# Patient Record
Sex: MALE | Race: BLACK OR AFRICAN AMERICAN | NOT HISPANIC OR LATINO | Employment: OTHER | URBAN - METROPOLITAN AREA
[De-identification: names, ages, dates, MRNs, and addresses within clinical notes are randomized per-mention and may not be internally consistent; named-entity substitution may affect disease eponyms.]

---

## 2019-08-23 ENCOUNTER — TELEPHONE (OUTPATIENT)
Dept: NEPHROLOGY | Facility: CLINIC | Age: 54
End: 2019-08-23

## 2019-08-23 DIAGNOSIS — N18.9 CHRONIC KIDNEY DISEASE, UNSPECIFIED CKD STAGE: Primary | ICD-10-CM

## 2019-08-23 NOTE — TELEPHONE ENCOUNTER
----- Message from Tricia Rice sent at 8/23/2019  2:48 PM CDT -----  Contact: Pt  Pt missed a call and would like the nurse to return their call.    Pt can be reached at [phone]

## 2019-08-27 ENCOUNTER — TELEPHONE (OUTPATIENT)
Dept: NEPHROLOGY | Facility: CLINIC | Age: 54
End: 2019-08-27

## 2019-09-05 ENCOUNTER — TELEPHONE (OUTPATIENT)
Dept: NEPHROLOGY | Facility: CLINIC | Age: 54
End: 2019-09-05

## 2019-09-05 NOTE — TELEPHONE ENCOUNTER
----- Message from Chanel Mattson sent at 9/5/2019  9:16 AM CDT -----  Contact: pt  Pt called about appts     Didn't know doctor appt had been rescheduled   Relies on transportation Service    9/9  Has ultrasound     Symbol says insurance denied.  Do you know why?      I told him to call his insurance company to see why denied.    Can you call pt  Should he keep appt if he cannot have ultrasound done?    Thanks

## 2019-09-16 ENCOUNTER — TELEPHONE (OUTPATIENT)
Dept: NEPHROLOGY | Facility: CLINIC | Age: 54
End: 2019-09-16

## 2019-09-16 NOTE — TELEPHONE ENCOUNTER
----- Message from Elzbieta Shaikh sent at 9/16/2019 12:17 PM CDT -----  Contact:   Abi Mendez  [phone]  [facility] /    [facility] called stating that the pt does not need a referral to see the DrVinicio.   Calling to provide an reference number [id number]..   [phone] option 2.. Call  Back to verify

## 2019-09-17 ENCOUNTER — TELEPHONE (OUTPATIENT)
Dept: NEPHROLOGY | Facility: CLINIC | Age: 54
End: 2019-09-17

## 2019-09-30 ENCOUNTER — OFFICE VISIT (OUTPATIENT)
Dept: NEPHROLOGY | Facility: CLINIC | Age: 54
End: 2019-09-30
Payer: MEDICARE

## 2019-09-30 VITALS
WEIGHT: 177 LBS | SYSTOLIC BLOOD PRESSURE: 118 MMHG | HEIGHT: 71 IN | BODY MASS INDEX: 24.78 KG/M2 | DIASTOLIC BLOOD PRESSURE: 76 MMHG

## 2019-09-30 DIAGNOSIS — N17.9 ACUTE RENAL FAILURE, UNSPECIFIED ACUTE RENAL FAILURE TYPE: Primary | ICD-10-CM

## 2019-09-30 PROCEDURE — 3008F PR BODY MASS INDEX (BMI) DOCUMENTED: ICD-10-PCS | Mod: CPTII,S$GLB,, | Performed by: INTERNAL MEDICINE

## 2019-09-30 PROCEDURE — 99999 PR PBB SHADOW E&M-EST. PATIENT-LVL III: CPT | Mod: PBBFAC,,, | Performed by: INTERNAL MEDICINE

## 2019-09-30 PROCEDURE — 99204 OFFICE O/P NEW MOD 45 MIN: CPT | Mod: S$GLB,,, | Performed by: INTERNAL MEDICINE

## 2019-09-30 PROCEDURE — 99204 PR OFFICE/OUTPT VISIT, NEW, LEVL IV, 45-59 MIN: ICD-10-PCS | Mod: S$GLB,,, | Performed by: INTERNAL MEDICINE

## 2019-09-30 PROCEDURE — 99999 PR PBB SHADOW E&M-EST. PATIENT-LVL III: ICD-10-PCS | Mod: PBBFAC,,, | Performed by: INTERNAL MEDICINE

## 2019-09-30 PROCEDURE — 3008F BODY MASS INDEX DOCD: CPT | Mod: CPTII,S$GLB,, | Performed by: INTERNAL MEDICINE

## 2019-09-30 RX ORDER — PENICILLIN V POTASSIUM 500 MG/1
TABLET, FILM COATED ORAL
Refills: 0 | COMMUNITY
Start: 2019-08-31

## 2019-09-30 RX ORDER — BLOOD-GLUCOSE METER
EACH MISCELLANEOUS
Refills: 10 | COMMUNITY
Start: 2019-09-24

## 2019-09-30 RX ORDER — METFORMIN HYDROCHLORIDE 500 MG/1
TABLET ORAL
COMMUNITY

## 2019-09-30 RX ORDER — DICLOFENAC SODIUM 10 MG/G
GEL TOPICAL
Refills: 0 | COMMUNITY
Start: 2019-09-24

## 2019-09-30 RX ORDER — DOLUTEGRAVIR SODIUM 50 MG/1
TABLET, FILM COATED ORAL
Refills: 5 | COMMUNITY
Start: 2019-09-24

## 2019-09-30 RX ORDER — GABAPENTIN 300 MG/1
300 CAPSULE ORAL
COMMUNITY
Start: 2019-08-22

## 2019-09-30 RX ORDER — FLUTICASONE PROPIONATE 50 MCG
SPRAY, SUSPENSION (ML) NASAL
Refills: 11 | COMMUNITY
Start: 2019-09-24

## 2019-09-30 RX ORDER — FLUTICASONE PROPIONATE 50 MCG
1 SPRAY, SUSPENSION (ML) NASAL
COMMUNITY
Start: 2019-08-22 | End: 2020-08-21

## 2019-09-30 RX ORDER — BUPRENORPHINE HYDROCHLORIDE AND NALOXONE HYDROCHLORIDE DIHYDRATE 8; 2 MG/1; MG/1
TABLET SUBLINGUAL
COMMUNITY

## 2019-09-30 RX ORDER — BUPROPION HYDROCHLORIDE 150 MG/1
TABLET, EXTENDED RELEASE ORAL
Refills: 0 | COMMUNITY
Start: 2019-09-24

## 2019-09-30 RX ORDER — METFORMIN HYDROCHLORIDE 500 MG/1
TABLET ORAL
Refills: 0 | COMMUNITY
Start: 2019-07-23

## 2019-09-30 RX ORDER — TRAMADOL HYDROCHLORIDE 50 MG/1
TABLET ORAL
COMMUNITY

## 2019-09-30 RX ORDER — IBUPROFEN 800 MG/1
TABLET ORAL
Refills: 6 | COMMUNITY
Start: 2019-09-24

## 2019-09-30 RX ORDER — SILDENAFIL CITRATE 100 MG/1
TABLET, FILM COATED ORAL
Refills: 2 | COMMUNITY
Start: 2019-09-12

## 2019-09-30 RX ORDER — SILDENAFIL 100 MG/1
100 TABLET, FILM COATED ORAL
COMMUNITY
Start: 2019-09-10 | End: 2019-10-10

## 2019-09-30 RX ORDER — GABAPENTIN 300 MG/1
CAPSULE ORAL
Refills: 5 | COMMUNITY
Start: 2019-09-24

## 2019-09-30 NOTE — PROGRESS NOTES
REASON FOR CONSULT: SURJIT    REFERRING PHYSICIAN: local PCP    HISTORY OF PRESENT ILLNESS: 54 y.o. male who is new to me with HIV well controlled, DMII since 2011 and HTN, patient was referred here for abnormal renal function that was checked in June 2019 with Cr of 1.9, following cr in July and August were 1.3 and 1.2 respectively, patient reported taking Ibuprofen 3-4 tabs a day in June for headache now he is not taking much, only prn of half tab, he denies urinary or cardiac symptoms, no contrast exposure. He follows up with local PCP and HIV docs at [facility].      ROS:  General: negative for chills, or fatigue  ENT: No epistaxis or headaches  Hematological and Lymphatic: No bleeding problems or blood clots.  Endocrine: No skin changes or temperature intolerance  Respiratory: No cough, shortness of breath, or wheezing  Cardiovascular: No chest pain or dyspnea   Gastrointestinal: No abdominal pain, change in bowel habits  Genito-Urinary: No dysuria, trouble voiding, or hematuria  Musculoskeletal: ROS: negative for - joint pain, joint stiffness, joint swelling, muscle pain or muscular weakness  Neurological: No focal weakness, no numbness  Dermatological: No rash or ulcers.    PAST MEDICAL HISTORY:  No past medical history on file.    PAST SURGICAL HISTORY:  No past surgical history on file.    FAMILY HISTORY:   No renal diseases.    SOCIAL HISTORY:    Smokes daily, down to less half pack.    MEDICATIONS:    Current Outpatient Medications:     blood sugar diagnostic Strp, 1 strip by Other route., Disp: , Rfl:     dolutegravir (TIVICAY) 50 mg Tab, Tivicay 50 mg tablet  Take 1 tablet every day by oral route., Disp: , Rfl:     emtricitabine-tenofovir alafen (DESCOVY) 200-25 mg Tab, Descovy 200 mg-25 mg tablet  Take 1 tablet every day by oral route., Disp: , Rfl:     fluticasone propionate (FLONASE) 50 mcg/actuation nasal spray, 1 spray by Nasal route., Disp: , Rfl:     gabapentin (NEURONTIN) 300 MG capsule, Take 300  mg by mouth., Disp: , Rfl:     sildenafil (VIAGRA) 100 MG tablet, Take 100 mg by mouth., Disp: , Rfl:     buprenorphine-naloxone 8-2 mg (SUBOXONE) 8-2 mg Subl, buprenorphine 8 mg-naloxone 2 mg sublingual tablet  Place 1 tablet every day by sublingual route., Disp: , Rfl:     buPROPion (WELLBUTRIN SR) 150 MG TBSR 12 hr tablet, , Disp: , Rfl: 0    diclofenac sodium (VOLTAREN) 1 % Gel, , Disp: , Rfl: 0    fluticasone propionate (FLONASE) 50 mcg/actuation nasal spray, , Disp: , Rfl: 11    gabapentin (NEURONTIN) 300 MG capsule, , Disp: , Rfl: 5    ibuprofen (ADVIL,MOTRIN) 800 MG tablet, , Disp: , Rfl: 6    metFORMIN (GLUCOPHAGE) 500 MG tablet, metformin 500 mg tablet  Take 1 tablet twice a day by oral route., Disp: , Rfl:     metFORMIN (GLUCOPHAGE) 500 MG tablet, TK 1 T PO  BID WITH MEALS, Disp: , Rfl: 0    ONETOUCH VERIO Strp, , Disp: , Rfl: 10    penicillin v potassium (VEETID) 500 MG tablet, TK 1 T PO  BID TAT, Disp: , Rfl: 0    TIVICAY 50 mg Tab, , Disp: , Rfl: 5    traMADol (ULTRAM) 50 mg tablet, tramadol 50 mg tablet  Take 1 tablet every 6 hours by oral route., Disp: , Rfl:     VIAGRA 100 mg tablet, TK 1 T PO  PRF ED, Disp: , Rfl: 2   Medication List with Changes/Refills   Current Medications    BLOOD SUGAR DIAGNOSTIC STRP    1 strip by Other route.    BUPRENORPHINE-NALOXONE 8-2 MG (SUBOXONE) 8-2 MG SUBL    buprenorphine 8 mg-naloxone 2 mg sublingual tablet   Place 1 tablet every day by sublingual route.    BUPROPION (WELLBUTRIN SR) 150 MG TBSR 12 HR TABLET        DICLOFENAC SODIUM (VOLTAREN) 1 % GEL        DOLUTEGRAVIR (TIVICAY) 50 MG TAB    Tivicay 50 mg tablet   Take 1 tablet every day by oral route.    EMTRICITABINE-TENOFOVIR ALAFEN (DESCOVY) 200-25 MG TAB    Descovy 200 mg-25 mg tablet   Take 1 tablet every day by oral route.    FLUTICASONE PROPIONATE (FLONASE) 50 MCG/ACTUATION NASAL SPRAY    1 spray by Nasal route.    FLUTICASONE PROPIONATE (FLONASE) 50 MCG/ACTUATION NASAL SPRAY         "GABAPENTIN (NEURONTIN) 300 MG CAPSULE    Take 300 mg by mouth.    GABAPENTIN (NEURONTIN) 300 MG CAPSULE        IBUPROFEN (ADVIL,MOTRIN) 800 MG TABLET        METFORMIN (GLUCOPHAGE) 500 MG TABLET    metformin 500 mg tablet   Take 1 tablet twice a day by oral route.    METFORMIN (GLUCOPHAGE) 500 MG TABLET    TK 1 T PO  BID WITH MEALS    ONETOUCH VERIO STRP        PENICILLIN V POTASSIUM (VEETID) 500 MG TABLET    TK 1 T PO  BID TAT    SILDENAFIL (VIAGRA) 100 MG TABLET    Take 100 mg by mouth.    TIVICAY 50 MG TAB        TRAMADOL (ULTRAM) 50 MG TABLET    tramadol 50 mg tablet   Take 1 tablet every 6 hours by oral route.    VIAGRA 100 MG TABLET    TK 1 T PO  PRF ED        PHYSICAL EXAM:  /76   Ht 5' 11" (1.803 m)   Wt 80.3 kg (177 lb 0.5 oz)   BMI 24.69 kg/m²     General: No distress, No fever or chills  Head: Normocephalic,atraumatic  Eyes: conjunctivae/corneas clear. PERRL, EOM's intact.  Nose: Nares normal. Mucosa normal. No drainage or sinus tenderness.  Neck: No adenopathy,no carotid bruit,no JVD  Lungs:Clear to auscultation bilaterally, No Crackles  Heart: Regular rate and rhythm, no murmur, gallops or rubs  Abdomen: Soft, no tenderness, bowel sounds normal  Extremities: Atraumatic, no edema in LE  Skin: Turgor normal. No rashes or ulcers  Neurologic: No focal weakness, oriented.  Dialysis Access: Non applicable         LABS:  Cr latest in 8/2019 1.2 with eGFR >60      ASSESSMENT:    SURJIT in June 2019 due to extensive NSAIDs intake, highest cr ~ 1.9  -Cr baseline ~ 1.1-1.2 which is back to his normal in July and August this year  -UPCR na but none on UA      HTN  -well controlled, off/on on HCTZ    DMII  -well controlled on Metformin    HIV  -undetectable on Decovey            PLAN:  -advise PCP to follow on urine studies, if Microalbuminuria occures might switch to ACE-I or ARB, keep good control of DMII, HTN and HIV  -Continue current BP meds regimen  -Avoid NSAIDs intake      RTC as needed      Thanks for " allowing me to participate in the care of this patient.     8:41 AM    BANDAR JUSTICE MD  NEPHROLOGY ATTENDING

## 2019-09-30 NOTE — LETTER
September 30, 2019      No Recipients           [facility]  [address]  [facility] [address]  Phone: [phone]  Fax: [phone]          Patient: Ang Loya   MR Number: [id number]   YOB: 1965   Date of Visit: 9/30/2019       Dear :    Thank you for referring Ang Loya to me for evaluation. Attached you will find relevant portions of my assessment and plan of care.    If you have questions, please do not hesitate to call me. I look forward to following Ang Loya along with you.    Sincerely,    Jailyn Clayton MD    Enclosure  CC:  No Recipients    If you would like to receive this communication electronically, please contact [email or web address] or [phone] to request more information on [employer] Link access.    For providers and/or their staff who would like to refer a patient to [facility], please contact us through our one-stop-shop provider referral line, [facility] [place], at [phone].    If you feel you have received this communication in error or would no longer like to receive these types of communications, please e-mail [email or web address]

## 2019-11-16 ENCOUNTER — HOSPITAL ENCOUNTER (EMERGENCY)
Facility: HOSPITAL | Age: 54
Discharge: HOME | End: 2019-11-16
Attending: EMERGENCY MEDICINE
Payer: MEDICARE

## 2019-11-16 VITALS
HEART RATE: 53 BPM | RESPIRATION RATE: 18 BRPM | HEIGHT: 71 IN | OXYGEN SATURATION: 100 % | TEMPERATURE: 98 F | DIASTOLIC BLOOD PRESSURE: 90 MMHG | BODY MASS INDEX: 23.8 KG/M2 | SYSTOLIC BLOOD PRESSURE: 138 MMHG | WEIGHT: 170 LBS

## 2019-11-16 DIAGNOSIS — K92.0 HEMATEMESIS WITH NAUSEA: ICD-10-CM

## 2019-11-16 DIAGNOSIS — R10.13 ABDOMINAL PAIN, ACUTE, EPIGASTRIC: Primary | ICD-10-CM

## 2019-11-16 LAB
ABO + RH BLD: NORMAL
ALBUMIN SERPL BCP-MCNC: 4.1 G/DL (ref 3.5–5.2)
ALP SERPL-CCNC: 88 U/L (ref 55–135)
ALT SERPL W/O P-5'-P-CCNC: 57 U/L (ref 10–44)
ANION GAP SERPL CALC-SCNC: 9 MMOL/L (ref 8–16)
APTT BLDCRRT: 27.1 SEC (ref 21–32)
AST SERPL-CCNC: 46 U/L (ref 10–40)
BASOPHILS # BLD AUTO: 0.05 K/UL (ref 0–0.2)
BASOPHILS NFR BLD: 0.7 % (ref 0–1.9)
BILIRUB SERPL-MCNC: 0.7 MG/DL (ref 0.1–1)
BLD GP AB SCN CELLS X3 SERPL QL: NORMAL
BUN SERPL-MCNC: 20 MG/DL (ref 6–20)
CALCIUM SERPL-MCNC: 10 MG/DL (ref 8.7–10.5)
CHLORIDE SERPL-SCNC: 103 MMOL/L (ref 95–110)
CO2 SERPL-SCNC: 26 MMOL/L (ref 23–29)
CREAT SERPL-MCNC: 1.1 MG/DL (ref 0.5–1.4)
DIFFERENTIAL METHOD: ABNORMAL
EOSINOPHIL # BLD AUTO: 0.1 K/UL (ref 0–0.5)
EOSINOPHIL NFR BLD: 1.9 % (ref 0–8)
ERYTHROCYTE [DISTWIDTH] IN BLOOD BY AUTOMATED COUNT: 12.3 % (ref 11.5–14.5)
EST. GFR  (AFRICAN AMERICAN): >60 ML/MIN/1.73 M^2
EST. GFR  (NON AFRICAN AMERICAN): >60 ML/MIN/1.73 M^2
GLUCOSE SERPL-MCNC: 79 MG/DL (ref 70–110)
HCT VFR BLD AUTO: 43.6 % (ref 40–54)
HGB BLD-MCNC: 14.7 G/DL (ref 14–18)
IMM GRANULOCYTES # BLD AUTO: 0.01 K/UL (ref 0–0.04)
IMM GRANULOCYTES NFR BLD AUTO: 0.1 % (ref 0–0.5)
INR PPP: 1.1 (ref 0.8–1.2)
LIPASE SERPL-CCNC: 68 U/L (ref 4–60)
LYMPHOCYTES # BLD AUTO: 1.3 K/UL (ref 1–4.8)
LYMPHOCYTES NFR BLD: 18.2 % (ref 18–48)
MCH RBC QN AUTO: 31.2 PG (ref 27–31)
MCHC RBC AUTO-ENTMCNC: 33.7 G/DL (ref 32–36)
MCV RBC AUTO: 93 FL (ref 82–98)
MONOCYTES # BLD AUTO: 0.8 K/UL (ref 0.3–1)
MONOCYTES NFR BLD: 11.8 % (ref 4–15)
NEUTROPHILS # BLD AUTO: 4.7 K/UL (ref 1.8–7.7)
NEUTROPHILS NFR BLD: 67.3 % (ref 38–73)
NRBC BLD-RTO: 0 /100 WBC
PLATELET # BLD AUTO: 213 K/UL (ref 150–350)
PMV BLD AUTO: 9.6 FL (ref 9.2–12.9)
POTASSIUM SERPL-SCNC: 4.1 MMOL/L (ref 3.5–5.1)
PROT SERPL-MCNC: 8.2 G/DL (ref 6–8.4)
PROTHROMBIN TIME: 11.3 SEC (ref 9–12.5)
RBC # BLD AUTO: 4.71 M/UL (ref 4.6–6.2)
SODIUM SERPL-SCNC: 138 MMOL/L (ref 136–145)
WBC # BLD AUTO: 7.02 K/UL (ref 3.9–12.7)

## 2019-11-16 PROCEDURE — 96361 HYDRATE IV INFUSION ADD-ON: CPT

## 2019-11-16 PROCEDURE — 85610 PROTHROMBIN TIME: CPT

## 2019-11-16 PROCEDURE — 86850 RBC ANTIBODY SCREEN: CPT

## 2019-11-16 PROCEDURE — 83690 ASSAY OF LIPASE: CPT

## 2019-11-16 PROCEDURE — C9113 INJ PANTOPRAZOLE SODIUM, VIA: HCPCS | Performed by: EMERGENCY MEDICINE

## 2019-11-16 PROCEDURE — 85730 THROMBOPLASTIN TIME PARTIAL: CPT

## 2019-11-16 PROCEDURE — 80053 COMPREHEN METABOLIC PANEL: CPT

## 2019-11-16 PROCEDURE — 85025 COMPLETE CBC W/AUTO DIFF WBC: CPT

## 2019-11-16 PROCEDURE — 99284 EMERGENCY DEPT VISIT MOD MDM: CPT | Mod: 25

## 2019-11-16 PROCEDURE — 96374 THER/PROPH/DIAG INJ IV PUSH: CPT

## 2019-11-16 PROCEDURE — 63600175 PHARM REV CODE 636 W HCPCS: Performed by: EMERGENCY MEDICINE

## 2019-11-16 RX ORDER — PANTOPRAZOLE SODIUM 40 MG/10ML
80 INJECTION, POWDER, LYOPHILIZED, FOR SOLUTION INTRAVENOUS
Status: COMPLETED | OUTPATIENT
Start: 2019-11-16 | End: 2019-11-16

## 2019-11-16 RX ORDER — PANTOPRAZOLE SODIUM 40 MG/1
TABLET, DELAYED RELEASE ORAL
Qty: 30 TABLET | Refills: 2 | Status: SHIPPED | OUTPATIENT
Start: 2019-11-16

## 2019-11-16 RX ORDER — ONDANSETRON 4 MG/1
4 TABLET, FILM COATED ORAL EVERY 8 HOURS PRN
Qty: 12 TABLET | Refills: 0 | Status: SHIPPED | OUTPATIENT
Start: 2019-11-16

## 2019-11-16 RX ADMIN — SODIUM CHLORIDE 1000 ML: 0.9 INJECTION, SOLUTION INTRAVENOUS at 04:11

## 2019-11-16 RX ADMIN — PANTOPRAZOLE SODIUM 80 MG: 40 INJECTION, POWDER, FOR SOLUTION INTRAVENOUS at 04:11

## 2019-11-16 NOTE — ED NOTES
Patient expressed to me that he doesn't like his living situation and he does not like people and the people he lives with, he is currently living in a group home. Attempted to contact the  and left a voicemail. Will continue to try.   Patient also expressed he was feeling homicidal, MD aware. MD stated he will go talk to the patient.

## 2019-11-16 NOTE — ED TRIAGE NOTES
54 y.o. Presents to the ED with chief complaint of abdominal pain and hematemesis. Pt states he threw up bright red blood twice today and abdominal pain in the epigastric pain. Pt states he also had diarrhea today. Pt denies black stools. Pt rates pain 10/10. AAOx4, NAD. Pt states he is HIV+ and he stopped taking all his medications bedside his HIV medications.

## 2019-11-16 NOTE — ED NOTES
Patient sleeping comfortably with light off, call light within reach, side rails up x 2, bed in lowest position, will continue to monitor.

## 2019-11-16 NOTE — ED PROVIDER NOTES
Encounter Date: 11/16/2019    SCRIBE #1 NOTE: I, Chanel Tidwell, am scribing for, and in the presence of,  Stewart Martínez MD. I have scribed the following portions of the note - Other sections scribed: HPI, ROS.       History     Chief Complaint   Patient presents with    Abdominal Pain     Pt reports abd pain that is chronic, but worsening over 2 months with hematemesis begining today    Hematemesis     CC: Hematemesis    HPI:   This is a 54 y.o. male with a PMHx of HTN, DM, Peripheral Neuropathy, and Arthritis who presents to the Emergency Department complaining of hematemesis (x2) that began this morning. Pt reports associated symptoms of epigastric abdominal pain that began this morning, nausea that has been going on for years, right frontal headache, diarrhea (x1 PTA), back pain, and a chronic rash on his back. He denies fever, chills, or dysuria. Abdominal pain worsens with palpation. Pt takes Percocet to treat his back pain. Pt is compliant with HTN and DM medications. Pt smokes. Pt drinks occasionally. Pt has no PSHx. NKDA.       The history is provided by the patient.     Review of patient's allergies indicates:  No Known Allergies  Past Medical History:   Diagnosis Date    Diabetes mellitus     HIV (human immunodeficiency virus infection) 1998    Hypertension      History reviewed. No pertinent surgical history.  History reviewed. No pertinent family history.  Social History     Tobacco Use    Smoking status: Current Some Day Smoker    Smokeless tobacco: Never Used   Substance Use Topics    Alcohol use: Yes     Comment: OCC    Drug use: Not Currently     Review of Systems   Constitutional: Negative for chills, diaphoresis and fever.   HENT: Negative for ear pain and sore throat.    Eyes: Negative for visual disturbance.   Respiratory: Negative for cough and shortness of breath.    Cardiovascular: Negative for chest pain.   Gastrointestinal: Positive for abdominal pain (epigastric), diarrhea, nausea and  vomiting (hematemesis).   Genitourinary: Negative for dysuria.   Musculoskeletal: Positive for back pain. Negative for arthralgias and myalgias.   Skin: Positive for rash (chronic rash on back).   Neurological: Positive for headaches (right frontal).       Physical Exam     Initial Vitals [11/16/19 1524]   BP Pulse Resp Temp SpO2   (!) 159/71 67 18 98 °F (36.7 °C) 95 %      MAP       --         Physical Exam  The patient was examined specifically for the following:   General:No significant distress, Good color, Warm and dry. Head and neck:Scalp atraumatic, Neck supple. Neurological:Appropriate conversation, Gross motor deficits. Eyes:Conjugate gaze, Clear corneas. ENT: No epistaxis. Cardiac: Regular rate and rhythm, Grossly normal heart tones. Pulmonary: Wheezing, Rales. Gastrointestinal: Abdominal tenderness, Abdominal distention. Musculoskeletal: Extremity deformity, Apparent pain with range of motion of the joints. Skin: Rash.   The findings on examination were normal except for the following:  The patient has guaiac-negative stool.  He has mild epigastric abdominal tenderness.  ED Course   Procedures   This patient would not tolerate NG tube placement.  I attempted to place an orogastric tube.  The patient did not tolerate that well either.  I am not sure that the tip of the tube was actually placed into the stomach.  We tested the pH of the aspirate.  It was about 5.  Limited irrigation with tap water did not reveal any blood.   Labs Reviewed   COMPREHENSIVE METABOLIC PANEL - Abnormal; Notable for the following components:       Result Value    AST 46 (*)     ALT 57 (*)     All other components within normal limits   CBC W/ AUTO DIFFERENTIAL - Abnormal; Notable for the following components:    Mean Corpuscular Hemoglobin 31.2 (*)     All other components within normal limits   LIPASE - Abnormal; Notable for the following components:    Lipase 68 (*)     All other components within normal limits   APTT    PROTIME-INR   TYPE & SCREEN          Imaging Results    None       Medical decision making:  Given the above this patient presents with a history 2 episodes of hematemesis.  He also has some epigastric abdominal discomfort.  His stools guaiac-negative.  A questionable nasogastric intubation was performed.  No blood was obtained. The patient has a normal hemoglobin and hematocrit.  I doubt significant upper GI bleeding.  I will discharge this patient outpatient evaluation and treatment.  I will treat the patient with pantoprazole and Zofran.  I will have him follow up with Gastroenterology in the office.  I discussed this case with , who recommends discharge to office evaluation and treatment.  I am concerned this patient may have an early peptic ulcer.                Scribe Attestation:   Scribe #1: I performed the above scribed service and the documentation accurately describes the services I performed. I attest to the accuracy of the note.                          Clinical Impression:       ICD-10-CM ICD-9-CM   1. Abdominal pain, acute, epigastric R10.13 789.06     338.19   2. Hematemesis with nausea K92.0 578.0     787.02              I personally performed the services described in this documentation.  All medical record  entries made by the scribe are at my direction and in my presence.  Signed, Dr. Tanya Martínez MD  11/16/19 7595

## 2019-11-16 NOTE — DISCHARGE INSTRUCTIONS
Please follow-up with Gastroenterology this week.  Call Monday morning for an appointment.  Return immediately if you get worse or if new problems develop, especially more vomiting blood, or black or bloody stools.  Pantoprazole and Zofran as directed.  Please use Mylanta 30 mL by mouth every 4 hr while you have abdominal discomfort.  Please avoid caffeine carbonation alcohol cigarette citrus tomato Naprosyn aspirin ibuprofen.

## 2019-11-16 NOTE — ED NOTES
I attempted twice to place NG without success, patient would not allow me to finish the procedure. OG placed by MD to lavage for blood, pH tested done with result of 4, MD lavaged but there was no blood present and OG tube removed.

## 2019-11-17 NOTE — PROVIDER PROGRESS NOTES - EMERGENCY DEPT.
"Encounter Date: 11/16/2019    ED Physician Progress Notes        Physician Note:   Notified by nursing that patient had made some potential suicidal and or homicidal remarks when specifically asked during nursing assessment. I discussed this with the patient. States he is currently at a group home that he is not happy with. That he has a  whom he know how to contact on Monday. Stated to me that he is not suicidal or homicidal at this time. Feels he will be fine emotionally until contacting his  on Monday. I see that Dr. Martínez put in a social work consult at 5:27 PM today. The nurse has made two phone calls to the  phone number without answer. Per [facility] there is no on call  at this time. Patient already discharged form a medical stand point by Dr. Martínez. I asked Mr. Loya if there was anything else he feels I can do for him in the ER [facility] and responded "no."  Advised nurse she can proceed with the previous discharge.      "

## 2020-01-23 ENCOUNTER — HOSPITAL ENCOUNTER (EMERGENCY)
Facility: HOSPITAL | Age: 55
Discharge: HOME | End: 2020-01-23
Attending: EMERGENCY MEDICINE
Payer: MEDICARE

## 2020-01-23 VITALS
HEART RATE: 83 BPM | SYSTOLIC BLOOD PRESSURE: 152 MMHG | RESPIRATION RATE: 19 BRPM | TEMPERATURE: 99 F | OXYGEN SATURATION: 97 % | DIASTOLIC BLOOD PRESSURE: 80 MMHG

## 2020-01-23 DIAGNOSIS — R06.02 SOB (SHORTNESS OF BREATH): ICD-10-CM

## 2020-01-23 DIAGNOSIS — J10.1 INFLUENZA A: Primary | ICD-10-CM

## 2020-01-23 LAB
ALBUMIN SERPL BCP-MCNC: 3.8 G/DL (ref 3.5–5.2)
ALP SERPL-CCNC: 66 U/L (ref 55–135)
ALT SERPL W/O P-5'-P-CCNC: 17 U/L (ref 10–44)
ANION GAP SERPL CALC-SCNC: 12 MMOL/L (ref 8–16)
AST SERPL-CCNC: 34 U/L (ref 10–40)
BACTERIA #/AREA URNS AUTO: NORMAL /HPF
BASOPHILS # BLD AUTO: 0.02 K/UL (ref 0–0.2)
BASOPHILS NFR BLD: 0.2 % (ref 0–1.9)
BILIRUB SERPL-MCNC: 0.6 MG/DL (ref 0.1–1)
BILIRUB UR QL STRIP: NEGATIVE
BUN SERPL-MCNC: 26 MG/DL (ref 6–20)
CALCIUM SERPL-MCNC: 9.8 MG/DL (ref 8.7–10.5)
CHLORIDE SERPL-SCNC: 101 MMOL/L (ref 95–110)
CLARITY UR REFRACT.AUTO: CLEAR
CO2 SERPL-SCNC: 23 MMOL/L (ref 23–29)
COLOR UR AUTO: YELLOW
CREAT SERPL-MCNC: 1.2 MG/DL (ref 0.5–1.4)
DIFFERENTIAL METHOD: ABNORMAL
EOSINOPHIL # BLD AUTO: 0 K/UL (ref 0–0.5)
EOSINOPHIL NFR BLD: 0 % (ref 0–8)
ERYTHROCYTE [DISTWIDTH] IN BLOOD BY AUTOMATED COUNT: 13.2 % (ref 11.5–14.5)
EST. GFR  (AFRICAN AMERICAN): >60 ML/MIN/1.73 M^2
EST. GFR  (NON AFRICAN AMERICAN): >60 ML/MIN/1.73 M^2
GLUCOSE SERPL-MCNC: 104 MG/DL (ref 70–110)
GLUCOSE UR QL STRIP: NEGATIVE
HCT VFR BLD AUTO: 40.7 % (ref 40–54)
HGB BLD-MCNC: 13.4 G/DL (ref 14–18)
HGB UR QL STRIP: ABNORMAL
HYALINE CASTS UR QL AUTO: 0 /LPF
IMM GRANULOCYTES # BLD AUTO: 0.03 K/UL (ref 0–0.04)
IMM GRANULOCYTES NFR BLD AUTO: 0.4 % (ref 0–0.5)
INFLUENZA A, MOLECULAR: POSITIVE
INFLUENZA B, MOLECULAR: NEGATIVE
KETONES UR QL STRIP: ABNORMAL
LACTATE SERPL-SCNC: 1.8 MMOL/L (ref 0.5–2.2)
LEUKOCYTE ESTERASE UR QL STRIP: NEGATIVE
LYMPHOCYTES # BLD AUTO: 0.5 K/UL (ref 1–4.8)
LYMPHOCYTES NFR BLD: 5.9 % (ref 18–48)
MCH RBC QN AUTO: 30.7 PG (ref 27–31)
MCHC RBC AUTO-ENTMCNC: 32.9 G/DL (ref 32–36)
MCV RBC AUTO: 93 FL (ref 82–98)
MICROSCOPIC COMMENT: NORMAL
MONOCYTES # BLD AUTO: 0.8 K/UL (ref 0.3–1)
MONOCYTES NFR BLD: 9.1 % (ref 4–15)
NEUTROPHILS # BLD AUTO: 6.9 K/UL (ref 1.8–7.7)
NEUTROPHILS NFR BLD: 84.4 % (ref 38–73)
NITRITE UR QL STRIP: NEGATIVE
NRBC BLD-RTO: 0 /100 WBC
PH UR STRIP: 5 [PH] (ref 5–8)
PLATELET # BLD AUTO: 196 K/UL (ref 150–350)
PMV BLD AUTO: 9.6 FL (ref 9.2–12.9)
POTASSIUM SERPL-SCNC: 3.6 MMOL/L (ref 3.5–5.1)
PROT SERPL-MCNC: 7.9 G/DL (ref 6–8.4)
PROT UR QL STRIP: ABNORMAL
RBC # BLD AUTO: 4.36 M/UL (ref 4.6–6.2)
RBC #/AREA URNS AUTO: 4 /HPF (ref 0–4)
SODIUM SERPL-SCNC: 136 MMOL/L (ref 136–145)
SP GR UR STRIP: 1.02 (ref 1–1.03)
SPECIMEN SOURCE: ABNORMAL
URN SPEC COLLECT METH UR: ABNORMAL
WBC # BLD AUTO: 8.2 K/UL (ref 3.9–12.7)
WBC #/AREA URNS AUTO: 0 /HPF (ref 0–5)

## 2020-01-23 PROCEDURE — 83605 ASSAY OF LACTIC ACID: CPT

## 2020-01-23 PROCEDURE — 25000003 PHARM REV CODE 250: Performed by: PHYSICIAN ASSISTANT

## 2020-01-23 PROCEDURE — 87502 INFLUENZA DNA AMP PROBE: CPT

## 2020-01-23 PROCEDURE — 99285 PR EMERGENCY DEPT VISIT,LEVEL V: ICD-10-PCS | Mod: ,,, | Performed by: PHYSICIAN ASSISTANT

## 2020-01-23 PROCEDURE — 96360 HYDRATION IV INFUSION INIT: CPT

## 2020-01-23 PROCEDURE — 63600175 PHARM REV CODE 636 W HCPCS: Performed by: PHYSICIAN ASSISTANT

## 2020-01-23 PROCEDURE — 85025 COMPLETE CBC W/AUTO DIFF WBC: CPT

## 2020-01-23 PROCEDURE — 99284 EMERGENCY DEPT VISIT MOD MDM: CPT | Mod: 25

## 2020-01-23 PROCEDURE — 99285 EMERGENCY DEPT VISIT HI MDM: CPT | Mod: ,,, | Performed by: PHYSICIAN ASSISTANT

## 2020-01-23 PROCEDURE — 80053 COMPREHEN METABOLIC PANEL: CPT

## 2020-01-23 PROCEDURE — 81001 URINALYSIS AUTO W/SCOPE: CPT

## 2020-01-23 RX ORDER — ACETAMINOPHEN 500 MG
1000 TABLET ORAL
Status: COMPLETED | OUTPATIENT
Start: 2020-01-23 | End: 2020-01-23

## 2020-01-23 RX ADMIN — ACETAMINOPHEN 1000 MG: 500 TABLET ORAL at 07:01

## 2020-01-23 RX ADMIN — SODIUM CHLORIDE, SODIUM LACTATE, POTASSIUM CHLORIDE, AND CALCIUM CHLORIDE 1000 ML: .6; .31; .03; .02 INJECTION, SOLUTION INTRAVENOUS at 07:01

## 2020-01-24 NOTE — DISCHARGE INSTRUCTIONS
Use Tylenol and/or Motrin to control body aches and fever  Drink plenty of fluids  Return to the ER as needed

## 2020-01-24 NOTE — ED PROVIDER NOTES
Encounter Date: 1/23/2020       History     Chief Complaint   Patient presents with    Fever     Pt arrives c/o 102.4 fever home. Hx of HIV.     54-year-old male with HIV presents to ER with a chief complaint of fever and myalgias for 2 days.  He has not taken any medication for his symptoms.  Upon arrival to the ER he has a low-grade fever, mildly tachypneic, normotensive and not tachycardic.  He complains of a cough, productive for few days. He denies any pain other than chronic pain. He is compliant with all of his antiviral medications and reports recent labs show undetectable viral load.  He is on prophylactic amoxicillin.  He appears ill but not septic.        Review of patient's allergies indicates:  No Known Allergies  Past Medical History:   Diagnosis Date    Diabetes mellitus     HIV (human immunodeficiency virus infection) 1998    Hypertension      No past surgical history on file.  No family history on file.  Social History     Tobacco Use    Smoking status: Current Some Day Smoker    Smokeless tobacco: Never Used   Substance Use Topics    Alcohol use: Yes     Comment: OCC    Drug use: Not Currently     Review of Systems   Constitutional: Positive for chills, fatigue and fever.   HENT: Negative for sore throat.    Respiratory: Positive for cough. Negative for shortness of breath.    Cardiovascular: Negative for chest pain.   Gastrointestinal: Negative for nausea.   Genitourinary: Negative for dysuria.   Musculoskeletal: Negative for back pain.   Skin: Negative for rash.   Neurological: Negative for weakness.   Hematological: Does not bruise/bleed easily.       Physical Exam     Initial Vitals [01/23/20 1900]   BP Pulse Resp Temp SpO2   (!) 145/83 75 (!) 22 (!) 102.5 °F (39.2 °C) 97 %      MAP       --         Physical Exam    Constitutional: Vital signs are normal. He appears well-developed and well-nourished. He is not diaphoretic. No distress.   HENT:   Head: Normocephalic and atraumatic.    Right Ear: Hearing and external ear normal.   Left Ear: Hearing and external ear normal.   Nose: Nose normal.   Mouth/Throat: Oropharyngeal exudate present.   Eyes: Conjunctivae are normal.   Cardiovascular: Normal rate and regular rhythm. Exam reveals no gallop and no friction rub.    No murmur heard.  Pulmonary/Chest: No respiratory distress. He has no wheezes. He has no rhonchi. He has no rales. He exhibits no tenderness.   Clear with good air movement   Abdominal: Soft. Normal appearance and bowel sounds are normal. He exhibits no distension. There is no tenderness.   Soft and nontender   Musculoskeletal: Normal range of motion.   Neurological: He is alert and oriented to person, place, and time.   Skin: Skin is warm and intact.   Psychiatric: He has a normal mood and affect. His speech is normal and behavior is normal. Cognition and memory are normal.         ED Course   Procedures  Labs Reviewed   INFLUENZA A & B BY MOLECULAR   CBC W/ AUTO DIFFERENTIAL   COMPREHENSIVE METABOLIC PANEL   URINALYSIS, REFLEX TO URINE CULTURE   LACTIC ACID, PLASMA          Imaging Results    None       X-Rays:   Independently Interpreted Readings:   Other Readings:  No acute cardiopulmonary process    Medical Decision Making:   Initial Assessment:   54-year-old male presenting with fever  Differential Diagnosis:   UTI, pneumonia, sinusitis, influenza  Independently Interpreted Test(s):   I have ordered and independently interpreted X-rays - see prior notes.  Clinical Tests:   Lab Tests: Ordered and Reviewed  Radiological Study: Ordered and Reviewed  Medical Tests: Ordered and Reviewed  ED Management:  No acute findings chest x-ray  Labs remarkable for influenza  Patient's symptoms improved after treatment in the ED with fluids and Tylenol  Patient still feels ill but better  He is tolerating p.o.  Discuss the options of treatment with the patient including Tamiflu  The decision was made to treat supportively with fluids rest  Tylenol and ibuprofen  Strict return and follow-up precautions given.  Patient is stable for discharge.  Other:   I discussed test(s) with the performing physician.                                 Clinical Impression:       ICD-10-CM ICD-9-CM   1. Influenza A J10.1 487.1   2. SOB (shortness of breath) R06.02 786.05         Disposition:   Disposition: Discharged  Condition: Stable                     Tab Dickerson PA-C  01/23/20 2017

## 2020-01-24 NOTE — ED TRIAGE NOTES
Pt. Presents to ED today with ems from home and c/o generalized body aches and sob over the last day. +productive cough with white phlegm, tachypneic at 40 breaths/min, sating 99% RA. Hx of HIV, febrile at 102.1 per ems. Denies cp, n/v/d. Currently a&ox4, gcs 15, calm, cooperative.

## 2020-01-24 NOTE — EKG INTERPRETATIONS - EMERGENCY DEPT.
Independently interpreted by MD:  Rate 102, NSR, no stemi, no ectopy, no hypertrophy, tachycardia, nonspecific ST and T wave changes

## 2020-01-24 NOTE — ED NOTES
Denies questions or concerns regarding discharge instructions or fu. No acute distress noted per this RN. Taken to lobby with family in wheelchair.

## 2022-03-23 ENCOUNTER — HOSPITAL ENCOUNTER (EMERGENCY)
Facility: OTHER | Age: 57
Discharge: HOME | End: 2022-03-23
Attending: EMERGENCY MEDICINE
Payer: MEDICARE

## 2022-03-23 VITALS
HEIGHT: 71 IN | OXYGEN SATURATION: 94 % | TEMPERATURE: 98 F | HEART RATE: 74 BPM | BODY MASS INDEX: 26.6 KG/M2 | WEIGHT: 190 LBS | SYSTOLIC BLOOD PRESSURE: 119 MMHG | RESPIRATION RATE: 16 BRPM | DIASTOLIC BLOOD PRESSURE: 73 MMHG

## 2022-03-23 DIAGNOSIS — Z51.89 VISIT FOR WOUND CHECK: Primary | ICD-10-CM

## 2022-03-23 DIAGNOSIS — S99.921A INJURY OF RIGHT GREAT TOE: ICD-10-CM

## 2022-03-23 LAB
ALBUMIN SERPL BCP-MCNC: 3.5 G/DL (ref 3.5–5.2)
ALP SERPL-CCNC: 60 U/L (ref 55–135)
ALT SERPL W/O P-5'-P-CCNC: 26 U/L (ref 10–44)
ANION GAP SERPL CALC-SCNC: 11 MMOL/L (ref 8–16)
AST SERPL-CCNC: 19 U/L (ref 10–40)
BASOPHILS # BLD AUTO: 0.03 K/UL (ref 0–0.2)
BASOPHILS NFR BLD: 0.9 % (ref 0–1.9)
BILIRUB SERPL-MCNC: 0.4 MG/DL (ref 0.1–1)
BUN SERPL-MCNC: 27 MG/DL (ref 6–20)
CALCIUM SERPL-MCNC: 9.5 MG/DL (ref 8.7–10.5)
CHLORIDE SERPL-SCNC: 105 MMOL/L (ref 95–110)
CO2 SERPL-SCNC: 23 MMOL/L (ref 23–29)
CREAT SERPL-MCNC: 1.2 MG/DL (ref 0.5–1.4)
CRP SERPL-MCNC: 2.3 MG/L (ref 0–8.2)
DIFFERENTIAL METHOD: ABNORMAL
EOSINOPHIL # BLD AUTO: 0.2 K/UL (ref 0–0.5)
EOSINOPHIL NFR BLD: 4.6 % (ref 0–8)
ERYTHROCYTE [DISTWIDTH] IN BLOOD BY AUTOMATED COUNT: 13.1 % (ref 11.5–14.5)
ERYTHROCYTE [SEDIMENTATION RATE] IN BLOOD: 32 MM/HR (ref 0–10)
EST. GFR  (AFRICAN AMERICAN): >60 ML/MIN/1.73 M^2
EST. GFR  (NON AFRICAN AMERICAN): >60 ML/MIN/1.73 M^2
GLUCOSE SERPL-MCNC: 105 MG/DL (ref 70–110)
HCT VFR BLD AUTO: 38.2 % (ref 40–54)
HGB BLD-MCNC: 12.8 G/DL (ref 14–18)
IMM GRANULOCYTES # BLD AUTO: 0 K/UL (ref 0–0.04)
IMM GRANULOCYTES NFR BLD AUTO: 0 % (ref 0–0.5)
LYMPHOCYTES # BLD AUTO: 1.7 K/UL (ref 1–4.8)
LYMPHOCYTES NFR BLD: 49 % (ref 18–48)
MCH RBC QN AUTO: 31.7 PG (ref 27–31)
MCHC RBC AUTO-ENTMCNC: 33.5 G/DL (ref 32–36)
MCV RBC AUTO: 95 FL (ref 82–98)
MONOCYTES # BLD AUTO: 0.4 K/UL (ref 0.3–1)
MONOCYTES NFR BLD: 10.4 % (ref 4–15)
NEUTROPHILS # BLD AUTO: 1.2 K/UL (ref 1.8–7.7)
NEUTROPHILS NFR BLD: 35.1 % (ref 38–73)
NRBC BLD-RTO: 0 /100 WBC
PLATELET # BLD AUTO: 197 K/UL (ref 150–450)
PMV BLD AUTO: 9.7 FL (ref 9.2–12.9)
POTASSIUM SERPL-SCNC: 4.3 MMOL/L (ref 3.5–5.1)
PROT SERPL-MCNC: 7.4 G/DL (ref 6–8.4)
RBC # BLD AUTO: 4.04 M/UL (ref 4.6–6.2)
SODIUM SERPL-SCNC: 139 MMOL/L (ref 136–145)
WBC # BLD AUTO: 3.45 K/UL (ref 3.9–12.7)

## 2022-03-23 PROCEDURE — 80053 COMPREHEN METABOLIC PANEL: CPT | Performed by: NURSE PRACTITIONER

## 2022-03-23 PROCEDURE — 85025 COMPLETE CBC W/AUTO DIFF WBC: CPT | Performed by: NURSE PRACTITIONER

## 2022-03-23 PROCEDURE — 86140 C-REACTIVE PROTEIN: CPT | Performed by: NURSE PRACTITIONER

## 2022-03-23 PROCEDURE — 85651 RBC SED RATE NONAUTOMATED: CPT | Performed by: NURSE PRACTITIONER

## 2022-03-23 PROCEDURE — 25000003 PHARM REV CODE 250: Performed by: NURSE PRACTITIONER

## 2022-03-23 PROCEDURE — 99284 EMERGENCY DEPT VISIT MOD MDM: CPT | Mod: 25

## 2022-03-23 RX ORDER — LISINOPRIL 5 MG/1
5 TABLET ORAL DAILY
COMMUNITY
Start: 2022-03-22

## 2022-03-23 RX ORDER — BACITRACIN ZINC 500 UNIT/G
OINTMENT (GRAM) TOPICAL 2 TIMES DAILY
Qty: 28 G | Refills: 0 | Status: SHIPPED | OUTPATIENT
Start: 2022-03-23 | End: 2022-04-02

## 2022-03-23 RX ADMIN — BACITRACIN, NEOMYCIN, POLYMYXIN B 1 EACH: 400; 3.5; 5 OINTMENT TOPICAL at 01:03

## 2022-03-23 NOTE — ED TRIAGE NOTES
Chief Complaint   Patient presents with    Foot Injury     Pt advised on march 3rd 2022 he was at the podiatrist office at [place] and they removed a calus on the lateral side of the first digit and now the location appears to be infected - pt advised his physician says it looks ok and now he wants to be evaluated here      Pt presents to ED with infection to R foot under big toe. Pt is diabetic and has neuropathy and went to podiatrist office at [place] where they shaved his callus down. Wound appears to be infected and has blue discoloring, March 3rd was his appointment. AAOX4 and in no acute distress.

## 2022-03-23 NOTE — DISCHARGE INSTRUCTIONS
We have reviewed the notes from your podiatrist in the ER.  You have no acute signs of infection.  Please continue to apply topical ointment twice a day to promote healing.  Follow-up with your podiatrist as scheduled.    Our goal in the emergency department is to always give you outstanding care and exceptional service. You may receive a survey by mail or e-mail in the next week regarding your experience in our ED. We would greatly appreciate your completing and returning the survey. Your feedback provides us with a way to recognize our staff who give very good care and it helps us learn how to improve when your experience was below our aspiration of excellence.

## 2022-03-23 NOTE — ED PROVIDER NOTES
Encounter Date: 3/23/2022       History     Chief Complaint   Patient presents with    Foot Injury     Pt advised on march 3rd 2022 he was at the podiatrist office at [facility] and they removed a calus on the lateral side of the first digit and now the location appears to be infected - pt advised his physician says it looks ok and now he wants to be evaluated here      Patient is a 56-year-old male with medical history of HIV, diabetes, hypertension presenting to the ED for right great toe foot wound.  Patient states he was seen by podiatry on 03/03 and had a bunion removed.  Patient states he has was cut at that time and is concerned that it is not healing appropriately.  Patient was seen again on 03/07 and advised healing was appropriate due to silver nitrate usage.  Patient states he was advised to apply ointment daily but has not been able to for the past 4 days due to losing his housing.  Patient endorses tenderness near callus.  Patient denies any fevers, chills or difficulty ambulating.    The history is provided by the patient and medical records.     Review of patient's allergies indicates:   Allergen Reactions    Duloxetine     Lithium analogues     Tramadol      Past Medical History:   Diagnosis Date    Diabetes mellitus     HIV (human immunodeficiency virus infection) 1998    Hypertension      History reviewed. No pertinent surgical history.  History reviewed. No pertinent family history.  Social History     Tobacco Use    Smoking status: Current Some Day Smoker    Smokeless tobacco: Never Used   Substance Use Topics    Alcohol use: Yes     Comment: OCC    Drug use: Not Currently     Review of Systems   Constitutional: Negative for fever.   HENT: Negative for sore throat.    Respiratory: Negative for shortness of breath.    Cardiovascular: Negative for chest pain.   Gastrointestinal: Negative for nausea.   Genitourinary: Negative for dysuria.   Musculoskeletal: Negative for back pain and  myalgias.   Skin: Positive for color change ( right great toe) and wound ( right great toe). Negative for rash.   Allergic/Immunologic: Negative for immunocompromised state.   Neurological: Negative for dizziness, syncope, weakness and numbness.   Hematological: Does not bruise/bleed easily.   All other systems reviewed and are negative.      Physical Exam     Initial Vitals [03/23/22 0953]   BP Pulse Resp Temp SpO2   111/70 85 16 97.9 °F (36.6 °C) 99 %      MAP       --         Physical Exam    Nursing note and vitals reviewed.  Constitutional: Vital signs are normal. He appears well-developed and well-nourished. He is cooperative. No distress.   HENT:   Head: Normocephalic and atraumatic.   Mouth/Throat: Mucous membranes are normal.   Eyes: EOM and lids are normal. Pupils are equal, round, and reactive to light.   Neck: Trachea normal and phonation normal. Neck supple.   Normal range of motion.  Cardiovascular: Normal rate, regular rhythm and intact distal pulses.   Pulses:       Radial pulses are 2+ on the right side and 2+ on the left side.   Pulmonary/Chest: Effort normal and breath sounds normal.   Musculoskeletal:         General: Normal range of motion.      Cervical back: Normal range of motion and neck supple.      Right foot: Normal range of motion and normal capillary refill. Bunion ( great toe) and tenderness ( great toe) present. No swelling, bony tenderness or crepitus. Normal pulse.        Feet:      Neurological: He is alert and oriented to person, place, and time. He has normal strength. No sensory deficit. He displays a negative Romberg sign. GCS eye subscore is 4. GCS verbal subscore is 5. GCS motor subscore is 6.   Skin: Skin is warm, dry and intact. Capillary refill takes 2 to 3 seconds. No abrasion, no laceration and no rash noted. No cyanosis. Nails show no clubbing.             ED Course   Procedures  Labs Reviewed   CBC W/ AUTO DIFFERENTIAL - Abnormal; Notable for the following components:        Result Value    WBC 3.45 (*)     RBC 4.04 (*)     Hemoglobin 12.8 (*)     Hematocrit 38.2 (*)     MCH 31.7 (*)     Gran # (ANC) 1.2 (*)     Gran % 35.1 (*)     Lymph % 49.0 (*)     All other components within normal limits   COMPREHENSIVE METABOLIC PANEL - Abnormal; Notable for the following components:    BUN 27 (*)     All other components within normal limits   SEDIMENTATION RATE - Abnormal; Notable for the following components:    Sed Rate 32 (*)     All other components within normal limits   C-REACTIVE PROTEIN          Imaging Results          X-Ray Foot Complete Right (Final result)  Result time 03/23/22 11:06:47    Final result by Ketan Fox MD (03/23/22 11:06:47)                 Impression:      Soft tissue irregularity and possible subcutaneous emphysema adjacent to the 1st toe.  Suggest correlation with direct visualization.    No displaced fracture.      Electronically signed by: Ketan Fox MD  Date:    03/23/2022  Time:    11:06             Narrative:    EXAMINATION:  XR FOOT COMPLETE 3 VIEW RIGHT    CLINICAL HISTORY:  Unspecified injury of right foot, initial encounter.    TECHNIQUE:  AP, lateral, and oblique views of the right foot were performed.    COMPARISON:  None.    FINDINGS:  No evidence of acute fracture or dislocation.  There are moderate degenerative changes, most pronounced in the 1st toe.  Soft tissue irregularity and possible subcutaneous emphysema along the medial aspect of the 1st toe.  No convincing bony erosion.  No unexpected radiopaque foreign body.                              X-Rays:   Independently Interpreted Readings:   Other Readings:  Reviewed by me, read by Radiology    Medications   neomycin-bacitracnZn-polymyxnB packet 1 each (has no administration in time range)     Medical Decision Making:   Initial Assessment:   Emergent evaluation of a 55 yo male presenting for diabetic foot wound to base of right great toe.  Patient states he was seen by podiatry on  3/strain had a bunion removed.  Patient states pain and concern for infection since that time.  Patient states he was prescribed ointment but has not been able use for the past 4 days due to housing issues.  On exam pt is A&Ox3. VSS. Nonfebrile and nontoxic appearing. Breath sounds clear bilaterally. Mucous membranes pink and moist.  Foot wound noted to base of right great toe.  Picture in chart.  Mild tenderness noted.  No erythema or drainage noted.  No signs of active infection.  Pt speaking in full sentences.  Steady gait appreciated. Cap refill < 3 seconds.      Differential Diagnosis:   Differential diagnoses include but are not limited to diabetic foot wound, wound care, cellulitis, osteomyelitis, bunion, others.      ED Management:  I will get labs, imaging and reassess.  I discussed this case with my supervising physician.    Reviewed patient's chart from visit on 03/03 and 3/7 with Podiatry.  Silver nitrate was used for bunion removal.  Skin at base of toe appears to be from silver nitrate.             ED Course as of 03/23/22 1311   Wed Mar 23, 2022   1216 CBC unremarkable.  No leukocytosis noted.  H&H stable 12.8 and 38.2.  CMP unremarkable.  CRP within normal limits.  X-ray shows soft tissue irregularity and possible subcutaneous emphysema.  Awaiting ESR. [RZ]   1308 ESR slightly elevated.  Patient has no infectious signs.  Will discharge home with bacitracin prescription.  Advised to follow up with podiatrist.  Patient verbalized understanding of this plan of care.  Questions and concerns addressed. [RZ]   1310 Patient is hemodynamically stable, vital signs are normal. Discharge instructions given. Return to ED precautions discussed. Follow up as directed. Pt verbalized understanding of this plan.  Pt is stable for discharge.  [RZ]      ED Course User Index  [RZ] Di Cortes NP             Clinical Impression:   Final diagnoses:  [S99.921A] Injury of right great toe  [Z51.89] Visit for wound  check (Primary)          ED Disposition Condition    Discharge Stable        ED Prescriptions     Medication Sig Dispense Start Date End Date Auth. Provider    bacitracin 500 unit/gram Oint Apply topically 2 (two) times daily. for 10 days 28 g 3/23/2022 4/2/2022 Di Cortes NP        Follow-up Information     Follow up With Specialties Details Why Contact Info    [facility] Internal Medicine, Family Medicine Schedule an appointment as soon as possible for a visit  As needed [address] [facility] [address]  [phone]      Ketan Coon DPM Podiatry Schedule an appointment as soon as possible for a visit   [address] [facility] [address]  [phone]             Di Cortes NP  03/23/22 1311

## 2022-04-06 ENCOUNTER — HOSPITAL ENCOUNTER (EMERGENCY)
Facility: OTHER | Age: 57
Discharge: HOME | End: 2022-04-06
Attending: EMERGENCY MEDICINE
Payer: MEDICARE

## 2022-04-06 VITALS
HEART RATE: 70 BPM | HEIGHT: 71 IN | DIASTOLIC BLOOD PRESSURE: 62 MMHG | OXYGEN SATURATION: 97 % | SYSTOLIC BLOOD PRESSURE: 108 MMHG | TEMPERATURE: 98 F | WEIGHT: 184 LBS | RESPIRATION RATE: 18 BRPM | BODY MASS INDEX: 25.76 KG/M2

## 2022-04-06 DIAGNOSIS — S00.03XA CONTUSION OF SCALP, INITIAL ENCOUNTER: Primary | ICD-10-CM

## 2022-04-06 DIAGNOSIS — M62.838 TRAPEZIUS MUSCLE SPASM: ICD-10-CM

## 2022-04-06 PROCEDURE — 99284 EMERGENCY DEPT VISIT MOD MDM: CPT | Mod: 25

## 2022-04-06 PROCEDURE — 63600175 PHARM REV CODE 636 W HCPCS: Performed by: EMERGENCY MEDICINE

## 2022-04-06 PROCEDURE — 96372 THER/PROPH/DIAG INJ SC/IM: CPT | Performed by: EMERGENCY MEDICINE

## 2022-04-06 RX ORDER — IBUPROFEN 600 MG/1
600 TABLET ORAL EVERY 6 HOURS PRN
Qty: 20 TABLET | Refills: 0 | Status: SHIPPED | OUTPATIENT
Start: 2022-04-06

## 2022-04-06 RX ORDER — ORPHENADRINE CITRATE 30 MG/ML
60 INJECTION INTRAMUSCULAR; INTRAVENOUS
Status: COMPLETED | OUTPATIENT
Start: 2022-04-06 | End: 2022-04-06

## 2022-04-06 RX ORDER — TIZANIDINE 2 MG/1
4 TABLET ORAL EVERY 6 HOURS PRN
Qty: 15 TABLET | Refills: 0 | Status: SHIPPED | OUTPATIENT
Start: 2022-04-06 | End: 2022-04-16

## 2022-04-06 RX ORDER — KETOROLAC TROMETHAMINE 30 MG/ML
30 INJECTION, SOLUTION INTRAMUSCULAR; INTRAVENOUS
Status: COMPLETED | OUTPATIENT
Start: 2022-04-06 | End: 2022-04-06

## 2022-04-06 RX ADMIN — ORPHENADRINE CITRATE 60 MG: 30 INJECTION INTRAMUSCULAR; INTRAVENOUS at 10:04

## 2022-04-06 RX ADMIN — KETOROLAC TROMETHAMINE 30 MG: 30 INJECTION, SOLUTION INTRAMUSCULAR at 10:04

## 2022-04-06 NOTE — ED NOTES
HPI:  Pt to ED13 with c/o headache and right shoulder/neck/chest/hip pain s/p fall out of bed at 3:30am. States he has neuropathy from the waist down and so he tries to reposition himself in bed every so often; reports he was trying to roll to the other side of his body while in bed and feel out of the bed striking the back of his head and right side. Denies loc.

## 2022-04-06 NOTE — ED PROVIDER NOTES
"SCRIBE #1 NOTE: I, Rhona , am scribing for, and in the presence of, Bennie Crum DO.         Source of History:  The patient.    Chief complaint:  shoulder pain, headache (Pt c.o right shoulder pain and right side neck pain onset this 315 AM after falling out of bed at the [facility]. -LOC.  Pt states he hit head on night stand. Pt also c.o pressure behind right eye. Pt denies blood thinners. Pt continues to name multiple complaints.  AAO x 3 nadn skin w.d no obvious knot on back of head where pt is pointing he hit head.  Pt able to move right arm and shoulder. Pt states produces pain when moving.  )      HPI:  Ang Loya is a 56 y.o. male with PMHx of HTN presenting with right shoulder pain that began last night. He states that he was asleep when he proceeded to fall off of his bed, hitting his head and his right side on his night stand. He took gabapentin with some relief. He denies vomiting.    This is the extent to the patients complaints today here in the emergency department.    ROS: As per HPI and below:  Constitutional: No fever.  No chills.  Eyes: No visual changes.   ENT: No sore throat. No ear pain.  Urinary: No abnormal urination.  MSK: Right-sided shoulder pain.  Integument: No rashes or lesions.    Review of patient's allergies indicates:   Allergen Reactions    Duloxetine     Lithium analogues     Tramadol        PMH:  As per HPI and below:  Past Medical History:   Diagnosis Date    Diabetes mellitus     HIV (human immunodeficiency virus infection) 1998    Hypertension      No past surgical history on file.    Social History     Tobacco Use    Smoking status: Current Some Day Smoker    Smokeless tobacco: Never Used   Substance Use Topics    Alcohol use: Yes     Comment: OCC    Drug use: Not Currently       Physical Exam:    /62   Pulse 70   Temp 98.3 °F (36.8 °C) (Oral)   Resp 18   Ht 5' 11" (1.803 m)   Wt 83.5 kg (184 lb)   SpO2 97%   BMI 25.66 kg/m²   Nursing " note and vital signs reviewed.  Constitutional: No acute distress.  Nontoxic  Head:  Normocephalic atraumatic. No bruising.  Eyes: No conjunctival injection.  Extraocular muscles are intact.  ENT: Normal phonation.  Musculoskeletal: Right sided trapezius: spasm and tenderness to palpation. Full ROM of the shoulder. No deformity or crepitus.  Skin: No rashes seen.  Good turgor.  No abrasions.  No ecchymoses.  Psych: Appropriate, conversant.        I decided to obtain the patient's medical records.  Summary of Medical Records:      MDM/ Differential Dx:   56 y.o. male with a fall out of bed.  No obvious evidence of trauma.  Not on blood thinners.  No findings on my examination red flags to suggest intracranial hemorrhage or contusion do not feel imaging of the brain is indicated.  Also complained of right shoulder pain.  There is no obvious fracture or dislocation.  Appears to be musculoskeletal.  Will give anti-inflammatories and muscle relaxant and discharge.    ED Course as of 04/06/22 1026   Wed Apr 06, 2022   1021 Patient to be discharged home in stable condition. Diagnosis and treatment plan explained to patient and/or family member who is at bedside. I have answered all questions and the patient is satisfied with the plan of care. Strict return precautions given. The patient demonstrates understanding of the care plan. [SM]   1025 Discharge the patient home with [facility] discharge instructions for head injury and adults after the visit.  As well as neck strain. [SM]      ED Course User Index  [SM] Bennie Crum, DO              Scribe Attestation:   Scribe #1: I performed the above scribed service and the documentation accurately describes the services I performed. I attest to the accuracy of the note.    Physician Attestation for Scribe: I, Dr Bennie Crum, reviewed documentation as scribed in my presence, which is both accurate and complete.    Diagnostic Impression:    1. Contusion of  scalp, initial encounter    2. Trapezius muscle spasm         ED Disposition Condition    Discharge Stable          ED Prescriptions     Medication Sig Dispense Start Date End Date Auth. Provider    ibuprofen (ADVIL,MOTRIN) 600 MG tablet Take 1 tablet (600 mg total) by mouth every 6 (six) hours as needed for Pain. 20 tablet 4/6/2022  Bennie Crum DO    tiZANidine (ZANAFLEX) 2 MG tablet Take 2 tablets (4 mg total) by mouth every 6 (six) hours as needed (muscle spasm). 15 tablet 4/6/2022 4/16/2022 Bennie Crum DO        Follow-up Information     Follow up With Specialties Details Why Contact Info    [facility] [place]   or your doctor [address] [facility] [id number] [phone]             Bennie Crum DO  04/06/22 1027

## 2023-04-27 ENCOUNTER — HOSPITAL ENCOUNTER (EMERGENCY)
Facility: OTHER | Age: 58
Discharge: HOME | End: 2023-04-27
Attending: EMERGENCY MEDICINE
Payer: MEDICARE

## 2023-04-27 VITALS
HEIGHT: 71 IN | WEIGHT: 197 LBS | RESPIRATION RATE: 18 BRPM | BODY MASS INDEX: 27.58 KG/M2 | HEART RATE: 85 BPM | DIASTOLIC BLOOD PRESSURE: 75 MMHG | TEMPERATURE: 98 F | OXYGEN SATURATION: 96 % | SYSTOLIC BLOOD PRESSURE: 119 MMHG

## 2023-04-27 DIAGNOSIS — R05.8 PRODUCTIVE COUGH: ICD-10-CM

## 2023-04-27 DIAGNOSIS — J06.9 VIRAL URI WITH COUGH: Primary | ICD-10-CM

## 2023-04-27 LAB
CTP QC/QA: YES
CTP QC/QA: YES
POC MOLECULAR INFLUENZA A AGN: NEGATIVE
POC MOLECULAR INFLUENZA B AGN: NEGATIVE
SARS-COV-2 RDRP RESP QL NAA+PROBE: NEGATIVE

## 2023-04-27 PROCEDURE — 99283 EMERGENCY DEPT VISIT LOW MDM: CPT | Mod: 25

## 2023-04-27 RX ORDER — BENZONATATE 100 MG/1
100 CAPSULE ORAL 3 TIMES DAILY PRN
Qty: 30 CAPSULE | Refills: 0 | Status: SHIPPED | OUTPATIENT
Start: 2023-04-27 | End: 2023-05-07

## 2023-04-27 NOTE — FIRST PROVIDER EVALUATION
Emergency Department TeleTriage Encounter Note      CHIEF COMPLAINT    Chief Complaint   Patient presents with    Cough     C/o productive cough with yellow sputum x 5 days. -CP -SOB. Denies any other complaints. VSS       VITAL SIGNS   Initial Vitals [04/27/23 1040]   BP Pulse Resp Temp SpO2   120/71 81 18 98.5 °F (36.9 °C) 97 %      MAP       --            ALLERGIES    Review of patient's allergies indicates:   Allergen Reactions    Duloxetine     Lithium analogues     Tramadol        PROVIDER TRIAGE NOTE  TeleTriage Note: Ang Loya, a nontoxic/well appearing, 57 y.o. male, presented to the ED with c/o cough and pain with coughing since Monday. Denies fevers. Yellow mucous when coughing.     All ED beds are full at present; patient notified of this status.  Patient seen and medically screened by Nurse Practitioner via teletriage. Orders initiated at triage to expedite care.  Patient is stable to return to the waiting room and will be placed in an ED bed when available.  Care will be transferred to an alternate provider when patient has been placed in an Exam Room from the [facility] for physical exam, additional orders, and disposition.  10:49 AM Yohana Back, DNP, FNP-C        ORDERS  Labs Reviewed - No data to display    ED Orders (720h ago, onward)      Start Ordered     Status Ordering Provider    04/27/23 1051 04/27/23 1050  X-Ray Chest PA And Lateral  1 time imaging         Ordered YOHANA BACK    Unscheduled 04/27/23 1051  POCT COVID-19 Rapid Screening  Once         Ordered YOHANA BACK    Unscheduled 04/27/23 1051  POCT Influenza A/B Molecular  Once         Ordered YOHANA BACK              Virtual Visit Note: The provider triage portion of this emergency department evaluation and documentation was performed via [employer], a HIPAA-compliant telemedicine application, in concert with a tele-presenter in the room. A face to face patient evaluation with one of my colleagues will occur once  the patient is placed in an emergency department room.      DISCLAIMER: This note was prepared with [employer] voice recognition transcription software. Garbled syntax, mangled pronouns, and other bizarre constructions may be attributed to that software system.

## 2023-04-27 NOTE — ED TRIAGE NOTES
Pt presents to the ER with complaints of a cough with yellow sputum onset this past Monday. No other complaints at this time.

## 2023-04-27 NOTE — ED PROVIDER NOTES
"Encounter Date: 4/27/2023       History     Chief Complaint   Patient presents with    Cough     C/o productive cough with yellow sputum x 5 days. -CP -SOB. Denies any other complaints. VSS     57-year-old male with history of hypertension, diabetes, and HIV with undetectable viral load and CD4 count well controlled on medications presents to the emergency department for evaluation of productive cough for 5 days.  Reports associated with pain when coughing.  Currently a resident of [facility] and states that his roommate had COVID 2 weeks ago.  Reports associated headache, "scratchy throat", congestion and an episode of diarrhea. Patient states he was given Mucinex and ibuprofen at [facility] this morning, which provided minimal relief.  He denies fever, chills, runny nose, shortness of breath, chest pain, abdominal pain, nausea, vomiting, constipation, or urinary symptoms.     The history is provided by the patient.   Review of patient's allergies indicates:   Allergen Reactions    Duloxetine     Lithium analogues      Past Medical History:   Diagnosis Date    Diabetes mellitus     HIV (human immunodeficiency virus infection) 1998    Hypertension      History reviewed. No pertinent surgical history.  History reviewed. No pertinent family history.  Social History     Tobacco Use    Smoking status: Every Day     Packs/day: 0.50     Types: Cigarettes    Smokeless tobacco: Never   Substance Use Topics    Alcohol use: Not Currently     Comment: OCC    Drug use: Not Currently     Review of Systems   Constitutional:  Negative for chills and fever.   HENT:  Positive for congestion and sore throat. Negative for rhinorrhea.    Respiratory:  Positive for cough. Negative for shortness of breath.    Cardiovascular:  Negative for chest pain.   Gastrointestinal:  Positive for diarrhea. Negative for abdominal pain, nausea and vomiting.   Genitourinary:  Negative for dysuria, frequency and urgency.   Musculoskeletal:  " Negative for back pain.   Skin:  Negative for rash.   Neurological:  Positive for headaches. Negative for dizziness.   Psychiatric/Behavioral:  Negative for confusion.      Physical Exam     Initial Vitals [04/27/23 1040]   BP Pulse Resp Temp SpO2   120/71 81 18 98.5 °F (36.9 °C) 97 %      MAP       --         Physical Exam    Vitals reviewed.  Constitutional: He appears well-developed and well-nourished.   HENT:   Head: Normocephalic and atraumatic.   Right Ear: Tympanic membrane, external ear and ear canal normal.   Left Ear: Tympanic membrane, external ear and ear canal normal.   Nose: Nose normal.   Mouth/Throat: Oropharynx is clear and moist.   Eyes: Conjunctivae and EOM are normal. Pupils are equal, round, and reactive to light.   Neck: Neck supple.   Normal range of motion.  Cardiovascular:  Normal rate, regular rhythm, normal heart sounds and intact distal pulses.           Pulmonary/Chest: Breath sounds normal. No respiratory distress. He has no wheezes. He has no rhonchi. He has no rales.   Abdominal: Abdomen is soft. Bowel sounds are normal. He exhibits no distension. There is no abdominal tenderness. There is no rebound and no guarding.   Musculoskeletal:         General: Normal range of motion.      Cervical back: Normal range of motion and neck supple.     Neurological: He is alert and oriented to person, place, and time. He has normal strength.   Skin: Skin is warm and dry.   Psychiatric: He has a normal mood and affect. His behavior is normal. Judgment and thought content normal.       ED Course   Procedures  Labs Reviewed   SARS-COV-2 RDRP GENE   POCT INFLUENZA A/B MOLECULAR          Imaging Results              X-Ray Chest PA And Lateral (Final result)  Result time 04/27/23 11:40:10      Final result by Darcie Valdez MD (04/27/23 11:40:10)                   Impression:      No acute cardiopulmonary process seen.      Electronically signed by: Darcie Valdez  Date:    04/27/2023  Time:    11:40                Narrative:    EXAMINATION:  XR CHEST PA AND LATERAL    CLINICAL HISTORY:  Other specified cough    TECHNIQUE:  PA and lateral views of the chest were performed.    COMPARISON:  01/23/2020    FINDINGS:  Lungs are well expanded.  No acute consolidation, pleural effusion, or pneumothorax seen.    Cardiac silhouette is normal in size.                                    X-Rays:   Independently Interpreted Readings:   Chest X-Ray: No acute process.   Medications - No data to display  Medical Decision Making:   Initial Assessment:   Urgent evaluation of 57-year-old male with history of HIV well-controlled on medications, hypertension, and diabetes who presents with productive cough, cold symptoms, headache, an episode of diarrhea for the last 5 days.  He denies fever, shortness of breath, chest pain, abdominal pain, or nausea, vomiting, diarrhea.  Reports that he is currently staying at [facility] and has been around others who have been sick.  Vital signs are stable.  He is afebrile.  On exam, lungs are clear to auscultation.  He is nontoxic-appearing. Will check rapid COVID and rapid flu tests.  Awaiting results of chest x-ray.  No need for medications at this time due to patient stating he received ibuprofen and Mucinex at [facility] prior to arrival.  Clinical Tests:   Lab Tests: Ordered and Reviewed  Radiological Study: Ordered and Reviewed  ED Management:  On review of labs, rapid COVID and influenza tests are negative.  On review of x-ray, there is no acute process.  I updated the patient on all results.  He continues to feel comfortable in the ED. I explained to him that his symptoms are likely due to viral URI.  We will treat him symptomatically.  Will provide prescription for Tessalon Perles.  Advised the patient to take Tylenol, ibuprofen, and Mucinex for her symptoms.  He verbalized understanding and agreement with this plan of care.  He was given specific return precautions.  All questions  and concerns addressed.  Repeat vital signs are stable.       I discussed this case with my supervising physician.                        Clinical Impression:   Final diagnoses:  [R05.8] Productive cough  [J06.9] Viral URI with cough (Primary)        ED Disposition Condition    Discharge Stable          ED Prescriptions       Medication Sig Dispense Start Date End Date Auth. Provider    benzonatate (TESSALON) 100 MG capsule Take 1 capsule (100 mg total) by mouth 3 (three) times daily as needed for Cough. 30 capsule 4/27/2023 5/7/2023 Lars Ruiz PA-C          Follow-up Information    None          Lars Ruiz PA-C  04/27/23 3639

## 2023-04-27 NOTE — ED NOTES
LOC: The patient is awake, alert, and oriented to self, place, time, and situation. Pt is calm and cooperative. Affect is appropriate.  Speech is appropriate and clear.     APPEARANCE: Patient resting comfortably in no acute distress.  Patient is clean and well groomed.    SKIN: The skin is warm and dry; color consistent with ethnicity.  Patient has normal skin turgor and moist mucus membranes.  Skin intact; no breakdown or bruising noted.     MUSCULOSKELETAL: Patient moving upper and lower extremities without difficulty; denies pain in the extremities or back.  Denies weakness.     RESPIRATORY: Airway is open and patent. Respirations spontaneous, even, easy, and non-labored.  Patient has a normal effort and rate.  No accessory muscle use noted. Pt reports cough with yellow sputum production onset this past Monday.    CARDIAC: Normal rate noted. No peripheral edema noted. No complaints of chest pain.     ABDOMEN: Soft and non tender to palpation.  No distention noted. Pt denies abdominal pain; denies nausea, vomiting, diarrhea, or constipation.    NEUROLOGIC: Eyes open spontaneously.  Behavior appropriate to situation.  Follows commands; facial expression symmetrical.  Purposeful motor response noted; normal sensation in all extremities. Pt denies headache; denies lightheadedness or dizziness; denies visual disturbances; denies loss of balance; denies unilateral weakness.

## 2024-09-08 ENCOUNTER — HOSPITAL ENCOUNTER (INPATIENT)
Facility: OTHER | Age: 59
LOS: 3 days | Discharge: HOME | DRG: 392 | End: 2024-09-11
Attending: INTERNAL MEDICINE | Admitting: HOSPITALIST
Payer: MEDICARE

## 2024-09-08 DIAGNOSIS — R07.9 CHEST PAIN: ICD-10-CM

## 2024-09-08 DIAGNOSIS — K56.609 SBO (SMALL BOWEL OBSTRUCTION): ICD-10-CM

## 2024-09-08 DIAGNOSIS — N17.9 AKI (ACUTE KIDNEY INJURY): ICD-10-CM

## 2024-09-08 DIAGNOSIS — R19.7 DIARRHEA, UNSPECIFIED TYPE: ICD-10-CM

## 2024-09-08 DIAGNOSIS — R10.9 ABDOMINAL PAIN, UNSPECIFIED ABDOMINAL LOCATION: Primary | ICD-10-CM

## 2024-09-08 DIAGNOSIS — R11.2 NAUSEA AND VOMITING, UNSPECIFIED VOMITING TYPE: ICD-10-CM

## 2024-09-08 DIAGNOSIS — K56.7 ILEUS: ICD-10-CM

## 2024-09-08 PROBLEM — F99 CHRONIC MENTAL ILLNESS: Status: ACTIVE | Noted: 2024-09-08

## 2024-09-08 LAB
ALBUMIN SERPL BCP-MCNC: 4.1 G/DL (ref 3.5–5.2)
ALP SERPL-CCNC: 74 U/L (ref 55–135)
ALT SERPL W/O P-5'-P-CCNC: 46 U/L (ref 10–44)
AMPHET+METHAMPHET UR QL: NEGATIVE
ANION GAP SERPL CALC-SCNC: 15 MMOL/L (ref 8–16)
AST SERPL-CCNC: 35 U/L (ref 10–40)
BACTERIA #/AREA URNS HPF: ABNORMAL /HPF
BARBITURATES UR QL SCN>200 NG/ML: NEGATIVE
BASOPHILS # BLD AUTO: 0.02 K/UL (ref 0–0.2)
BASOPHILS NFR BLD: 0.2 % (ref 0–1.9)
BENZODIAZ UR QL SCN>200 NG/ML: ABNORMAL
BILIRUB SERPL-MCNC: 0.6 MG/DL (ref 0.1–1)
BILIRUB UR QL STRIP: NEGATIVE
BUN SERPL-MCNC: 22 MG/DL (ref 6–20)
BZE UR QL SCN: NEGATIVE
CALCIUM SERPL-MCNC: 10.1 MG/DL (ref 8.7–10.5)
CANNABINOIDS UR QL SCN: ABNORMAL
CHLORIDE SERPL-SCNC: 107 MMOL/L (ref 95–110)
CLARITY UR: CLEAR
CO2 SERPL-SCNC: 17 MMOL/L (ref 23–29)
COLOR UR: YELLOW
CREAT SERPL-MCNC: 1.5 MG/DL (ref 0.5–1.4)
CREAT UR-MCNC: 220.3 MG/DL (ref 23–375)
CTP QC/QA: YES
DIFFERENTIAL METHOD BLD: ABNORMAL
EOSINOPHIL # BLD AUTO: 0.1 K/UL (ref 0–0.5)
EOSINOPHIL NFR BLD: 1.1 % (ref 0–8)
ERYTHROCYTE [DISTWIDTH] IN BLOOD BY AUTOMATED COUNT: 13 % (ref 11.5–14.5)
EST. GFR  (NO RACE VARIABLE): 54 ML/MIN/1.73 M^2
ESTIMATED AVG GLUCOSE: 123 MG/DL (ref 68–131)
ETHANOL UR-MCNC: <10 MG/DL
EXTRA BLUE TOP RAINBOW: NORMAL
GLUCOSE SERPL-MCNC: 154 MG/DL (ref 70–110)
GLUCOSE UR QL STRIP: NEGATIVE
HBA1C MFR BLD: 5.9 % (ref 4–5.6)
HCT VFR BLD AUTO: 51.9 % (ref 40–54)
HGB BLD-MCNC: 17.8 G/DL (ref 14–18)
HGB UR QL STRIP: NEGATIVE
HYALINE CASTS #/AREA URNS LPF: 4 /LPF
IMM GRANULOCYTES # BLD AUTO: 0.03 K/UL (ref 0–0.04)
IMM GRANULOCYTES NFR BLD AUTO: 0.3 % (ref 0–0.5)
KETONES UR QL STRIP: NEGATIVE
LEUKOCYTE ESTERASE UR QL STRIP: NEGATIVE
LIPASE SERPL-CCNC: 62 U/L (ref 4–60)
LYMPHOCYTES # BLD AUTO: 1.4 K/UL (ref 1–4.8)
LYMPHOCYTES NFR BLD: 11.9 % (ref 18–48)
MCH RBC QN AUTO: 31.7 PG (ref 27–31)
MCHC RBC AUTO-ENTMCNC: 34.3 G/DL (ref 32–36)
MCV RBC AUTO: 93 FL (ref 82–98)
METHADONE UR QL SCN>300 NG/ML: NEGATIVE
MICROSCOPIC COMMENT: ABNORMAL
MONOCYTES # BLD AUTO: 1 K/UL (ref 0.3–1)
MONOCYTES NFR BLD: 8.7 % (ref 4–15)
NEUTROPHILS # BLD AUTO: 9.3 K/UL (ref 1.8–7.7)
NEUTROPHILS NFR BLD: 77.8 % (ref 38–73)
NITRITE UR QL STRIP: NEGATIVE
NRBC BLD-RTO: 0 /100 WBC
OPIATES UR QL SCN: ABNORMAL
PCP UR QL SCN>25 NG/ML: NEGATIVE
PH UR STRIP: 6 [PH] (ref 5–8)
PLATELET # BLD AUTO: 258 K/UL (ref 150–450)
PMV BLD AUTO: 10.7 FL (ref 9.2–12.9)
POCT GLUCOSE: 111 MG/DL (ref 70–110)
POCT GLUCOSE: 97 MG/DL (ref 70–110)
POTASSIUM SERPL-SCNC: 5.1 MMOL/L (ref 3.5–5.1)
PROT SERPL-MCNC: 9 G/DL (ref 6–8.4)
PROT UR QL STRIP: ABNORMAL
RBC # BLD AUTO: 5.61 M/UL (ref 4.6–6.2)
RBC #/AREA URNS HPF: 2 /HPF (ref 0–4)
SARS-COV-2 RDRP RESP QL NAA+PROBE: NEGATIVE
SODIUM SERPL-SCNC: 139 MMOL/L (ref 136–145)
SP GR UR STRIP: >1.03 (ref 1–1.03)
SQUAMOUS #/AREA URNS HPF: 0 /HPF
TOXICOLOGY INFORMATION: ABNORMAL
URN SPEC COLLECT METH UR: ABNORMAL
UROBILINOGEN UR STRIP-ACNC: NEGATIVE EU/DL
WBC # BLD AUTO: 11.89 K/UL (ref 3.9–12.7)
WBC #/AREA URNS HPF: 1 /HPF (ref 0–5)

## 2024-09-08 PROCEDURE — 96361 HYDRATE IV INFUSION ADD-ON: CPT

## 2024-09-08 PROCEDURE — 96374 THER/PROPH/DIAG INJ IV PUSH: CPT

## 2024-09-08 PROCEDURE — 96375 TX/PRO/DX INJ NEW DRUG ADDON: CPT

## 2024-09-08 PROCEDURE — 87045 FECES CULTURE AEROBIC BACT: CPT | Performed by: HOSPITALIST

## 2024-09-08 PROCEDURE — 63600175 PHARM REV CODE 636 W HCPCS: Performed by: NURSE PRACTITIONER

## 2024-09-08 PROCEDURE — 87046 STOOL CULTR AEROBIC BACT EA: CPT | Performed by: HOSPITALIST

## 2024-09-08 PROCEDURE — 87635 SARS-COV-2 COVID-19 AMP PRB: CPT | Performed by: INTERNAL MEDICINE

## 2024-09-08 PROCEDURE — 83036 HEMOGLOBIN GLYCOSYLATED A1C: CPT | Performed by: NURSE PRACTITIONER

## 2024-09-08 PROCEDURE — 25000003 PHARM REV CODE 250: Performed by: INTERNAL MEDICINE

## 2024-09-08 PROCEDURE — 96376 TX/PRO/DX INJ SAME DRUG ADON: CPT

## 2024-09-08 PROCEDURE — 87427 SHIGA-LIKE TOXIN AG IA: CPT | Mod: 59 | Performed by: HOSPITALIST

## 2024-09-08 PROCEDURE — 83690 ASSAY OF LIPASE: CPT | Performed by: INTERNAL MEDICINE

## 2024-09-08 PROCEDURE — 25500020 PHARM REV CODE 255: Performed by: INTERNAL MEDICINE

## 2024-09-08 PROCEDURE — 21400001 HC TELEMETRY ROOM

## 2024-09-08 PROCEDURE — 80053 COMPREHEN METABOLIC PANEL: CPT | Performed by: INTERNAL MEDICINE

## 2024-09-08 PROCEDURE — 85025 COMPLETE CBC W/AUTO DIFF WBC: CPT | Performed by: INTERNAL MEDICINE

## 2024-09-08 PROCEDURE — 63600175 PHARM REV CODE 636 W HCPCS: Performed by: INTERNAL MEDICINE

## 2024-09-08 PROCEDURE — 87449 NOS EACH ORGANISM AG IA: CPT | Performed by: HOSPITALIST

## 2024-09-08 PROCEDURE — 99285 EMERGENCY DEPT VISIT HI MDM: CPT | Mod: 25

## 2024-09-08 PROCEDURE — 36415 COLL VENOUS BLD VENIPUNCTURE: CPT | Performed by: INTERNAL MEDICINE

## 2024-09-08 PROCEDURE — 81000 URINALYSIS NONAUTO W/SCOPE: CPT | Performed by: INTERNAL MEDICINE

## 2024-09-08 PROCEDURE — 87449 NOS EACH ORGANISM AG IA: CPT | Mod: 91 | Performed by: HOSPITALIST

## 2024-09-08 PROCEDURE — 27000207 HC ISOLATION

## 2024-09-08 PROCEDURE — 99223 1ST HOSP IP/OBS HIGH 75: CPT | Mod: ,,, | Performed by: SPECIALIST

## 2024-09-08 PROCEDURE — 87324 CLOSTRIDIUM AG IA: CPT | Performed by: HOSPITALIST

## 2024-09-08 PROCEDURE — 36415 COLL VENOUS BLD VENIPUNCTURE: CPT | Performed by: NURSE PRACTITIONER

## 2024-09-08 PROCEDURE — 25000003 PHARM REV CODE 250: Performed by: NURSE PRACTITIONER

## 2024-09-08 PROCEDURE — 80307 DRUG TEST PRSMV CHEM ANLYZR: CPT | Performed by: HOSPITALIST

## 2024-09-08 RX ORDER — KETOROLAC TROMETHAMINE 30 MG/ML
10 INJECTION, SOLUTION INTRAMUSCULAR; INTRAVENOUS
Status: COMPLETED | OUTPATIENT
Start: 2024-09-08 | End: 2024-09-08

## 2024-09-08 RX ORDER — INSULIN ASPART 100 [IU]/ML
0-5 INJECTION, SOLUTION INTRAVENOUS; SUBCUTANEOUS EVERY 6 HOURS PRN
Status: DISCONTINUED | OUTPATIENT
Start: 2024-09-08 | End: 2024-09-11 | Stop reason: HOSPADM

## 2024-09-08 RX ORDER — ASPIRIN 81 MG/1
81 TABLET ORAL
COMMUNITY
Start: 2024-07-17

## 2024-09-08 RX ORDER — NALOXONE HCL 0.4 MG/ML
0.02 VIAL (ML) INJECTION
Status: DISCONTINUED | OUTPATIENT
Start: 2024-09-08 | End: 2024-09-11 | Stop reason: HOSPADM

## 2024-09-08 RX ORDER — ONDANSETRON HYDROCHLORIDE 2 MG/ML
4 INJECTION, SOLUTION INTRAVENOUS EVERY 6 HOURS PRN
Status: DISCONTINUED | OUTPATIENT
Start: 2024-09-08 | End: 2024-09-11 | Stop reason: HOSPADM

## 2024-09-08 RX ORDER — ONDANSETRON HYDROCHLORIDE 2 MG/ML
4 INJECTION, SOLUTION INTRAVENOUS
Status: COMPLETED | OUTPATIENT
Start: 2024-09-08 | End: 2024-09-08

## 2024-09-08 RX ORDER — GLUCAGON 1 MG
1 KIT INJECTION
Status: DISCONTINUED | OUTPATIENT
Start: 2024-09-08 | End: 2024-09-11 | Stop reason: HOSPADM

## 2024-09-08 RX ORDER — NEOMYCIN SULFATE, POLYMYXIN B SULFATE AND DEXAMETHASONE 3.5; 10000; 1 MG/ML; [USP'U]/ML; MG/ML
1 SUSPENSION/ DROPS OPHTHALMIC 3 TIMES DAILY
COMMUNITY
Start: 2024-08-22

## 2024-09-08 RX ORDER — AMITRIPTYLINE HYDROCHLORIDE 50 MG/1
50 TABLET, FILM COATED ORAL NIGHTLY
COMMUNITY

## 2024-09-08 RX ORDER — METOPROLOL SUCCINATE 25 MG/1
25 TABLET, EXTENDED RELEASE ORAL
COMMUNITY

## 2024-09-08 RX ORDER — MORPHINE SULFATE 2 MG/ML
2 INJECTION, SOLUTION INTRAMUSCULAR; INTRAVENOUS
Status: COMPLETED | OUTPATIENT
Start: 2024-09-08 | End: 2024-09-08

## 2024-09-08 RX ORDER — MULTIVITAMIN WITH FOLIC ACID 400 MCG
1 TABLET ORAL
COMMUNITY
Start: 2024-07-17

## 2024-09-08 RX ORDER — ACETAMINOPHEN 325 MG/1
650 TABLET ORAL EVERY 4 HOURS PRN
Status: DISCONTINUED | OUTPATIENT
Start: 2024-09-08 | End: 2024-09-11 | Stop reason: HOSPADM

## 2024-09-08 RX ORDER — ALPRAZOLAM 0.5 MG/1
2 TABLET ORAL 2 TIMES DAILY PRN
Status: DISCONTINUED | OUTPATIENT
Start: 2024-09-08 | End: 2024-09-11 | Stop reason: HOSPADM

## 2024-09-08 RX ORDER — KETOROLAC TROMETHAMINE 30 MG/ML
15 INJECTION, SOLUTION INTRAMUSCULAR; INTRAVENOUS EVERY 6 HOURS PRN
Status: ACTIVE | OUTPATIENT
Start: 2024-09-08 | End: 2024-09-11

## 2024-09-08 RX ORDER — FAMOTIDINE 10 MG/ML
20 INJECTION INTRAVENOUS 2 TIMES DAILY
Status: DISCONTINUED | OUTPATIENT
Start: 2024-09-08 | End: 2024-09-11 | Stop reason: HOSPADM

## 2024-09-08 RX ORDER — DIVALPROEX SODIUM 250 MG/1
250 TABLET, FILM COATED, EXTENDED RELEASE ORAL 2 TIMES DAILY
COMMUNITY
Start: 2024-08-20

## 2024-09-08 RX ORDER — DIVALPROEX SODIUM 250 MG/1
250 TABLET, FILM COATED, EXTENDED RELEASE ORAL 2 TIMES DAILY
Status: DISCONTINUED | OUTPATIENT
Start: 2024-09-08 | End: 2024-09-08

## 2024-09-08 RX ORDER — DIPHENHYDRAMINE HYDROCHLORIDE 50 MG/ML
25 INJECTION INTRAMUSCULAR; INTRAVENOUS EVERY 6 HOURS PRN
Status: DISCONTINUED | OUTPATIENT
Start: 2024-09-08 | End: 2024-09-11 | Stop reason: HOSPADM

## 2024-09-08 RX ORDER — HYDRALAZINE HYDROCHLORIDE 20 MG/ML
10 INJECTION INTRAMUSCULAR; INTRAVENOUS EVERY 8 HOURS PRN
Status: DISCONTINUED | OUTPATIENT
Start: 2024-09-08 | End: 2024-09-11 | Stop reason: HOSPADM

## 2024-09-08 RX ORDER — GABAPENTIN 300 MG/1
600 CAPSULE ORAL 3 TIMES DAILY
Status: DISCONTINUED | OUTPATIENT
Start: 2024-09-08 | End: 2024-09-11 | Stop reason: HOSPADM

## 2024-09-08 RX ORDER — LINAGLIPTIN 5 MG/1
5 TABLET, FILM COATED ORAL
COMMUNITY
Start: 2024-08-20

## 2024-09-08 RX ORDER — ASPIRIN 81 MG/1
81 TABLET ORAL DAILY
Status: DISCONTINUED | OUTPATIENT
Start: 2024-09-08 | End: 2024-09-11 | Stop reason: HOSPADM

## 2024-09-08 RX ORDER — FAMOTIDINE 20 MG/1
20 TABLET, FILM COATED ORAL
COMMUNITY
Start: 2024-08-20

## 2024-09-08 RX ORDER — ALBUTEROL SULFATE 90 UG/1
1 INHALANT RESPIRATORY (INHALATION) EVERY 4 HOURS PRN
Status: DISCONTINUED | OUTPATIENT
Start: 2024-09-08 | End: 2024-09-11 | Stop reason: HOSPADM

## 2024-09-08 RX ORDER — ALBUTEROL SULFATE 90 UG/1
1 INHALANT RESPIRATORY (INHALATION) EVERY 4 HOURS PRN
COMMUNITY
Start: 2024-09-04

## 2024-09-08 RX ORDER — TIRZEPATIDE 5 MG/.5ML
INJECTION, SOLUTION SUBCUTANEOUS
Status: ON HOLD | COMMUNITY
Start: 2024-08-07 | End: 2024-09-11 | Stop reason: HOSPADM

## 2024-09-08 RX ORDER — LISINOPRIL 5 MG/1
5 TABLET ORAL DAILY
Status: DISCONTINUED | OUTPATIENT
Start: 2024-09-08 | End: 2024-09-11 | Stop reason: HOSPADM

## 2024-09-08 RX ORDER — TIRZEPATIDE 2.5 MG/.5ML
INJECTION, SOLUTION SUBCUTANEOUS
Status: ON HOLD | COMMUNITY
Start: 2024-07-15 | End: 2024-09-08 | Stop reason: SDUPTHER

## 2024-09-08 RX ORDER — BICTEGRAVIR SODIUM, EMTRICITABINE, AND TENOFOVIR ALAFENAMIDE FUMARATE 50; 200; 25 MG/1; MG/1; MG/1
1 TABLET ORAL
COMMUNITY
Start: 2024-09-04

## 2024-09-08 RX ORDER — SODIUM CHLORIDE 9 MG/ML
INJECTION, SOLUTION INTRAVENOUS CONTINUOUS
Status: DISCONTINUED | OUTPATIENT
Start: 2024-09-08 | End: 2024-09-09

## 2024-09-08 RX ORDER — ALPRAZOLAM 2 MG/1
TABLET ORAL 2 TIMES DAILY PRN
COMMUNITY
Start: 2024-08-15

## 2024-09-08 RX ORDER — AMITRIPTYLINE HYDROCHLORIDE 25 MG/1
50 TABLET, FILM COATED ORAL NIGHTLY
Status: DISCONTINUED | OUTPATIENT
Start: 2024-09-08 | End: 2024-09-11 | Stop reason: HOSPADM

## 2024-09-08 RX ADMIN — ONDANSETRON 4 MG: 2 INJECTION INTRAMUSCULAR; INTRAVENOUS at 08:09

## 2024-09-08 RX ADMIN — VALPROATE SODIUM 250 MG: 100 INJECTION, SOLUTION INTRAVENOUS at 12:09

## 2024-09-08 RX ADMIN — VALPROATE SODIUM 250 MG: 100 INJECTION, SOLUTION INTRAVENOUS at 11:09

## 2024-09-08 RX ADMIN — SODIUM CHLORIDE: 9 INJECTION, SOLUTION INTRAVENOUS at 09:09

## 2024-09-08 RX ADMIN — ALPRAZOLAM 2 MG: 0.5 TABLET ORAL at 08:09

## 2024-09-08 RX ADMIN — SODIUM CHLORIDE 1000 ML: 9 INJECTION, SOLUTION INTRAVENOUS at 04:09

## 2024-09-08 RX ADMIN — FAMOTIDINE 20 MG: 10 INJECTION, SOLUTION INTRAVENOUS at 08:09

## 2024-09-08 RX ADMIN — GABAPENTIN 600 MG: 300 CAPSULE ORAL at 03:09

## 2024-09-08 RX ADMIN — MORPHINE SULFATE 2 MG: 2 INJECTION, SOLUTION INTRAMUSCULAR; INTRAVENOUS at 05:09

## 2024-09-08 RX ADMIN — IOHEXOL 100 ML: 350 INJECTION, SOLUTION INTRAVENOUS at 05:09

## 2024-09-08 RX ADMIN — ASPIRIN 81 MG: 81 TABLET ORAL at 11:09

## 2024-09-08 RX ADMIN — GABAPENTIN 600 MG: 300 CAPSULE ORAL at 08:09

## 2024-09-08 RX ADMIN — ONDANSETRON 4 MG: 2 INJECTION INTRAMUSCULAR; INTRAVENOUS at 04:09

## 2024-09-08 RX ADMIN — AMITRIPTYLINE HYDROCHLORIDE 50 MG: 25 TABLET, FILM COATED ORAL at 08:09

## 2024-09-08 RX ADMIN — KETOROLAC TROMETHAMINE 10 MG: 30 INJECTION, SOLUTION INTRAMUSCULAR; INTRAVENOUS at 04:09

## 2024-09-08 RX ADMIN — BICTEGRAVIR SODIUM, EMTRICITABINE, AND TENOFOVIR ALAFENAMIDE FUMARATE 1 TABLET: 50; 200; 25 TABLET ORAL at 11:09

## 2024-09-08 RX ADMIN — LISINOPRIL 5 MG: 5 TABLET ORAL at 11:09

## 2024-09-08 RX ADMIN — FAMOTIDINE 20 MG: 10 INJECTION, SOLUTION INTRAVENOUS at 11:09

## 2024-09-08 NOTE — ASSESSMENT & PLAN NOTE
C/O Nausea and decreased appetite for the past two months. Diarrhea and worsening ab pain distention since Friday.  Started Mounjaro 3 months ago  CT A/P: Dilated fluid-filled small bowel loops with areas of fecalization with questionable transition point in the right hemiabdomen concerning for mechanical small-bowel obstruction. Differential considerations to include ileus versus infectious or noninfectious inflammatory enteritis   -WBC 11, afebrile  -PRN antiemetics and analgesics.  Will try to avoid opioids.  -NPO with ice chips  -General surgery consulted. Appreciate recommendations.

## 2024-09-08 NOTE — ED PROVIDER NOTES
Encounter date: 4:46 AM 09/11/2024    Source of History   Patient/EMS    Chief Complaint   Pt presents with:   Abdominal Pain (Diffuse ABD 8/10  with N/V/D/)      History Of Present Illness   Ang Loya is a 59 y.o. male with hypertension, HIV who presents to the ED with EMS with chief complaint of abdominal pain nausea vomiting and diarrhea.  Patient states he was in normal status of health.  He notes Friday he had some gumbo that he questions if it was bad.  He states after eating that he feels queasy crampy abdominal pain that is diffuse.  He has had multiple bouts of nonbloody nonbilious emesis as well as loose stool.  He denies chest pain, shortness of breath, headache, testicular pain, penile pain, dysuria hematuria.  Denies previous abdominal surgeries.  Story obtained by EMS.  He was given a L of fluids Zofran in his vitals were stable.    This is the extent to the patients complaints today here in the emergency department.  Past History     Review of patient's allergies indicates:   Allergen Reactions    Duloxetine     Lithium analogues        No current facility-administered medications on file prior to encounter.     Current Outpatient Medications on File Prior to Encounter   Medication Sig Dispense Refill    albuterol (PROVENTIL/VENTOLIN HFA) 90 mcg/actuation inhaler 1 puff every 4 (four) hours as needed.      ALPRAZolam (XANAX) 2 MG Tab Take by mouth 2 (two) times daily as needed.      amitriptyline (ELAVIL) 50 MG tablet Take 50 mg by mouth every evening.      aspirin (ECOTRIN) 81 MG EC tablet Take 81 mg by mouth.      BIKTARVY -25 mg (25 kg or greater) Take 1 tablet by mouth.      blood sugar diagnostic Strp 1 strip by Other route.      diclofenac sodium (VOLTAREN) 1 % Gel   0    divalproex ER (DEPAKOTE ER) 250 MG 24 hr tablet Take 250 mg by mouth 2 (two) times daily.      famotidine (PEPCID) 20 MG tablet Take 20 mg by mouth.      fluticasone propionate (FLONASE) 50 mcg/actuation nasal spray    11    gabapentin (NEURONTIN) 800 MG tablet Take 800 mg by mouth 3 (three) times daily.      HIGH POTENCY MULTIVITAMIN 400 mcg Tab Take 1 tablet by mouth.      lisinopriL (PRINIVIL,ZESTRIL) 5 MG tablet Take 5 mg by mouth once daily.      metFORMIN (GLUCOPHAGE) 500 MG tablet metformin 500 mg tablet   Take 1 tablet twice a day by oral route.      neomycin-polymyxin-dexamethasone (MAXITROL) 3.5mg/mL-10,000 unit/mL-0.1 % DrpS Place 1 drop into both eyes 3 (three) times daily.      ondansetron (ZOFRAN) 4 MG tablet Take 1 tablet (4 mg total) by mouth every 8 (eight) hours as needed (Nausea and vomiting). 12 tablet 0    pantoprazole (PROTONIX) 40 MG tablet Please take 1 tablet by mouth every 12 hr when you have abdominal pain, then take 1 tablet every day. 30 tablet 2    TRADJENTA 5 mg Tab tablet Take 5 mg by mouth.      [DISCONTINUED] MOUNJARO 5 mg/0.5 mL PnIj SMARTSI Milligram(s) SUB-Q Once a Week      metoprolol succinate (TOPROL-XL) 25 MG 24 hr tablet Take 25 mg by mouth.      ONETOUCH VERIO Strp   10    VIAGRA 100 mg tablet TK 1 T PO  PRF ED  2       As per HPI and below:  Past Medical History:   Diagnosis Date    Diabetes mellitus     HIV (human immunodeficiency virus infection) 1998    Hypertension      History reviewed. No pertinent surgical history.    Social History     Socioeconomic History    Marital status: Single   Tobacco Use    Smoking status: Every Day     Current packs/day: 1.00     Types: Cigarettes    Smokeless tobacco: Never   Substance and Sexual Activity    Alcohol use: Not Currently     Comment: OCC    Drug use: Not Currently    Sexual activity: Not Currently     Social Determinants of Health     Financial Resource Strain: Low Risk  (9/10/2024)    Overall Financial Resource Strain (CARDIA)     Difficulty of Paying Living Expenses: Not hard at all   Food Insecurity: No Food Insecurity (9/10/2024)    Hunger Vital Sign     Worried About Running Out of Food in the Last Year: Never true     Ran Out of  Food in the Last Year: Never true   Transportation Needs: No Transportation Needs (9/10/2024)    TRANSPORTATION NEEDS     Transportation : No   Physical Activity: Inactive (9/8/2024)    Exercise Vital Sign     Days of Exercise per Week: 0 days     Minutes of Exercise per Session: 0 min   Stress: No Stress Concern Present (9/10/2024)    [employer] - Occupational Stress Questionnaire     Feeling of Stress : Not at all   Housing Stability: Low Risk  (9/10/2024)    Housing Stability Vital Sign     Unable to Pay for Housing in the Last Year: No     Homeless in the Last Year: No       No family history on file.    Physical Exam     Vitals:    09/11/24 0604 09/11/24 0619 09/11/24 0723 09/11/24 0920   BP: (!) 146/77 (!) 146/77  125/72   Pulse: 76 75 71 76   Resp: 14 13  16   Temp: 97.7 °F (36.5 °C) 97.7 °F (36.5 °C)  97.5 °F (36.4 °C)   TempSrc:       SpO2: 96% 96%  98%   Weight:       Height:         Physical Exam:   Nursing note and vitals reviewed.  Appearance:  Well-appearing, nontoxic adult male in no acute respiratory distress.  Making purposeful movements.  Speaking in full sentences.  Skin: No obvious rashes seen.  Good turgor.  No abrasions.  No ecchymoses.  Eyes: No conjunctival injection. EOMI, PERRL.  Head: NC/AT  Chest: CTAB. No increased work of breathing, bilateral chest rise.  Cardiovascular: Regular rate and rhythm.  Normal equal bilateral radial pulses.  Abdomen: Soft.  +distended.  Nontender.  No guarding.  No rebound. No Masses.  No CVA tenderness  Musculoskeletal: No obvious deformities.   Neck supple, full range of motion, no obvious deformity. No tenderness to palpation of cervical through lumbar spine.  No step-offs or deformities. Good range of motion all joints.  Neurologic: Moves all extremities.  Normal sensation.  No facial droop.  Normal speech.    Mental Status:  Alert and oriented x 3.  Appropriate, conversant.      Results and Medications    Procedures    Labs  Reviewed   CBC W/ AUTO DIFFERENTIAL - Abnormal       Result Value    WBC 11.89      RBC 5.61      Hemoglobin 17.8      Hematocrit 51.9      MCV 93      MCH 31.7 (*)     MCHC 34.3      RDW 13.0      Platelets 258      MPV 10.7      Immature Granulocytes 0.3      Gran # (ANC) 9.3 (*)     Immature Grans (Abs) 0.03      Lymph # 1.4      Mono # 1.0      Eos # 0.1      Baso # 0.02      nRBC 0      Gran % 77.8 (*)     Lymph % 11.9 (*)     Mono % 8.7      Eosinophil % 1.1      Basophil % 0.2      Differential Method Automated     COMPREHENSIVE METABOLIC PANEL - Abnormal    Sodium 139      Potassium 5.1      Chloride 107      CO2 17 (*)     Glucose 154 (*)     BUN 22 (*)     Creatinine 1.5 (*)     Calcium 10.1      Total Protein 9.0 (*)     Albumin 4.1      Total Bilirubin 0.6      Alkaline Phosphatase 74      AST 35      ALT 46 (*)     eGFR 54 (*)     Anion Gap 15     LIPASE - Abnormal    Lipase 62 (*)    URINALYSIS, REFLEX TO URINE CULTURE - Abnormal    Specimen UA Urine, Clean Catch      Color, UA Yellow      Appearance, UA Clear      pH, UA 6.0      Specific Gravity, UA >1.030 (*)     Protein, UA 1+ (*)     Glucose, UA Negative      Ketones, UA Negative      Bilirubin (UA) Negative      Occult Blood UA Negative      Nitrite, UA Negative      Urobilinogen, UA Negative      Leukocytes, UA Negative      Narrative:     Specimen Source->Urine   TOXICOLOGY SCREEN, URINE, RANDOM (COMPLIANCE) - Abnormal    Alcohol, Urine <10      Benzodiazepines Presumptive Positive (*)     Methadone metabolites Negative      Cocaine (Metab.) Negative      Opiate Scrn, Ur Presumptive Positive (*)     Barbiturate Screen, Ur Negative      Amphetamine Screen, Ur Negative      THC Presumptive Positive (*)     Phencyclidine Negative      Creatinine, Urine 220.3      Toxicology Information SEE COMMENT      Narrative:     Specimen Source->Urine   URINALYSIS MICROSCOPIC - Abnormal    RBC, UA 2      WBC, UA 1      Bacteria Rare      Squam Epithel, UA 0       Hyaline Casts, UA 4 (*)     Microscopic Comment SEE COMMENT      Narrative:     Specimen Source->Urine   BLUE-TOP TUBE    Extra Blue Top [place] Extra     SARS-COV-2 RDRP GENE    POC Rapid COVID Negative       Acceptable Yes         Imaging Results               CT Abdomen Pelvis With IV Contrast NO Oral Contrast (Final result)  Result time 09/08/24 07:21:06      Final result by Amor Hays MD (09/08/24 07:21:06)                   Impression:      1. Dilated fluid-filled small bowel loops with areas of fecalization with questionable transition point in the right hemiabdomen concerning for mechanical small-bowel obstruction.  Differential considerations to include ileus versus infectious or noninfectious inflammatory enteritis.  2. Additional details of chronic and incidental findings, as provided in the body of report.  This report was flagged in Epic as abnormal.      Electronically signed by: Amor Hays  Date:    09/08/2024  Time:    07:21               Narrative:    EXAMINATION:  CT ABDOMEN PELVIS WITH IV CONTRAST    CLINICAL HISTORY:  Abdominal pain, acute, nonlocalized;    TECHNIQUE:  Low dose axial images, sagittal and coronal reformations were obtained from the lung bases to the pubic symphysis following the IV administration of 100 mL of Omnipaque 350    COMPARISON:  None.    FINDINGS:  Lower chest: Atelectasis and/or scar at the visualized lung bases.    Liver: Unremarkable.    Gallbladder and bile ducts: Unremarkable. No biliary ductal dilatation.    Pancreas: Unremarkable.    Spleen: Unremarkable.    Adrenals: Unremarkable.    Kidneys: Subcentimeter hypodense lesions in the kidneys are too small to definitely characterize.    Lymph nodes: Mildly prominent number, but small mesenteric and retroperitoneal lymph nodes, possibly reactive.    Bowel and mesentery:    Small hiatal hernia. Moderate distention of the stomach with ingested material.  Mild colonic diverticulosis. Colon  appears normal caliber/decompressed.  Normal appearance of the appendix.    Dilated fluid-filled loops of small bowel with areas of fecalization are noted, measuring up to least 41 mm in transverse dimension.  Question transition point in the right mid abdomen (axial series 2, image 81; coronal reformat series 601, image 64)    Abdominal aorta: Nonaneurysmal.  Mild-to-moderate atherosclerosis.    Inferior vena cava: Unremarkable.    Free fluid or free air: None.    Pelvis: Prostate appears mildly enlarged with coarse calcifications.    Body wall: Small fat containing right inguinal hernia.    Bones: No definite acute findings.  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  Relatively modest degenerative findings of the spine.                                          Medications   ketorolac injection 15 mg (has no administration in time range)   sodium chloride 0.9% bolus 1,000 mL 1,000 mL (0 mLs Intravenous Stopped 9/8/24 0609)   ondansetron injection 4 mg (4 mg Intravenous Given 9/8/24 0452)   ketorolac injection 9.999 mg (9.999 mg Intravenous Given 9/8/24 0453)   morphine injection 2 mg (2 mg Intravenous Given 9/8/24 0519)   iohexoL (OMNIPAQUE 350) injection 100 mL (100 mLs Intravenous Given 9/8/24 0549)   morphine injection 2 mg (2 mg Intravenous Given 9/8/24 0542)   magnesium citrate solution 296 mL (296 mLs Oral Given 9/9/24 1157)   potassium, sodium phosphates 280-160-250 mg packet 2 packet (2 packets Oral Given 9/9/24 2200)       MDM, Impression and Plan   Clinical Tests:   Lab Tests: Ordered and Reviewed  Radiological Study: Ordered and Reviewed  Medical Tests: Ordered and Reviewed    Differential diagnosis:  -viral gastroenteritis   -food borne illness   -electrolyte abnormalities  -bowel obstruction   -SURJIT   -unlikely bowel perforation   -unlikely fecal impaction  -ileus    Initial Assessment & ED  Management:    Urgent evaluation of 59 y.o. male with with hypertension, HIV who presents to the ED with EMS with chief complaint of abdominal pain nausea vomiting and diarrhea.  Presents with EMS.  On arrival to the ED he is noted to be afebrile soft blood pressures slightly tachycardic in no acute respiratory distress.  His tachycardia resolved with IV fluids.  His blood pressure improved.  He required multiple doses of IV analgesics including Toradol and morphine.  Given IV Zofran.  Noted to have a low CO2 and an SURJIT which I believe will correct with IV fluids.  Stool cultures obtained.  CT abdomen pelvis shows signs concerning for ileus versus obstruction, waiting final read.  I discussed the case with admitting Dr. Arora who was agreeable with admission to Dr. Villegas with pending final read on CT abdomen pelvis, COVID, urinalysis.  Patient updated on plans for admission.  Patient deemed stable for admission to hospital medicine.    Medical Decision Making  Amount and/or Complexity of Data Reviewed  Labs: ordered. Decision-making details documented in ED Course.  Radiology: ordered.    Risk  Prescription drug management.               I called and discussed the case with:  Hospital Medicine    Please see ED course for discussion of workup and dispo if not listed above.          Final diagnoses:  [K56.609] SBO (small bowel obstruction)  [R10.9] Abdominal pain, unspecified abdominal location (Primary)  [N17.9] SURJIT (acute kidney injury)  [R19.7] Diarrhea, unspecified type  [R11.2] Nausea and vomiting, unspecified vomiting type        ED Disposition Condition    Observation Stable               ED Course as of 09/11/24 2022   Sun Sep 08, 2024   0407 Per EMS, cc of abd pain, gave 4 of IV zofran. Glucose 149.  [HM]   0445 Echo on 06/14/2024 shows  Summary:    1. Estimated left ventricular ejection fraction is 50 to 55%.    2. LV diastolic function is mildly abnormal (Grade I).    3. Right ventricular systolic  function is normal.    [HM]   0535 Creatinine(!): 1.5  [place]   [HM]   0733 Impression:     1. Dilated fluid-filled small bowel loops with areas of fecalization with questionable transition point in the right hemiabdomen concerning for mechanical small-bowel obstruction.  Differential considerations to include ileus versus infectious or noninfectious inflammatory enteritis.  2. Additional details of chronic and incidental findings, as provided in the body of report.  This report was flagged in Epic as abnormal.        Electronically signed by:Amor Hays  Date:                                            09/08/2024  Time:                                           07:21   [BD]      ED Course User Index  [BD] Balwinder Gracia MD  [HM] Martina Rowley MD Heyward Mack, MD Mack, Heyward B, MD  09/08/24 0633       Martina Rowley MD  09/11/24 2022

## 2024-09-08 NOTE — H&P
[facility] Surg [address] Medicine  History & Physical    Patient Name: Ang Loya  MRN: [id number]  Patient Class: OP- Observation  Admission Date: 9/8/2024  Attending Physician: Gladys Villegas MD   Primary Care Provider: Ly, Primary Doctor         Patient information was obtained from patient, past medical records, and ER records.     Subjective:     Principal Problem:Ileus    Chief Complaint:   Chief Complaint   Patient presents with    Abdominal Pain     Diffuse ABD 8/10  with N/V/D          HPI: Mr Fry is a 59 year old male with a PMH that includes DB2, Bipolar, schizophrenia, HIV, and chronic back pain. He came to the ED with abdominal pain, distention, nausea, and diarrhea. He reports that he has been having nausea for the past two months and his PCP unable to control. The diarrhea and vomiting started two days ago. He is passing gas. He also has not had an appetite for the past few months. Of note, he has been taking Mounjaro for the past three months. In the ED, he had a CT A/P that showed dilated fluid-filled small bowel loops with areas of fecalization with questionable transition point in the right hemiabdomen concerning for mechanical small-bowel obstruction. He was admitted to  for further management.     Past Medical History:   Diagnosis Date    Diabetes mellitus     HIV (human immunodeficiency virus infection) 1998    Hypertension        History reviewed. No pertinent surgical history.    Review of patient's allergies indicates:   Allergen Reactions    Duloxetine     Lithium analogues        No current facility-administered medications on file prior to encounter.     Current Outpatient Medications on File Prior to Encounter   Medication Sig    albuterol (PROVENTIL/VENTOLIN HFA) 90 mcg/actuation inhaler 1 puff every 4 (four) hours as needed.    ALPRAZolam (XANAX) 2 MG Tab Take by mouth 2 (two) times daily as needed.    amitriptyline (ELAVIL) 50 MG tablet Take 50 mg by  mouth every evening.    aspirin (ECOTRIN) 81 MG EC tablet Take 81 mg by mouth.    BIKTARVY -25 mg (25 kg or greater) Take 1 tablet by mouth.    blood sugar diagnostic Strp 1 strip by Other route.    diclofenac sodium (VOLTAREN) 1 % Gel     divalproex ER (DEPAKOTE ER) 250 MG 24 hr tablet Take 250 mg by mouth 2 (two) times daily.    famotidine (PEPCID) 20 MG tablet Take 20 mg by mouth.    fluticasone propionate (FLONASE) 50 mcg/actuation nasal spray     gabapentin (NEURONTIN) 800 MG tablet Take 800 mg by mouth 3 (three) times daily.    HIGH POTENCY MULTIVITAMIN 400 mcg Tab Take 1 tablet by mouth.    lisinopriL (PRINIVIL,ZESTRIL) 5 MG tablet Take 5 mg by mouth once daily.    metFORMIN (GLUCOPHAGE) 500 MG tablet metformin 500 mg tablet   Take 1 tablet twice a day by oral route.    MOUNJARO 5 mg/0.5 mL PnIj SMARTSI Milligram(s) SUB-Q Once a Week    neomycin-polymyxin-dexamethasone (MAXITROL) 3.5mg/mL-10,000 unit/mL-0.1 % DrpS Place 1 drop into both eyes 3 (three) times daily.    ondansetron (ZOFRAN) 4 MG tablet Take 1 tablet (4 mg total) by mouth every 8 (eight) hours as needed (Nausea and vomiting).    pantoprazole (PROTONIX) 40 MG tablet Please take 1 tablet by mouth every 12 hr when you have abdominal pain, then take 1 tablet every day.    TRADJENTA 5 mg Tab tablet Take 5 mg by mouth.    [DISCONTINUED] MOUNJARO 2.5 mg/0.5 mL PnIj Inject into the skin every 7 days.    metoprolol succinate (TOPROL-XL) 25 MG 24 hr tablet Take 25 mg by mouth.    ONETOUCH VERIO Strp     VIAGRA 100 mg tablet TK 1 T PO  PRF ED    [DISCONTINUED] buprenorphine-naloxone 8-2 mg (SUBOXONE) 8-2 mg Subl buprenorphine 8 mg-naloxone 2 mg sublingual tablet   Place 1 tablet every day by sublingual route.    [DISCONTINUED] buPROPion (WELLBUTRIN SR) 150 MG TBSR 12 hr tablet     [DISCONTINUED] dolutegravir (TIVICAY) 50 mg Tab Tivicay 50 mg tablet   Take 1 tablet every day by oral route.    [DISCONTINUED] emtricitabine-tenofovir alafen (DESCOVY)  200-25 mg Tab Descovy 200 mg-25 mg tablet   Take 1 tablet every day by oral route.    [DISCONTINUED] gabapentin (NEURONTIN) 300 MG capsule     [DISCONTINUED] ibuprofen (ADVIL,MOTRIN) 600 MG tablet Take 1 tablet (600 mg total) by mouth every 6 (six) hours as needed for Pain.    [DISCONTINUED] ibuprofen (ADVIL,MOTRIN) 800 MG tablet     [DISCONTINUED] metFORMIN (GLUCOPHAGE) 500 MG tablet TK 1 T PO  BID WITH MEALS    [DISCONTINUED] penicillin v potassium (VEETID) 500 MG tablet TK 1 T PO  BID TAT    [DISCONTINUED] TIVICAY 50 mg Tab     [DISCONTINUED] traMADol (ULTRAM) 50 mg tablet tramadol 50 mg tablet   Take 1 tablet every 6 hours by oral route.     Family History    None       Tobacco Use    Smoking status: Every Day     Current packs/day: 1.00     Types: Cigarettes    Smokeless tobacco: Never   Substance and Sexual Activity    Alcohol use: Not Currently     Comment: OCC    Drug use: Not Currently    Sexual activity: Not Currently     Review of Systems   Constitutional:  Positive for appetite change. Negative for activity change, diaphoresis, fatigue and fever.   HENT:  Negative for congestion and trouble swallowing.    Respiratory:  Negative for cough, chest tightness and shortness of breath.    Cardiovascular:  Negative for leg swelling.   Gastrointestinal:  Positive for abdominal distention, abdominal pain, constipation, diarrhea, nausea and vomiting.   Genitourinary:  Negative for difficulty urinating, dysuria and urgency.   Musculoskeletal:  Positive for back pain.   Skin: Negative.    Neurological:  Negative for dizziness, facial asymmetry and headaches.   Psychiatric/Behavioral:  Negative for agitation.      Objective:     Vital Signs (Most Recent):  Temp: 99.1 °F (37.3 °C) (09/08/24 0922)  Pulse: 97 (09/08/24 1009)  Resp: 18 (09/08/24 0922)  BP: 133/77 (09/08/24 0922)  SpO2: (!) 94 % (09/08/24 0922) Vital Signs (24h Range):  Temp:  [97.9 °F (36.6 °C)-99.1 °F (37.3 °C)] 99.1 °F (37.3 °C)  Pulse:  []  97  Resp:  [17-20] 18  SpO2:  [94 %-98 %] 94 %  BP: (110-140)/(58-78) 133/77     Weight: 93.4 kg (206 lb)  Body mass index is 28.73 kg/m².     Physical Exam  Vitals and nursing note reviewed.   HENT:      Head: Normocephalic.      Mouth/Throat:      Mouth: Mucous membranes are moist.   Eyes:      Extraocular Movements: Extraocular movements intact.      Pupils: Pupils are equal, round, and reactive to light.   Cardiovascular:      Rate and Rhythm: Normal rate and regular rhythm.   Pulmonary:      Effort: Pulmonary effort is normal. No respiratory distress.      Breath sounds: No wheezing.   Abdominal:      General: Bowel sounds are normal. There is distension.      Tenderness: There is abdominal tenderness.   Musculoskeletal:         General: No swelling or tenderness. Normal range of motion.      Cervical back: Normal range of motion.   Neurological:      General: No focal deficit present.      Mental Status: He is alert and oriented to person, place, and time.   Psychiatric:         Mood and Affect: Mood normal.         Behavior: Behavior normal.              CRANIAL NERVES     CN III, IV, VI   Pupils are equal, round, and reactive to light.       Significant Labs: All pertinent labs within the past 24 hours have been reviewed.  BMP:   Recent Labs   Lab 09/08/24  0441   *      K 5.1      CO2 17*   BUN 22*   CREATININE 1.5*   CALCIUM 10.1     CBC:   Recent Labs   Lab 09/08/24  0441   WBC 11.89   HGB 17.8   HCT 51.9        CMP:   Recent Labs   Lab 09/08/24  0441      K 5.1      CO2 17*   *   BUN 22*   CREATININE 1.5*   CALCIUM 10.1   PROT 9.0*   ALBUMIN 4.1   BILITOT 0.6   ALKPHOS 74   AST 35   ALT 46*   ANIONGAP 15       Significant Imaging: I have reviewed all pertinent imaging results/findings within the past 24 hours.  CT Abdomen Pelvis With IV Contrast NO Oral Contrast  Narrative: EXAMINATION:  CT ABDOMEN PELVIS WITH IV CONTRAST    CLINICAL HISTORY:  Abdominal pain,  acute, nonlocalized;    TECHNIQUE:  Low dose axial images, sagittal and coronal reformations were obtained from the lung bases to the pubic symphysis following the IV administration of 100 mL of Omnipaque 350    COMPARISON:  None.    FINDINGS:  Lower chest: Atelectasis and/or scar at the visualized lung bases.    Liver: Unremarkable.    Gallbladder and bile ducts: Unremarkable. No biliary ductal dilatation.    Pancreas: Unremarkable.    Spleen: Unremarkable.    Adrenals: Unremarkable.    Kidneys: Subcentimeter hypodense lesions in the kidneys are too small to definitely characterize.    Lymph nodes: Mildly prominent number, but small mesenteric and retroperitoneal lymph nodes, possibly reactive.    Bowel and mesentery:    Small hiatal hernia. Moderate distention of the stomach with ingested material.  Mild colonic diverticulosis. Colon appears normal caliber/decompressed.  Normal appearance of the appendix.    Dilated fluid-filled loops of small bowel with areas of fecalization are noted, measuring up to least 41 mm in transverse dimension.  Question transition point in the right mid abdomen (axial series 2, image 81; coronal reformat series 601, image 64)    Abdominal aorta: Nonaneurysmal.  Mild-to-moderate atherosclerosis.    Inferior vena cava: Unremarkable.    Free fluid or free air: None.    Pelvis: Prostate appears mildly enlarged with coarse calcifications.    Body wall: Small fat containing right inguinal hernia.    Bones: No definite acute findings.  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  Relatively modest degenerative findings of the spine.  Impression: 1. Dilated fluid-filled small bowel loops with areas of fecalization with questionable transition point in the right hemiabdomen concerning for mechanical small-bowel obstruction.  Differential considerations to include ileus versus infectious or noninfectious  inflammatory enteritis.  2. Additional details of chronic and incidental findings, as provided in the body of report.  This report was flagged in Epic as abnormal.    Electronically signed by: Amor Hays  Date:    09/08/2024  Time:    07:21      Assessment/Plan:     * Ileus  C/O Nausea and decreased appetite for the past two months. Diarrhea and worsening ab pain distention since Friday.  Started Mounjaro 3 months ago  CT A/P: Dilated fluid-filled small bowel loops with areas of fecalization with questionable transition point in the right hemiabdomen concerning for mechanical small-bowel obstruction. Differential considerations to include ileus versus infectious or noninfectious inflammatory enteritis   -WBC 11, afebrile  -PRN antiemetics and analgesics.  Will try to avoid opioids.  -NPO with ice chips  -General surgery consulted. Appreciate recommendations.           Chronic mental illness  Bipolar/Schizophrenia  Controlled  Prescribed xanax prn, elavil, and depakote  Will continue in hospital      Tobacco use  Not ready to quit. Cessation discussed. Does not want patch.       Diabetes mellitus  Lab Results   Component Value Date    HGBA1C 6.6 (H) 02/10/2023       Home meds: mounjaro, tradjenta, metformin  IP meds: SSI and can add lantus if needed.   Q6 glucose while NPO    Human immunodeficiency virus (HIV) disease  Continue Biktarvy. Follows with [facility].         VTE Risk Mitigation (From admission, onward)           Ordered     IP VTE LOW RISK PATIENT  Once         09/08/24 0815     Place sequential compression device  Until discontinued         09/08/24 0815                         On 09/08/2024, patient was placed in hospital observation services under my care in collaboration with Dr Villegas.           Shaista Paez, YASMANI  Department of Hospital Medicine  [facility] - Med Surg ([address])

## 2024-09-08 NOTE — SUBJECTIVE & OBJECTIVE
Past Medical History:   Diagnosis Date    Diabetes mellitus     HIV (human immunodeficiency virus infection)     Hypertension        History reviewed. No pertinent surgical history.    Review of patient's allergies indicates:   Allergen Reactions    Duloxetine     Lithium analogues        No current facility-administered medications on file prior to encounter.     Current Outpatient Medications on File Prior to Encounter   Medication Sig    albuterol (PROVENTIL/VENTOLIN HFA) 90 mcg/actuation inhaler 1 puff every 4 (four) hours as needed.    ALPRAZolam (XANAX) 2 MG Tab Take by mouth 2 (two) times daily as needed.    amitriptyline (ELAVIL) 50 MG tablet Take 50 mg by mouth every evening.    aspirin (ECOTRIN) 81 MG EC tablet Take 81 mg by mouth.    BIKTARVY -25 mg (25 kg or greater) Take 1 tablet by mouth.    blood sugar diagnostic Strp 1 strip by Other route.    diclofenac sodium (VOLTAREN) 1 % Gel     divalproex ER (DEPAKOTE ER) 250 MG 24 hr tablet Take 250 mg by mouth 2 (two) times daily.    famotidine (PEPCID) 20 MG tablet Take 20 mg by mouth.    fluticasone propionate (FLONASE) 50 mcg/actuation nasal spray     gabapentin (NEURONTIN) 800 MG tablet Take 800 mg by mouth 3 (three) times daily.    HIGH POTENCY MULTIVITAMIN 400 mcg Tab Take 1 tablet by mouth.    lisinopriL (PRINIVIL,ZESTRIL) 5 MG tablet Take 5 mg by mouth once daily.    metFORMIN (GLUCOPHAGE) 500 MG tablet metformin 500 mg tablet   Take 1 tablet twice a day by oral route.    MOUNJARO 5 mg/0.5 mL PnIj SMARTSI Milligram(s) SUB-Q Once a Week    neomycin-polymyxin-dexamethasone (MAXITROL) 3.5mg/mL-10,000 unit/mL-0.1 % DrpS Place 1 drop into both eyes 3 (three) times daily.    ondansetron (ZOFRAN) 4 MG tablet Take 1 tablet (4 mg total) by mouth every 8 (eight) hours as needed (Nausea and vomiting).    pantoprazole (PROTONIX) 40 MG tablet Please take 1 tablet by mouth every 12 hr when you have abdominal pain, then take 1 tablet every day.     TRADJENTA 5 mg Tab tablet Take 5 mg by mouth.    [DISCONTINUED] MOUNJARO 2.5 mg/0.5 mL PnIj Inject into the skin every 7 days.    metoprolol succinate (TOPROL-XL) 25 MG 24 hr tablet Take 25 mg by mouth.    ONETOUCH VERIO Strp     VIAGRA 100 mg tablet TK 1 T PO  PRF ED    [DISCONTINUED] buprenorphine-naloxone 8-2 mg (SUBOXONE) 8-2 mg Subl buprenorphine 8 mg-naloxone 2 mg sublingual tablet   Place 1 tablet every day by sublingual route.    [DISCONTINUED] buPROPion (WELLBUTRIN SR) 150 MG TBSR 12 hr tablet     [DISCONTINUED] dolutegravir (TIVICAY) 50 mg Tab Tivicay 50 mg tablet   Take 1 tablet every day by oral route.    [DISCONTINUED] emtricitabine-tenofovir alafen (DESCOVY) 200-25 mg Tab Descovy 200 mg-25 mg tablet   Take 1 tablet every day by oral route.    [DISCONTINUED] gabapentin (NEURONTIN) 300 MG capsule     [DISCONTINUED] ibuprofen (ADVIL,MOTRIN) 600 MG tablet Take 1 tablet (600 mg total) by mouth every 6 (six) hours as needed for Pain.    [DISCONTINUED] ibuprofen (ADVIL,MOTRIN) 800 MG tablet     [DISCONTINUED] metFORMIN (GLUCOPHAGE) 500 MG tablet TK 1 T PO  BID WITH MEALS    [DISCONTINUED] penicillin v potassium (VEETID) 500 MG tablet TK 1 T PO  BID TAT    [DISCONTINUED] TIVICAY 50 mg Tab     [DISCONTINUED] traMADol (ULTRAM) 50 mg tablet tramadol 50 mg tablet   Take 1 tablet every 6 hours by oral route.     Family History    None       Tobacco Use    Smoking status: Every Day     Current packs/day: 1.00     Types: Cigarettes    Smokeless tobacco: Never   Substance and Sexual Activity    Alcohol use: Not Currently     Comment: OCC    Drug use: Not Currently    Sexual activity: Not Currently     Review of Systems   Constitutional:  Positive for appetite change. Negative for activity change, diaphoresis, fatigue and fever.   HENT:  Negative for congestion and trouble swallowing.    Respiratory:  Negative for cough, chest tightness and shortness of breath.    Cardiovascular:  Negative for leg swelling.    Gastrointestinal:  Positive for abdominal distention, abdominal pain, constipation, diarrhea, nausea and vomiting.   Genitourinary:  Negative for difficulty urinating, dysuria and urgency.   Musculoskeletal:  Positive for back pain.   Skin: Negative.    Neurological:  Negative for dizziness, facial asymmetry and headaches.   Psychiatric/Behavioral:  Negative for agitation.      Objective:     Vital Signs (Most Recent):  Temp: 99.1 °F (37.3 °C) (09/08/24 0922)  Pulse: 97 (09/08/24 1009)  Resp: 18 (09/08/24 0922)  BP: 133/77 (09/08/24 0922)  SpO2: (!) 94 % (09/08/24 0922) Vital Signs (24h Range):  Temp:  [97.9 °F (36.6 °C)-99.1 °F (37.3 °C)] 99.1 °F (37.3 °C)  Pulse:  [] 97  Resp:  [17-20] 18  SpO2:  [94 %-98 %] 94 %  BP: (110-140)/(58-78) 133/77     Weight: 93.4 kg (206 lb)  Body mass index is 28.73 kg/m².     Physical Exam  Vitals and nursing note reviewed.   HENT:      Head: Normocephalic.      Mouth/Throat:      Mouth: Mucous membranes are moist.   Eyes:      Extraocular Movements: Extraocular movements intact.      Pupils: Pupils are equal, round, and reactive to light.   Cardiovascular:      Rate and Rhythm: Normal rate and regular rhythm.   Pulmonary:      Effort: Pulmonary effort is normal. No respiratory distress.      Breath sounds: No wheezing.   Abdominal:      General: Bowel sounds are normal. There is distension.      Tenderness: There is abdominal tenderness.   Musculoskeletal:         General: No swelling or tenderness. Normal range of motion.      Cervical back: Normal range of motion.   Neurological:      General: No focal deficit present.      Mental Status: He is alert and oriented to person, place, and time.   Psychiatric:         Mood and Affect: Mood normal.         Behavior: Behavior normal.              CRANIAL NERVES     CN III, IV, VI   Pupils are equal, round, and reactive to light.       Significant Labs: All pertinent labs within the past 24 hours have been reviewed.  BMP:    Recent Labs   Lab 09/08/24  0441   *      K 5.1      CO2 17*   BUN 22*   CREATININE 1.5*   CALCIUM 10.1     CBC:   Recent Labs   Lab 09/08/24  0441   WBC 11.89   HGB 17.8   HCT 51.9        CMP:   Recent Labs   Lab 09/08/24  0441      K 5.1      CO2 17*   *   BUN 22*   CREATININE 1.5*   CALCIUM 10.1   PROT 9.0*   ALBUMIN 4.1   BILITOT 0.6   ALKPHOS 74   AST 35   ALT 46*   ANIONGAP 15       Significant Imaging: I have reviewed all pertinent imaging results/findings within the past 24 hours.  CT Abdomen Pelvis With IV Contrast NO Oral Contrast  Narrative: EXAMINATION:  CT ABDOMEN PELVIS WITH IV CONTRAST    CLINICAL HISTORY:  Abdominal pain, acute, nonlocalized;    TECHNIQUE:  Low dose axial images, sagittal and coronal reformations were obtained from the lung bases to the pubic symphysis following the IV administration of 100 mL of Omnipaque 350    COMPARISON:  None.    FINDINGS:  Lower chest: Atelectasis and/or scar at the visualized lung bases.    Liver: Unremarkable.    Gallbladder and bile ducts: Unremarkable. No biliary ductal dilatation.    Pancreas: Unremarkable.    Spleen: Unremarkable.    Adrenals: Unremarkable.    Kidneys: Subcentimeter hypodense lesions in the kidneys are too small to definitely characterize.    Lymph nodes: Mildly prominent number, but small mesenteric and retroperitoneal lymph nodes, possibly reactive.    Bowel and mesentery:    Small hiatal hernia. Moderate distention of the stomach with ingested material.  Mild colonic diverticulosis. Colon appears normal caliber/decompressed.  Normal appearance of the appendix.    Dilated fluid-filled loops of small bowel with areas of fecalization are noted, measuring up to least 41 mm in transverse dimension.  Question transition point in the right mid abdomen (axial series 2, image 81; coronal reformat series 601, image 64)    Abdominal aorta: Nonaneurysmal.  Mild-to-moderate atherosclerosis.    Inferior  vena cava: Unremarkable.    Free fluid or free air: None.    Pelvis: Prostate appears mildly enlarged with coarse calcifications.    Body wall: Small fat containing right inguinal hernia.    Bones: No definite acute findings.  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  Relatively modest degenerative findings of the spine.  Impression: 1. Dilated fluid-filled small bowel loops with areas of fecalization with questionable transition point in the right hemiabdomen concerning for mechanical small-bowel obstruction.  Differential considerations to include ileus versus infectious or noninfectious inflammatory enteritis.  2. Additional details of chronic and incidental findings, as provided in the body of report.  This report was flagged in Epic as abnormal.    Electronically signed by: Amor Hays  Date:    09/08/2024  Time:    07:21

## 2024-09-08 NOTE — PLAN OF CARE
MSW met with the patient at the bedside.     Patient is alert and oriented with no communication barriers.     Patient has no DME in the home.     [facility] P[facility]. [facility] bedside delivery.     Patients' family will transport the patient home at discharge.     CM team will continue to follow.     [facility] ([address])  Initial Discharge Assessment       Primary Care Provider: No, Primary Doctor    Admission Diagnosis: SBO (small bowel obstruction) [K56.609]  SURJIT (acute kidney injury) [N17.9]  Chest pain [R07.9]  Abdominal pain, unspecified abdominal location [R10.9]  Diarrhea, unspecified type [R19.7]  Nausea and vomiting, unspecified vomiting type [R11.2]    Admission Date: 9/8/2024  Expected Discharge Date:     Transition of Care Barriers: (P) None    Payor: MEDICAID / Plan: [place]ICA[place] / Product Type: Government /     Extended Emergency Contact Information  Primary Emergency Contact: Kita Vides  Mobile Phone: [phone]  Relation: Friend  Preferred language: English   needed? No    Discharge Plan A: (P) Home with family  Discharge Plan B: (P) Home Health    No Pharmacies Listed    Initial Assessment (most recent)       Adult Discharge Assessment - 09/08/24 0953          Discharge Assessment    Assessment Type Discharge Planning Assessment (P)      Confirmed/corrected address, phone number and insurance Yes (P)      Confirmed Demographics Correct on Facesheet (P)      Source of Information patient;health record (P)      People in Home alone (P)      Do you expect to return to your current living situation? Yes (P)      Do you have help at home or someone to help you manage your care at home? No (P)      Prior to hospitilization cognitive status: Alert/Oriented (P)      Current cognitive status: Alert/Oriented (P)      Walking or Climbing Stairs Difficulty no (P)      Dressing/Bathing Difficulty no (P)      Equipment Currently Used at  Home none (P)      Readmission within 30 days? No (P)      Patient currently being followed by outpatient case management? No (P)      Do you currently have service(s) that help you manage your care at home? No (P)      Do you take prescription medications? Yes (P)      Do you have prescription coverage? Yes (P)      Do you have any problems affording any of your prescribed medications? No (P)      Is the patient taking medications as prescribed? yes (P)      How do you get to doctors appointments? car, drives self;family or friend will provide (P)      Are you on dialysis? No (P)      Do you take coumadin? No (P)      Discharge Plan A Home with family (P)      Discharge Plan B Home Health (P)      DME Needed Upon Discharge  none (P)      Discharge Plan discussed with: Patient (P)      Transition of Care Barriers None (P)         Physical Activity    On average, how many days per week do you engage in moderate to strenuous exercise (like a brisk walk)? 0 days (P)      On average, how many minutes do you engage in exercise at this level? 0 min (P)         Financial Resource Strain    How hard is it for you to pay for the very basics like food, housing, medical care, and heating? Not hard at all (P)         Housing Stability    In the last 12 months, was there a time when you were not able to pay the mortgage or rent on time? No (P)      At any time in the past 12 months, were you homeless or living in a shelter (including now)? No (P)         Transportation Needs    Has the lack of transportation kept you from medical appointments, meetings, work or from getting things needed for daily living? No (P)         Food Insecurity    Within the past 12 months, you worried that your food would run out before you got the money to buy more. Never true (P)      Within the past 12 months, the food you bought just didn't last and you didn't have money to get more. Never true (P)         Stress    Do you feel stress - tense,  restless, nervous, or anxious, or unable to sleep at night because your mind is troubled all the time - these days? Not at all (P)         Social Isolation    How often do you feel lonely or isolated from those around you?  Never (P)         Alcohol Use    Q1: How often do you have a drink containing alcohol? Never (P)      Q2: How many drinks containing alcohol do you have on a typical day when you are drinking? Patient does not drink (P)      Q3: How often do you have six or more drinks on one occasion? Never (P)         Utilities    In the past 12 months has the electric, gas, oil, or water company threatened to shut off services in your home? No (P)         Health Literacy    How often do you need to have someone help you when you read instructions, pamphlets, or other written material from your doctor or pharmacy? Never (P)

## 2024-09-08 NOTE — ASSESSMENT & PLAN NOTE
Bipolar/Schizophrenia  Controlled  Prescribed xanax prn, elavil, and depakote  Will continue in hospital

## 2024-09-08 NOTE — PLAN OF CARE
Problem: Infection  Goal: Absence of Infection Signs and Symptoms  Outcome: Progressing     Problem: Diabetes Comorbidity  Goal: Blood Glucose Level Within Targeted Range  Outcome: Progressing     Problem: Adult Inpatient Plan of Care  Goal: Readiness for Transition of Care  Outcome: Progressing

## 2024-09-08 NOTE — PROGRESS NOTES
[facility] ([address])  Consult Note      Date of Consultation:9/8/2024    Reason for Consult:  Ileus versus small-bowel obstruction  SUBJECTIVE:     History of Present Illness:  59-year-old male who presented to the emergency room with a 48 hour history of abdominal pain, nausea and vomiting.  This was associated with diarrhea.  Patient is on Mounjaro and has had nausea for the last 2 months.  Patient denies fever, chills, diaphoresis.  No hematuria, dysuria, urgency or frequency.  No previous abdominal surgery.  Past medical history significant for diabetes mellitus, bipolar disorder, HIV, chronic back pain.  CT scan of the abdomen shows dilated loops of small bowel with possible transition    Past Medical History:   Diagnosis Date    Diabetes mellitus     HIV (human immunodeficiency virus infection) 1998    Hypertension      History reviewed. No pertinent surgical history.  No family history on file.  Social History     Tobacco Use    Smoking status: Every Day     Current packs/day: 1.00     Types: Cigarettes    Smokeless tobacco: Never   Substance Use Topics    Alcohol use: Not Currently     Comment: OCC    Drug use: Not Currently       Current Facility-Administered Medications:     0.9%  NaCl infusion, , Intravenous, Continuous, Shaista Paez DNP, Last Rate: 75 mL/hr at 09/08/24 0912, New Bag at 09/08/24 0912    acetaminophen tablet 650 mg, 650 mg, Oral, Q4H PRN, Shaista Paez DNP    albuterol inhaler 1 puff, 1 puff, Inhalation, Q4H PRN, Shaista Paez DNP    ALPRAZolam tablet 2 mg, 2 mg, Oral, BID PRN, Shaista Paez DNP    amitriptyline tablet 50 mg, 50 mg, Oral, QHS, Shaista Paez DNP    aspirin EC tablet 81 mg, 81 mg, Oral, Daily, Shaista Paez DNP, 81 mg at 09/08/24 1135    jyanbllqv-sqiouhqk-ohlbcij ala -25 mg (25 kg or greater) 1 tablet, 1 tablet, Oral, Daily, Shaista Paez DNP, 1 tablet at 09/08/24 1136    dextrose 10% bolus 125 mL 125 mL,  12.5 g, Intravenous, PRN, Shaista Paez DNP    diphenhydrAMINE injection 25 mg, 25 mg, Intravenous, Q6H PRN, Shaista Paez DNP    famotidine (PF) injection 20 mg, 20 mg, Intravenous, BID, Shaista Paez, YASMANI, 20 mg at 09/08/24 1135    gabapentin capsule 600 mg, 600 mg, Oral, TID, Shaista Paez DNP    glucagon (human recombinant) injection 1 mg, 1 mg, Intramuscular, PRN, Shaista Paez DNP    hydrALAZINE injection 10 mg, 10 mg, Intravenous, Q8H PRN, Shaista Paez, YASMANI    insulin aspart U-100 pen 0-5 Units, 0-5 Units, Subcutaneous, Q6H PRN, Shaista Paez DNP    ketorolac injection 15 mg, 15 mg, Intravenous, Q6H PRN, Shaista Paez DNP    lisinopriL tablet 5 mg, 5 mg, Oral, Daily, Shaista Paez DNP, 5 mg at 09/08/24 1135    naloxone 0.4 mg/mL injection 0.02 mg, 0.02 mg, Intravenous, PRN, Shaista Paez DNP    ondansetron injection 4 mg, 4 mg, Intravenous, Q6H PRN, Shaista Paez DNP    promethazine (PHENERGAN) 12.5 mg in 0.9% NaCl 50 mL IVPB, 12.5 mg, Intravenous, Q6H PRN, Shaista Paez DNP    valproate (DEPACON) 250 mg in D5W 50 mL IVPB, 250 mg, Intravenous, Q12H, Shaista Paez, YASMANI, Stopped at 09/08/24 1349     Review of patient's allergies indicates:   Allergen Reactions    Duloxetine     Lithium analogues        Review of Systems:  Review of Systems   Constitutional:  Positive for appetite change. Negative for chills, diaphoresis, fatigue and fever.   HENT: Negative.  Negative for nosebleeds, sore throat and trouble swallowing.    Eyes: Negative.    Respiratory: Negative.  Negative for cough, shortness of breath, wheezing and stridor.    Cardiovascular: Negative.  Negative for chest pain.   Gastrointestinal:  Positive for abdominal pain, diarrhea, nausea and vomiting. Negative for abdominal distention.   Genitourinary: Negative.    Musculoskeletal: Negative.    Skin: Negative.    Neurological: Negative.    Psychiatric/Behavioral:  Negative.          Current Facility-Administered Medications   Medication    0.9%  NaCl infusion    acetaminophen tablet 650 mg    albuterol inhaler 1 puff    ALPRAZolam tablet 2 mg    amitriptyline tablet 50 mg    aspirin EC tablet 81 mg    tpsnoydax-mvkvlfae-iqynwzs ala -25 mg (25 kg or greater) 1 tablet    dextrose 10% bolus 125 mL 125 mL    diphenhydrAMINE injection 25 mg    famotidine (PF) injection 20 mg    gabapentin capsule 600 mg    glucagon (human recombinant) injection 1 mg    hydrALAZINE injection 10 mg    insulin aspart U-100 pen 0-5 Units    ketorolac injection 15 mg    lisinopriL tablet 5 mg    naloxone 0.4 mg/mL injection 0.02 mg    ondansetron injection 4 mg    promethazine (PHENERGAN) 12.5 mg in 0.9% NaCl 50 mL IVPB    valproate (DEPACON) 250 mg in D5W 50 mL IVPB       OBJECTIVE:     Physical Exam:  Physical Exam  Constitutional:       General: He is not in acute distress.     Appearance: He is well-developed.   HENT:      Head: Normocephalic and atraumatic.      Right Ear: External ear normal.      Left Ear: External ear normal.   Eyes:      General: No scleral icterus.     Extraocular Movements: Extraocular movements intact.      Pupils: Pupils are equal, round, and reactive to light.   Cardiovascular:      Rate and Rhythm: Normal rate and regular rhythm.      Heart sounds: Normal heart sounds. No murmur heard.     No friction rub. No gallop.   Pulmonary:      Breath sounds: Normal breath sounds. No wheezing, rhonchi or rales.   Abdominal:      General: Bowel sounds are normal. There is no distension.      Palpations: Abdomen is soft. There is no mass.      Tenderness: There is no abdominal tenderness. There is no guarding or rebound.      Hernia: No hernia is present.   Musculoskeletal:         General: No deformity or signs of injury. Normal range of motion.      Cervical back: Neck supple.      Right lower leg: No edema.      Left lower leg: No edema.   Skin:     General: Skin is warm  "and dry.      Coloration: Skin is not jaundiced.      Findings: No rash.   Neurological:      Mental Status: He is alert and oriented to person, place, and time.      Motor: No weakness.      Coordination: Coordination normal.   Psychiatric:         Mood and Affect: Mood normal.         Behavior: Behavior normal.         Thought Content: Thought content normal.         Judgment: Judgment normal.            Laboratory:  Recent Labs   Lab 09/08/24 0441   WBC 11.89   RBC 5.61   HGB 17.8   HCT 51.9      MCV 93   MCH 31.7*   MCHC 34.3     BMP:   Recent Labs   Lab 09/08/24 0441   *      K 5.1      CO2 17*   BUN 22*   CREATININE 1.5*   CALCIUM 10.1     Lab Results   Component Value Date    CALCIUM 10.1 09/08/2024     BNP  No results for input(s): "BNP", "BNPTRIAGEBLO" in the last 168 hours.No results found for: "URICACID"No results found for: "IRON", "TIBC", "FERRITIN", "SATURATEDIRO"  Lab Results   Component Value Date    CALCIUM 10.1 09/08/2024       Diagnostic Results:  CT Abdomen Pelvis With IV Contrast NO Oral Contrast  Narrative: EXAMINATION:  CT ABDOMEN PELVIS WITH IV CONTRAST    CLINICAL HISTORY:  Abdominal pain, acute, nonlocalized;    TECHNIQUE:  Low dose axial images, sagittal and coronal reformations were obtained from the lung bases to the pubic symphysis following the IV administration of 100 mL of Omnipaque 350    COMPARISON:  None.    FINDINGS:  Lower chest: Atelectasis and/or scar at the visualized lung bases.    Liver: Unremarkable.    Gallbladder and bile ducts: Unremarkable. No biliary ductal dilatation.    Pancreas: Unremarkable.    Spleen: Unremarkable.    Adrenals: Unremarkable.    Kidneys: Subcentimeter hypodense lesions in the kidneys are too small to definitely characterize.    Lymph nodes: Mildly prominent number, but small mesenteric and retroperitoneal lymph nodes, possibly reactive.    Bowel and mesentery:    Small hiatal hernia. Moderate distention of the stomach " with ingested material.  Mild colonic diverticulosis. Colon appears normal caliber/decompressed.  Normal appearance of the appendix.    Dilated fluid-filled loops of small bowel with areas of fecalization are noted, measuring up to least 41 mm in transverse dimension.  Question transition point in the right mid abdomen (axial series 2, image 81; coronal reformat series 601, image 64)    Abdominal aorta: Nonaneurysmal.  Mild-to-moderate atherosclerosis.    Inferior vena cava: Unremarkable.    Free fluid or free air: None.    Pelvis: Prostate appears mildly enlarged with coarse calcifications.    Body wall: Small fat containing right inguinal hernia.    Bones: No definite acute findings.  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  Relatively modest degenerative findings of the spine.  Impression: 1. Dilated fluid-filled small bowel loops with areas of fecalization with questionable transition point in the right hemiabdomen concerning for mechanical small-bowel obstruction.  Differential considerations to include ileus versus infectious or noninfectious inflammatory enteritis.  2. Additional details of chronic and incidental findings, as provided in the body of report.  This report was flagged in Epic as abnormal.    Electronically signed by: Amor Hays  Date:    09/08/2024  Time:    07:21      IMPRESSION:  No evidence of compelling surgical abdomen identifiable at this time.  Plan:  I will follow with serial exam.  Advance diet as tolerated.  Thank you for the opportunity of seeing Ang Loya in consultation

## 2024-09-08 NOTE — HPI
Mr Fry is a 59 year old male with a PMH that includes DB2, Bipolar, schizophrenia, HIV, and chronic back pain. He came to the ED with abdominal pain, distention, nausea, and diarrhea. He reports that he has been having nausea for the past two months and his PCP unable to control. The diarrhea and vomiting started two days ago. He is passing gas. He also has not had an appetite for the past few months. Of note, he has been taking Mounjaro for the past three months. In the ED, he had a CT A/P that showed dilated fluid-filled small bowel loops with areas of fecalization with questionable transition point in the right hemiabdomen concerning for mechanical small-bowel obstruction. He was admitted to  for further management.

## 2024-09-09 PROBLEM — F17.200 TOBACCO DEPENDENCY: Status: ACTIVE | Noted: 2024-09-09

## 2024-09-09 LAB
ANION GAP SERPL CALC-SCNC: 8 MMOL/L (ref 8–16)
BASOPHILS # BLD AUTO: 0.01 K/UL (ref 0–0.2)
BASOPHILS NFR BLD: 0.2 % (ref 0–1.9)
BUN SERPL-MCNC: 21 MG/DL (ref 6–20)
C DIFF GDH STL QL: NEGATIVE
C DIFF TOX A+B STL QL IA: NEGATIVE
CALCIUM SERPL-MCNC: 8.4 MG/DL (ref 8.7–10.5)
CHLORIDE SERPL-SCNC: 110 MMOL/L (ref 95–110)
CO2 SERPL-SCNC: 21 MMOL/L (ref 23–29)
CREAT SERPL-MCNC: 1.1 MG/DL (ref 0.5–1.4)
DIFFERENTIAL METHOD BLD: ABNORMAL
E COLI SXT1 STL QL IA: NEGATIVE
E COLI SXT2 STL QL IA: NEGATIVE
EOSINOPHIL # BLD AUTO: 0.2 K/UL (ref 0–0.5)
EOSINOPHIL NFR BLD: 4 % (ref 0–8)
ERYTHROCYTE [DISTWIDTH] IN BLOOD BY AUTOMATED COUNT: 12.9 % (ref 11.5–14.5)
EST. GFR  (NO RACE VARIABLE): >60 ML/MIN/1.73 M^2
GLUCOSE SERPL-MCNC: 102 MG/DL (ref 70–110)
HCT VFR BLD AUTO: 40.9 % (ref 40–54)
HGB BLD-MCNC: 13.7 G/DL (ref 14–18)
IMM GRANULOCYTES # BLD AUTO: 0.01 K/UL (ref 0–0.04)
IMM GRANULOCYTES NFR BLD AUTO: 0.2 % (ref 0–0.5)
LYMPHOCYTES # BLD AUTO: 1.7 K/UL (ref 1–4.8)
LYMPHOCYTES NFR BLD: 29.3 % (ref 18–48)
MAGNESIUM SERPL-MCNC: 1.8 MG/DL (ref 1.6–2.6)
MCH RBC QN AUTO: 31.4 PG (ref 27–31)
MCHC RBC AUTO-ENTMCNC: 33.5 G/DL (ref 32–36)
MCV RBC AUTO: 94 FL (ref 82–98)
MONOCYTES # BLD AUTO: 0.7 K/UL (ref 0.3–1)
MONOCYTES NFR BLD: 11.5 % (ref 4–15)
NEUTROPHILS # BLD AUTO: 3.1 K/UL (ref 1.8–7.7)
NEUTROPHILS NFR BLD: 54.8 % (ref 38–73)
NRBC BLD-RTO: 0 /100 WBC
PHOSPHATE SERPL-MCNC: 1.9 MG/DL (ref 2.7–4.5)
PLATELET # BLD AUTO: 171 K/UL (ref 150–450)
PMV BLD AUTO: 10.5 FL (ref 9.2–12.9)
POCT GLUCOSE: 90 MG/DL (ref 70–110)
POTASSIUM SERPL-SCNC: 4 MMOL/L (ref 3.5–5.1)
RBC # BLD AUTO: 4.36 M/UL (ref 4.6–6.2)
SODIUM SERPL-SCNC: 139 MMOL/L (ref 136–145)
WBC # BLD AUTO: 5.73 K/UL (ref 3.9–12.7)

## 2024-09-09 PROCEDURE — 84100 ASSAY OF PHOSPHORUS: CPT | Performed by: NURSE PRACTITIONER

## 2024-09-09 PROCEDURE — 85025 COMPLETE CBC W/AUTO DIFF WBC: CPT | Performed by: NURSE PRACTITIONER

## 2024-09-09 PROCEDURE — 25000003 PHARM REV CODE 250: Performed by: NURSE PRACTITIONER

## 2024-09-09 PROCEDURE — 76937 US GUIDE VASCULAR ACCESS: CPT

## 2024-09-09 PROCEDURE — 99231 SBSQ HOSP IP/OBS SF/LOW 25: CPT | Mod: ,,, | Performed by: SPECIALIST

## 2024-09-09 PROCEDURE — 36415 COLL VENOUS BLD VENIPUNCTURE: CPT | Performed by: NURSE PRACTITIONER

## 2024-09-09 PROCEDURE — 21400001 HC TELEMETRY ROOM

## 2024-09-09 PROCEDURE — 80048 BASIC METABOLIC PNL TOTAL CA: CPT | Performed by: NURSE PRACTITIONER

## 2024-09-09 PROCEDURE — 83735 ASSAY OF MAGNESIUM: CPT | Performed by: NURSE PRACTITIONER

## 2024-09-09 RX ORDER — DIVALPROEX SODIUM 250 MG/1
250 TABLET, FILM COATED, EXTENDED RELEASE ORAL EVERY 12 HOURS
Status: DISCONTINUED | OUTPATIENT
Start: 2024-09-09 | End: 2024-09-11 | Stop reason: HOSPADM

## 2024-09-09 RX ORDER — SYRING-NEEDL,DISP,INSUL,0.3 ML 29 G X1/2"
296 SYRINGE, EMPTY DISPOSABLE MISCELLANEOUS ONCE
Status: COMPLETED | OUTPATIENT
Start: 2024-09-09 | End: 2024-09-09

## 2024-09-09 RX ORDER — POLYETHYLENE GLYCOL 3350 17 G/17G
17 POWDER, FOR SOLUTION ORAL 2 TIMES DAILY
Status: DISCONTINUED | OUTPATIENT
Start: 2024-09-09 | End: 2024-09-11 | Stop reason: HOSPADM

## 2024-09-09 RX ORDER — SODIUM,POTASSIUM PHOSPHATES 280-250MG
2 POWDER IN PACKET (EA) ORAL EVERY 4 HOURS
Status: COMPLETED | OUTPATIENT
Start: 2024-09-09 | End: 2024-09-09

## 2024-09-09 RX ORDER — POLYETHYLENE GLYCOL 3350 17 G/17G
17 POWDER, FOR SOLUTION ORAL 2 TIMES DAILY
Status: DISCONTINUED | OUTPATIENT
Start: 2024-09-09 | End: 2024-09-09

## 2024-09-09 RX ADMIN — POTASSIUM & SODIUM PHOSPHATES POWDER PACK 280-160-250 MG 2 PACKET: 280-160-250 PACK at 10:09

## 2024-09-09 RX ADMIN — GABAPENTIN 600 MG: 300 CAPSULE ORAL at 02:09

## 2024-09-09 RX ADMIN — GABAPENTIN 600 MG: 300 CAPSULE ORAL at 08:09

## 2024-09-09 RX ADMIN — AMITRIPTYLINE HYDROCHLORIDE 50 MG: 25 TABLET, FILM COATED ORAL at 08:09

## 2024-09-09 RX ADMIN — POTASSIUM & SODIUM PHOSPHATES POWDER PACK 280-160-250 MG 2 PACKET: 280-160-250 PACK at 02:09

## 2024-09-09 RX ADMIN — BICTEGRAVIR SODIUM, EMTRICITABINE, AND TENOFOVIR ALAFENAMIDE FUMARATE 1 TABLET: 50; 200; 25 TABLET ORAL at 08:09

## 2024-09-09 RX ADMIN — ALPRAZOLAM 2 MG: 0.5 TABLET ORAL at 08:09

## 2024-09-09 RX ADMIN — POLYETHYLENE GLYCOL 3350 17 G: 17 POWDER, FOR SOLUTION ORAL at 08:09

## 2024-09-09 RX ADMIN — FAMOTIDINE 20 MG: 10 INJECTION, SOLUTION INTRAVENOUS at 08:09

## 2024-09-09 RX ADMIN — DIVALPROEX SODIUM 250 MG: 250 TABLET, FILM COATED, EXTENDED RELEASE ORAL at 09:09

## 2024-09-09 RX ADMIN — POTASSIUM & SODIUM PHOSPHATES POWDER PACK 280-160-250 MG 2 PACKET: 280-160-250 PACK at 06:09

## 2024-09-09 RX ADMIN — MAGNESIUM CITRATE 296 ML: 1.75 LIQUID ORAL at 11:09

## 2024-09-09 RX ADMIN — LISINOPRIL 5 MG: 5 TABLET ORAL at 08:09

## 2024-09-09 RX ADMIN — ASPIRIN 81 MG: 81 TABLET ORAL at 08:09

## 2024-09-09 NOTE — HOSPITAL COURSE
Mr Fry is being treated for constipation with possible developing ileus. General surgery does not plan surgical intervention at this time and rec advance diet as tolerated. Patient tolerating clear liquids and denies nausea and vomiting. Advance diet to soft and giving bowel regimen. Had multiple bowel movements. Suspect this was related to mounjaro, discontinued. Stable for dc with GI/PCP fu. Return precautions discussed, no further questions at dc    DC needs F/U PCP and GI (referred)

## 2024-09-09 NOTE — PROGRESS NOTES
[facility] [address] Medicine  Progress Note    Patient Name: Ang Loya  MRN: [id number]  Patient Class: IP- Inpatient   Admission Date: 9/8/2024  Length of Stay: 1 days  Attending Physician: Gladys Villegas MD  Primary Care Provider: Ly, Primary Doctor        Subjective:     Principal Problem:Ileus        HPI:  Mr Fry is a 59 year old male with a PMH that includes DB2, Bipolar, schizophrenia, HIV, and chronic back pain. He came to the ED with abdominal pain, distention, nausea, and diarrhea. He reports that he has been having nausea for the past two months and his PCP unable to control. The diarrhea and vomiting started two days ago. He is passing gas. He also has not had an appetite for the past few months. Of note, he has been taking Mounjaro for the past three months. In the ED, he had a CT A/P that showed dilated fluid-filled small bowel loops with areas of fecalization with questionable transition point in the right hemiabdomen concerning for mechanical small-bowel obstruction. He was admitted to  for further management.     Overview/Hospital Course:  Mr Fry is being treated for constipation with possible developing ileus. General surgery does not plan surgical intervention at this time and rec advance diet as tolerated. Patient tolerating clear liquids and denies nausea and vomiting. Advance diet to soft and give mag citrate to see if he can move his bowels.     DC needs F/U PCP and GI (referred)    Interval History: Mr Fry still has ab distention and constipation. He is tolerating clear liquids with no n/v. He would like to advance diet. Will add laxatives, also. Phosphorus replaced.     Review of Systems   Constitutional:  Positive for appetite change. Negative for activity change, diaphoresis, fatigue and fever.   HENT:  Negative for congestion and trouble swallowing.    Respiratory:  Negative for cough, chest tightness and shortness of breath.    Cardiovascular:   Negative for leg swelling.   Gastrointestinal:  Positive for abdominal distention, abdominal pain and constipation. Negative for diarrhea, nausea and vomiting.   Genitourinary:  Negative for difficulty urinating, dysuria and urgency.   Musculoskeletal:  Positive for back pain.   Skin: Negative.    Neurological:  Negative for dizziness, facial asymmetry and headaches.   Psychiatric/Behavioral:  Negative for agitation.      Objective:     Vital Signs (Most Recent):  Temp: 97.3 °F (36.3 °C) (09/09/24 0725)  Pulse: 71 (09/09/24 0725)  Resp: 15 (09/09/24 0725)  BP: 118/71 (09/09/24 0725)  SpO2: 99 % (09/09/24 0725) Vital Signs (24h Range):  Temp:  [97.3 °F (36.3 °C)-98.6 °F (37 °C)] 97.3 °F (36.3 °C)  Pulse:  [71-98] 71  Resp:  [13-16] 15  SpO2:  [94 %-99 %] 99 %  BP: (118-143)/(71-77) 118/71     Weight: 93.4 kg (206 lb)  Body mass index is 28.73 kg/m².    Intake/Output Summary (Last 24 hours) at 9/9/2024 1018  Last data filed at 9/9/2024 0836  Gross per 24 hour   Intake 640 ml   Output 400 ml   Net 240 ml         Physical Exam  Vitals and nursing note reviewed.   HENT:      Head: Normocephalic.      Mouth/Throat:      Mouth: Mucous membranes are moist.   Eyes:      Extraocular Movements: Extraocular movements intact.      Pupils: Pupils are equal, round, and reactive to light.   Cardiovascular:      Rate and Rhythm: Normal rate and regular rhythm.   Pulmonary:      Effort: Pulmonary effort is normal. No respiratory distress.      Breath sounds: No wheezing.   Abdominal:      General: Bowel sounds are normal. There is distension.      Tenderness: There is abdominal tenderness.   Musculoskeletal:         General: No swelling or tenderness. Normal range of motion.      Cervical back: Normal range of motion.   Neurological:      General: No focal deficit present.      Mental Status: He is alert and oriented to person, place, and time.   Psychiatric:         Mood and Affect: Mood normal.         Behavior: Behavior normal.              Significant Labs: All pertinent labs within the past 24 hours have been reviewed.  BMP:   Recent Labs   Lab 09/09/24  0544         K 4.0      CO2 21*   BUN 21*   CREATININE 1.1   CALCIUM 8.4*   MG 1.8     CBC:   Recent Labs   Lab 09/08/24  0441 09/09/24  0544   WBC 11.89 5.73   HGB 17.8 13.7*   HCT 51.9 40.9    171     CMP:   Recent Labs   Lab 09/08/24  0441 09/09/24  0544    139   K 5.1 4.0    110   CO2 17* 21*   * 102   BUN 22* 21*   CREATININE 1.5* 1.1   CALCIUM 10.1 8.4*   PROT 9.0*  --    ALBUMIN 4.1  --    BILITOT 0.6  --    ALKPHOS 74  --    AST 35  --    ALT 46*  --    ANIONGAP 15 8       Significant Imaging: I have reviewed all pertinent imaging results/findings within the past 24 hours.  CT Abdomen Pelvis With IV Contrast NO Oral Contrast  Narrative: EXAMINATION:  CT ABDOMEN PELVIS WITH IV CONTRAST    CLINICAL HISTORY:  Abdominal pain, acute, nonlocalized;    TECHNIQUE:  Low dose axial images, sagittal and coronal reformations were obtained from the lung bases to the pubic symphysis following the IV administration of 100 mL of Omnipaque 350    COMPARISON:  None.    FINDINGS:  Lower chest: Atelectasis and/or scar at the visualized lung bases.    Liver: Unremarkable.    Gallbladder and bile ducts: Unremarkable. No biliary ductal dilatation.    Pancreas: Unremarkable.    Spleen: Unremarkable.    Adrenals: Unremarkable.    Kidneys: Subcentimeter hypodense lesions in the kidneys are too small to definitely characterize.    Lymph nodes: Mildly prominent number, but small mesenteric and retroperitoneal lymph nodes, possibly reactive.    Bowel and mesentery:    Small hiatal hernia. Moderate distention of the stomach with ingested material.  Mild colonic diverticulosis. Colon appears normal caliber/decompressed.  Normal appearance of the appendix.    Dilated fluid-filled loops of small bowel with areas of fecalization are noted, measuring up to least 41 mm in  transverse dimension.  Question transition point in the right mid abdomen (axial series 2, image 81; coronal reformat series 601, image 64)    Abdominal aorta: Nonaneurysmal.  Mild-to-moderate atherosclerosis.    Inferior vena cava: Unremarkable.    Free fluid or free air: None.    Pelvis: Prostate appears mildly enlarged with coarse calcifications.    Body wall: Small fat containing right inguinal hernia.    Bones: No definite acute findings.  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  Relatively modest degenerative findings of the spine.  Impression: 1. Dilated fluid-filled small bowel loops with areas of fecalization with questionable transition point in the right hemiabdomen concerning for mechanical small-bowel obstruction.  Differential considerations to include ileus versus infectious or noninfectious inflammatory enteritis.  2. Additional details of chronic and incidental findings, as provided in the body of report.  This report was flagged in Epic as abnormal.    Electronically signed by: Amor Hays  Date:    09/08/2024  Time:    07:21        Assessment/Plan:      * Developing SBO vs Ileus  C/O Nausea and decreased appetite for the past two months. Diarrhea and worsening ab pain distention since Friday.  Started Mounjaro 3 months ago  CT A/P: Dilated fluid-filled small bowel loops with areas of fecalization with questionable transition point in the right hemiabdomen concerning for mechanical small-bowel obstruction. Differential considerations to include ileus versus infectious or noninfectious inflammatory enteritis   -WBC 11, afebrile  -PRN antiemetics and analgesics.  Will try to avoid opioids.  -NPO with ice chips  -General surgery consulted. Appreciate recommendations.   9/9: advance diet as tolerated. Mag citrate once and BID miralax started. Phosphorus of 1.9 replaced.         Diabetes mellitus  Lab  Results   Component Value Date    HGBA1C 5.9 (H) 09/08/2024       Home meds: mounjaro, tradjenta, metformin  IP meds: SSI and can add lantus if needed.   Q6 glucose while NPO  9/9: Glucose 102. Continue regimen    Chronic mental illness  Bipolar/Schizophrenia  Controlled  Prescribed xanax prn, elavil, and depakote  Will continue in hospital      Tobacco use  Not ready to quit. Cessation discussed. Does not want patch.       Human immunodeficiency virus (HIV) disease  Continue Biktarvy. Follows with [facility].         VTE Risk Mitigation (From admission, onward)           Ordered     IP VTE LOW RISK PATIENT  Once         09/08/24 0815     Place sequential compression device  Until discontinued         09/08/24 0815                    Discharge Planning   ARMANDO:      Code Status: Full Code   Is the patient medically ready for discharge?:     Reason for patient still in hospital (select all that apply): Patient trending condition and Treatment  Discharge Plan A: Home with family                  Shaista Paez DNP  Department of Hospital Medicine   [employer] [facility] ([address])

## 2024-09-09 NOTE — SUBJECTIVE & OBJECTIVE
Interval History: Mr Fry still has ab distention and constipation. He is tolerating clear liquids with no n/v. He would like to advance diet. Will add laxatives, also.    Review of Systems   Constitutional:  Positive for appetite change. Negative for activity change, diaphoresis, fatigue and fever.   HENT:  Negative for congestion and trouble swallowing.    Respiratory:  Negative for cough, chest tightness and shortness of breath.    Cardiovascular:  Negative for leg swelling.   Gastrointestinal:  Positive for abdominal distention, abdominal pain and constipation. Negative for diarrhea, nausea and vomiting.   Genitourinary:  Negative for difficulty urinating, dysuria and urgency.   Musculoskeletal:  Positive for back pain.   Skin: Negative.    Neurological:  Negative for dizziness, facial asymmetry and headaches.   Psychiatric/Behavioral:  Negative for agitation.      Objective:     Vital Signs (Most Recent):  Temp: 97.3 °F (36.3 °C) (09/09/24 0725)  Pulse: 71 (09/09/24 0725)  Resp: 15 (09/09/24 0725)  BP: 118/71 (09/09/24 0725)  SpO2: 99 % (09/09/24 0725) Vital Signs (24h Range):  Temp:  [97.3 °F (36.3 °C)-98.6 °F (37 °C)] 97.3 °F (36.3 °C)  Pulse:  [71-98] 71  Resp:  [13-16] 15  SpO2:  [94 %-99 %] 99 %  BP: (118-143)/(71-77) 118/71     Weight: 93.4 kg (206 lb)  Body mass index is 28.73 kg/m².    Intake/Output Summary (Last 24 hours) at 9/9/2024 1018  Last data filed at 9/9/2024 0836  Gross per 24 hour   Intake 640 ml   Output 400 ml   Net 240 ml         Physical Exam  Vitals and nursing note reviewed.   HENT:      Head: Normocephalic.      Mouth/Throat:      Mouth: Mucous membranes are moist.   Eyes:      Extraocular Movements: Extraocular movements intact.      Pupils: Pupils are equal, round, and reactive to light.   Cardiovascular:      Rate and Rhythm: Normal rate and regular rhythm.   Pulmonary:      Effort: Pulmonary effort is normal. No respiratory distress.      Breath sounds: No wheezing.   Abdominal:       General: Bowel sounds are normal. There is distension.      Tenderness: There is abdominal tenderness.   Musculoskeletal:         General: No swelling or tenderness. Normal range of motion.      Cervical back: Normal range of motion.   Neurological:      General: No focal deficit present.      Mental Status: He is alert and oriented to person, place, and time.   Psychiatric:         Mood and Affect: Mood normal.         Behavior: Behavior normal.             Significant Labs: All pertinent labs within the past 24 hours have been reviewed.  BMP:   Recent Labs   Lab 09/09/24  0544         K 4.0      CO2 21*   BUN 21*   CREATININE 1.1   CALCIUM 8.4*   MG 1.8     CBC:   Recent Labs   Lab 09/08/24  0441 09/09/24  0544   WBC 11.89 5.73   HGB 17.8 13.7*   HCT 51.9 40.9    171     CMP:   Recent Labs   Lab 09/08/24  0441 09/09/24  0544    139   K 5.1 4.0    110   CO2 17* 21*   * 102   BUN 22* 21*   CREATININE 1.5* 1.1   CALCIUM 10.1 8.4*   PROT 9.0*  --    ALBUMIN 4.1  --    BILITOT 0.6  --    ALKPHOS 74  --    AST 35  --    ALT 46*  --    ANIONGAP 15 8       Significant Imaging: I have reviewed all pertinent imaging results/findings within the past 24 hours.  CT Abdomen Pelvis With IV Contrast NO Oral Contrast  Narrative: EXAMINATION:  CT ABDOMEN PELVIS WITH IV CONTRAST    CLINICAL HISTORY:  Abdominal pain, acute, nonlocalized;    TECHNIQUE:  Low dose axial images, sagittal and coronal reformations were obtained from the lung bases to the pubic symphysis following the IV administration of 100 mL of Omnipaque 350    COMPARISON:  None.    FINDINGS:  Lower chest: Atelectasis and/or scar at the visualized lung bases.    Liver: Unremarkable.    Gallbladder and bile ducts: Unremarkable. No biliary ductal dilatation.    Pancreas: Unremarkable.    Spleen: Unremarkable.    Adrenals: Unremarkable.    Kidneys: Subcentimeter hypodense lesions in the kidneys are too small to definitely  characterize.    Lymph nodes: Mildly prominent number, but small mesenteric and retroperitoneal lymph nodes, possibly reactive.    Bowel and mesentery:    Small hiatal hernia. Moderate distention of the stomach with ingested material.  Mild colonic diverticulosis. Colon appears normal caliber/decompressed.  Normal appearance of the appendix.    Dilated fluid-filled loops of small bowel with areas of fecalization are noted, measuring up to least 41 mm in transverse dimension.  Question transition point in the right mid abdomen (axial series 2, image 81; coronal reformat series 601, image 64)    Abdominal aorta: Nonaneurysmal.  Mild-to-moderate atherosclerosis.    Inferior vena cava: Unremarkable.    Free fluid or free air: None.    Pelvis: Prostate appears mildly enlarged with coarse calcifications.    Body wall: Small fat containing right inguinal hernia.    Bones: No definite acute findings.  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  Relatively modest degenerative findings of the spine.  Impression: 1. Dilated fluid-filled small bowel loops with areas of fecalization with questionable transition point in the right hemiabdomen concerning for mechanical small-bowel obstruction.  Differential considerations to include ileus versus infectious or noninfectious inflammatory enteritis.  2. Additional details of chronic and incidental findings, as provided in the body of report.  This report was flagged in Epic as abnormal.    Electronically signed by: Amor Hays  Date:    09/08/2024  Time:    07:21

## 2024-09-09 NOTE — ASSESSMENT & PLAN NOTE
Lab Results   Component Value Date    HGBA1C 5.9 (H) 09/08/2024       Home meds: mounjaro, tradjenta, metformin  IP meds: SSI and can add lantus if needed.   Q6 glucose while NPO  9/9: Glucose 102. Continue regimen

## 2024-09-09 NOTE — ASSESSMENT & PLAN NOTE
C/O Nausea and decreased appetite for the past two months. Diarrhea and worsening ab pain distention since Friday.  Started Mounjaro 3 months ago  CT A/P: Dilated fluid-filled small bowel loops with areas of fecalization with questionable transition point in the right hemiabdomen concerning for mechanical small-bowel obstruction. Differential considerations to include ileus versus infectious or noninfectious inflammatory enteritis   -WBC 11, afebrile  -PRN antiemetics and analgesics.  Will try to avoid opioids.  -NPO with ice chips  -General surgery consulted. Appreciate recommendations.   9/9: advance diet as tolerated. Mag citrate once and BID miralax started.

## 2024-09-09 NOTE — PROGRESS NOTES
House day 2.  Diagnosis-nausea , vomiting diarrhea, acute.  Chronic nausea and bloating.  Patient with neuropathy and type 2 diabetes on Mounjaro    Subjective   Feeling better   No vomiting   Tolerating solid diet    PE   Afebrile   Vital signs stable   Abdomen-soft, no tenderness, no distention, no guarding    Impression/plan  No evidence of compelling surgical abdomen   Chronic nausea and vomiting, side effect of medications versus/in addition to gastroparesis secondary to neuropathy.

## 2024-09-10 LAB
ALBUMIN SERPL BCP-MCNC: 3.3 G/DL (ref 3.5–5.2)
ALP SERPL-CCNC: 58 U/L (ref 55–135)
ALT SERPL W/O P-5'-P-CCNC: 50 U/L (ref 10–44)
ANION GAP SERPL CALC-SCNC: 7 MMOL/L (ref 8–16)
AST SERPL-CCNC: 42 U/L (ref 10–40)
BACTERIA STL CULT: NORMAL
BILIRUB SERPL-MCNC: 0.4 MG/DL (ref 0.1–1)
BUN SERPL-MCNC: 19 MG/DL (ref 6–20)
CALCIUM SERPL-MCNC: 8.9 MG/DL (ref 8.7–10.5)
CHLORIDE SERPL-SCNC: 106 MMOL/L (ref 95–110)
CO2 SERPL-SCNC: 24 MMOL/L (ref 23–29)
CREAT SERPL-MCNC: 1.2 MG/DL (ref 0.5–1.4)
EST. GFR  (NO RACE VARIABLE): >60 ML/MIN/1.73 M^2
GLUCOSE SERPL-MCNC: 156 MG/DL (ref 70–110)
POCT GLUCOSE: 107 MG/DL (ref 70–110)
POCT GLUCOSE: 140 MG/DL (ref 70–110)
POCT GLUCOSE: 212 MG/DL (ref 70–110)
POCT GLUCOSE: 97 MG/DL (ref 70–110)
POTASSIUM SERPL-SCNC: 3.9 MMOL/L (ref 3.5–5.1)
PROT SERPL-MCNC: 7.1 G/DL (ref 6–8.4)
SODIUM SERPL-SCNC: 137 MMOL/L (ref 136–145)

## 2024-09-10 PROCEDURE — 99231 SBSQ HOSP IP/OBS SF/LOW 25: CPT | Mod: ,,, | Performed by: SPECIALIST

## 2024-09-10 PROCEDURE — 63600175 PHARM REV CODE 636 W HCPCS: Performed by: NURSE PRACTITIONER

## 2024-09-10 PROCEDURE — 80053 COMPREHEN METABOLIC PANEL: CPT | Performed by: PHYSICIAN ASSISTANT

## 2024-09-10 PROCEDURE — 36415 COLL VENOUS BLD VENIPUNCTURE: CPT | Performed by: PHYSICIAN ASSISTANT

## 2024-09-10 PROCEDURE — 21400001 HC TELEMETRY ROOM

## 2024-09-10 PROCEDURE — 25000003 PHARM REV CODE 250: Performed by: NURSE PRACTITIONER

## 2024-09-10 RX ADMIN — AMITRIPTYLINE HYDROCHLORIDE 50 MG: 25 TABLET, FILM COATED ORAL at 07:09

## 2024-09-10 RX ADMIN — POLYETHYLENE GLYCOL 3350 17 G: 17 POWDER, FOR SOLUTION ORAL at 09:09

## 2024-09-10 RX ADMIN — ALPRAZOLAM 2 MG: 0.5 TABLET ORAL at 07:09

## 2024-09-10 RX ADMIN — INSULIN ASPART 2 UNITS: 100 INJECTION, SOLUTION INTRAVENOUS; SUBCUTANEOUS at 09:09

## 2024-09-10 RX ADMIN — ASPIRIN 81 MG: 81 TABLET ORAL at 09:09

## 2024-09-10 RX ADMIN — GABAPENTIN 600 MG: 300 CAPSULE ORAL at 04:09

## 2024-09-10 RX ADMIN — DIVALPROEX SODIUM 250 MG: 250 TABLET, FILM COATED, EXTENDED RELEASE ORAL at 07:09

## 2024-09-10 RX ADMIN — GABAPENTIN 600 MG: 300 CAPSULE ORAL at 07:09

## 2024-09-10 RX ADMIN — GABAPENTIN 600 MG: 300 CAPSULE ORAL at 09:09

## 2024-09-10 RX ADMIN — LISINOPRIL 5 MG: 5 TABLET ORAL at 09:09

## 2024-09-10 RX ADMIN — DIVALPROEX SODIUM 250 MG: 250 TABLET, FILM COATED, EXTENDED RELEASE ORAL at 09:09

## 2024-09-10 RX ADMIN — FAMOTIDINE 20 MG: 10 INJECTION, SOLUTION INTRAVENOUS at 08:09

## 2024-09-10 RX ADMIN — BICTEGRAVIR SODIUM, EMTRICITABINE, AND TENOFOVIR ALAFENAMIDE FUMARATE 1 TABLET: 50; 200; 25 TABLET ORAL at 09:09

## 2024-09-10 RX ADMIN — FAMOTIDINE 20 MG: 10 INJECTION, SOLUTION INTRAVENOUS at 09:09

## 2024-09-10 NOTE — ASSESSMENT & PLAN NOTE
C/O Nausea and decreased appetite for the past two months. Diarrhea and worsening ab pain distention since Friday.  Started Mounjaro 3 months ago  CT A/P: Dilated fluid-filled small bowel loops with areas of fecalization with questionable transition point in the right hemiabdomen concerning for mechanical small-bowel obstruction. Differential considerations to include ileus versus infectious or noninfectious inflammatory enteritis   -WBC 11, afebrile  -PRN antiemetics and analgesics.  Will try to avoid opioids.  -NPO with ice chips  -General surgery consulted. Appreciate recommendations.   9/9: advance diet as tolerated. Mag citrate once and BID miralax started.  Reports having a BM, still not much of an appetite  Encourage PO

## 2024-09-10 NOTE — CONSULTS
Gastroenterology Consult    9/10/2024  11:41 AM    Consulting Physician:  Ernesto Evans MD    Primary Care Provider: No, Primary Doctor    Reason for consultation: Nausea/vomiting    HPI:  Ang Loya is a 59 y.o. male with a PMHx of DM2, HIV, HTN, bipolar/schizophrenia, and chronic back pain who presents to the ED with abdominal pain, distention and new onset vomiting that started two days prior.  In the ED, CT A/P showed dilated fluid filled small bowel loops with areas of fecalization with questionable transition point concerning for mechanical SBO.  General surgery consulted initially with recommendations for conservative management including laxative.  He progressed well after moving his bowels, tolerating oral intake of regular diet today.  Of note, he was started on Mounjaro prior to the onset of these symptoms.      Past Medical History:  Past Medical History:   Diagnosis Date    Diabetes mellitus     HIV (human immunodeficiency virus infection) 1998    Hypertension        Allergies:   Review of patient's allergies indicates:   Allergen Reactions    Duloxetine     Lithium analogues        Current Medications:  Medications Prior to Admission   Medication Sig Dispense Refill Last Dose    albuterol (PROVENTIL/VENTOLIN HFA) 90 mcg/actuation inhaler 1 puff every 4 (four) hours as needed.   Past Week    ALPRAZolam (XANAX) 2 MG Tab Take by mouth 2 (two) times daily as needed.   Past Week    amitriptyline (ELAVIL) 50 MG tablet Take 50 mg by mouth every evening.   Past Week    aspirin (ECOTRIN) 81 MG EC tablet Take 81 mg by mouth.   Past Week    BIKTARVY -25 mg (25 kg or greater) Take 1 tablet by mouth.   Past Week    blood sugar diagnostic Strp 1 strip by Other route.   Past Week    diclofenac sodium (VOLTAREN) 1 % Gel   0 Past Week    divalproex ER (DEPAKOTE ER) 250 MG 24 hr tablet Take 250 mg by mouth 2 (two) times daily.   Past Week    famotidine (PEPCID) 20 MG tablet Take 20 mg by mouth.   Past Week     fluticasone propionate (FLONASE) 50 mcg/actuation nasal spray   11 Past Week    gabapentin (NEURONTIN) 800 MG tablet Take 800 mg by mouth 3 (three) times daily.   Past Week    HIGH POTENCY MULTIVITAMIN 400 mcg Tab Take 1 tablet by mouth.   Past Week    lisinopriL (PRINIVIL,ZESTRIL) 5 MG tablet Take 5 mg by mouth once daily.   Past Week    metFORMIN (GLUCOPHAGE) 500 MG tablet metformin 500 mg tablet   Take 1 tablet twice a day by oral route.   Past Month    MOUNJARO 5 mg/0.5 mL PnIj SMARTSI Milligram(s) SUB-Q Once a Week   Past Week    neomycin-polymyxin-dexamethasone (MAXITROL) 3.5mg/mL-10,000 unit/mL-0.1 % DrpS Place 1 drop into both eyes 3 (three) times daily.   Past Week    ondansetron (ZOFRAN) 4 MG tablet Take 1 tablet (4 mg total) by mouth every 8 (eight) hours as needed (Nausea and vomiting). 12 tablet 0 Past Week    pantoprazole (PROTONIX) 40 MG tablet Please take 1 tablet by mouth every 12 hr when you have abdominal pain, then take 1 tablet every day. 30 tablet 2 Past Week    TRADJENTA 5 mg Tab tablet Take 5 mg by mouth.   Past Week    metoprolol succinate (TOPROL-XL) 25 MG 24 hr tablet Take 25 mg by mouth.   Unknown    ONETOUCH VERIO Strp   10 Unknown    VIAGRA 100 mg tablet TK 1 T PO  PRF ED  2 Unknown         Social History:  Social History     Socioeconomic History    Marital status: Single   Tobacco Use    Smoking status: Every Day     Current packs/day: 1.00     Types: Cigarettes    Smokeless tobacco: Never   Substance and Sexual Activity    Alcohol use: Not Currently     Comment: OCC    Drug use: Not Currently    Sexual activity: Not Currently     Social Determinants of Health     Financial Resource Strain: Low Risk  (9/10/2024)    Overall Financial Resource Strain (CARDIA)     Difficulty of Paying Living Expenses: Not hard at all   Food Insecurity: No Food Insecurity (9/10/2024)    Hunger Vital Sign     Worried About Running Out of Food in the Last Year: Never true     Ran Out of Food in the  Last Year: Never true   Transportation Needs: No Transportation Needs (9/10/2024)    TRANSPORTATION NEEDS     Transportation : No   Physical Activity: Inactive (9/8/2024)    Exercise Vital Sign     Days of Exercise per Week: 0 days     Minutes of Exercise per Session: 0 min   Stress: No Stress Concern Present (9/10/2024)    [employer] - Occupational Stress Questionnaire     Feeling of Stress : Not at all   Housing Stability: Low Risk  (9/10/2024)    Housing Stability Vital Sign     Unable to Pay for Housing in the Last Year: No     Homeless in the Last Year: No       Surgical History:  History reviewed. No pertinent surgical history.      Family History:  No family history on file.    Review of systems:     CONSTITUTIONAL: Negative for fever, chills, weakness, weight loss, weight gain.  HEENT: Negative for blurred vision, hearing loss, nasal congestion, dry mouth, sore throat.  CARDIOVASCULAR: Negative for chest pain or palpitations.  RESPIRATORY: Negative for SOB or cough.  GASTROINTESTINAL: See HPI  GENITOURINARY: Negative for dysuria or hematuria.  MUSCULOSKELETAL: Negative for osteoarthritis or muscle pain.  SKIN: Negative for rashes/lesions.  NEUROLOGIC: Negative for headaches, numbness/tingling.  ENDOCRINE: Negative for diabetes or thyroid abnormalities.  HEMATOLOGIC: Negative for anemia or blood dyscrasias.  Aside from above positives, complete 10 point review of systems negative.    Physical Exam:  Vital Signs (Most Recent):  Temp: 97.5 °F (36.4 °C) (09/10/24 0706)  Pulse: 80 (09/10/24 1103)  Resp: 16 (09/10/24 0706)  BP: 126/74 (09/10/24 0706)  SpO2: 99 % (09/10/24 0706) Vital Signs (24h Range):  Temp:  [96.8 °F (36 °C)-98.1 °F (36.7 °C)] 97.5 °F (36.4 °C)  Pulse:  [64-89] 80  Resp:  [14-17] 16  SpO2:  [98 %-99 %] 99 %  BP: (120-138)/(73-78) 126/74       General: Well developed, well nourished, male in no acute distress.    Eyes:  Anicteric sclera, PERRLA  ENT:  Moist mucous  membranes, no drainage from ears or nose, hearing grossly intact  Lymph:  No cervical, supraclavicular or axillary lymphadenopathy  Neck:  Supple, no nodes or masses felt, no thyromegaly  Cardiovascular:  Regular rate and rhythm without murmur  Lungs:  Clear to auscultation with normal effort; no wheezes or rales noted  GI:  soft, mild distention, normal bowel sounds  Musculoskeletal:  5/5 strength bilaterally  Extremities: No clubbing, cyanosis, or edema, 2+ dorsalis pedis bilaterally  Neurologic:  No focal deficits, alert and oriented x 3  Psych:  Appropriate mood and affect  Skin:  No rash, no pallor, no lesions       Labs:   Latest Reference Range & Units 09/09/24 05:44   WBC 3.90 - 12.70 K/uL 5.73   RBC 4.60 - 6.20 M/uL 4.36 (L)   Hemoglobin 14.0 - 18.0 g/dL 13.7 (L)   Hematocrit 40.0 - 54.0 % 40.9   MCV 82 - 98 fL 94   MCH 27.0 - 31.0 pg 31.4 (H)   MCHC 32.0 - 36.0 g/dL 33.5   RDW 11.5 - 14.5 % 12.9   Platelet Count 150 - 450 K/uL 171   (L): Data is abnormally low  (H): Data is abnormally high   Latest Reference Range & Units 09/10/24 09:43   Sodium 136 - 145 mmol/L 137   Potassium 3.5 - 5.1 mmol/L 3.9   Chloride 95 - 110 mmol/L 106   CO2 23 - 29 mmol/L 24   Anion Gap 8 - 16 mmol/L 7 (L)   BUN 6 - 20 mg/dL 19   Creatinine 0.5 - 1.4 mg/dL 1.2   eGFR >60 mL/min/1.73 m^2 >60   Glucose 70 - 110 mg/dL 156 (H)   Calcium 8.7 - 10.5 mg/dL 8.9   ALP 55 - 135 U/L 58   PROTEIN TOTAL 6.0 - 8.4 g/dL 7.1   Albumin 3.5 - 5.2 g/dL 3.3 (L)   BILIRUBIN TOTAL 0.1 - 1.0 mg/dL 0.4   AST 10 - 40 U/L 42 (H)   ALT 10 - 44 U/L 50 (H)   (L): Data is abnormally low  (H): Data is abnormally high    Imaging and Other Studies:    Personally reviewed    CT ABDOMEN PELVIS WITH IV CONTRAST    Impression:     1. Dilated fluid-filled small bowel loops with areas of fecalization with questionable transition point in the right hemiabdomen concerning for mechanical small-bowel obstruction.  Differential considerations to include ileus versus  infectious or noninfectious inflammatory enteritis.  2. Additional details of chronic and incidental findings, as provided in the body of report.  This report was flagged in Epic as abnormal.        Electronically signed by: Amor Hays  Date:                                            09/08/2024  Time:                                           07:21    Assessment:  Patient is a 59 y.o. male with a PMHx of DM2, HIV, HTN, bipolar/schizophrenia, and chronic back pain who presents to the ED with abdominal pain, distention and new onset vomiting that started two days prior.      Plan:   Agree with diet advancement   Continue with daily Miralax 17 grams   Would hold Mounjaro until seen by PCP as may not tolerate the GI side effects.      Little else to actively offer.  Will be available as needed.      Ernesto Evans

## 2024-09-10 NOTE — PROGRESS NOTES
House day 3.  Diagnosis-nausea, vomiting, diarrhea.  History type 2 diabetes mellitus with peripheral neuropathy and patient on Mounjaro    Subjective   Denies nausea or vomiting  No fever chills  No stool today    PE  Afebrile  Vital signs stable  Abdomen-soft, nontender, no distention, no guarding, no rebound    Impression/plan  No evidence compelling surgical abdomen  History of chronic nausea and vomiting, medications versus gastroparesis

## 2024-09-10 NOTE — SUBJECTIVE & OBJECTIVE
Interval History: Seen at bedside, reports having a BM yesterday. Not much of an appetite, encourage PO intake.    Review of Systems   Constitutional:  Positive for appetite change. Negative for activity change and fever.   Respiratory:  Negative for cough and chest tightness.    Cardiovascular:  Negative for leg swelling.   Gastrointestinal:  Positive for abdominal distention, abdominal pain and constipation. Negative for nausea and vomiting.   Genitourinary:  Negative for difficulty urinating, dysuria and urgency.   Neurological:  Negative for dizziness.     Objective:     Vital Signs (Most Recent):  Temp: 97.5 °F (36.4 °C) (09/10/24 0706)  Pulse: 80 (09/10/24 1103)  Resp: 16 (09/10/24 0706)  BP: 126/74 (09/10/24 0706)  SpO2: 99 % (09/10/24 0706) Vital Signs (24h Range):  Temp:  [96.8 °F (36 °C)-98.1 °F (36.7 °C)] 97.5 °F (36.4 °C)  Pulse:  [64-89] 80  Resp:  [14-17] 16  SpO2:  [98 %-99 %] 99 %  BP: (120-138)/(73-78) 126/74     Weight: 93.4 kg (206 lb)  Body mass index is 28.73 kg/m².    Intake/Output Summary (Last 24 hours) at 9/10/2024 1234  Last data filed at 9/10/2024 0622  Gross per 24 hour   Intake 480 ml   Output --   Net 480 ml         Physical Exam  Vitals and nursing note reviewed.   HENT:      Head: Normocephalic.      Mouth/Throat:      Mouth: Mucous membranes are moist.   Cardiovascular:      Rate and Rhythm: Normal rate and regular rhythm.   Pulmonary:      Effort: Pulmonary effort is normal. No respiratory distress.   Abdominal:      General: Bowel sounds are normal. There is distension.      Palpations: Abdomen is soft.      Tenderness: There is abdominal tenderness (mild).   Musculoskeletal:      Cervical back: Normal range of motion.      Right lower leg: No edema.      Left lower leg: No edema.   Neurological:      General: No focal deficit present.      Mental Status: He is alert.   Psychiatric:         Mood and Affect: Mood normal.         Behavior: Behavior normal.             Significant  Labs: All pertinent labs within the past 24 hours have been reviewed.    Significant Imaging: I have reviewed all pertinent imaging results/findings within the past 24 hours.

## 2024-09-10 NOTE — PLAN OF CARE
Problem: Adult Inpatient Plan of Care  Goal: Plan of Care Review  Outcome: Not Progressing  Goal: Patient-Specific Goal (Individualized)  Outcome: Not Progressing  Goal: Absence of Hospital-Acquired Illness or Injury  Outcome: Not Progressing  Goal: Optimal Comfort and Wellbeing  Outcome: Not Progressing  Goal: Readiness for Transition of Care  Outcome: Not Progressing     Problem: Infection  Goal: Absence of Infection Signs and Symptoms  Outcome: Not Progressing     Problem: Diabetes Comorbidity  Goal: Blood Glucose Level Within Targeted Range  Outcome: Not Progressing

## 2024-09-10 NOTE — PROGRESS NOTES
[facility] S[facility] [address] Medicine  Progress Note    Patient Name: Ang Loya  MRN: [id number]  Patient Class: IP- Inpatient   Admission Date: 9/8/2024  Length of Stay: 2 days  Attending Physician: Angeles Davenport MD  Primary Care Provider: Ly, Primary Doctor        Subjective:     Principal Problem:Ileus        HPI:  Mr Fry is a 59 year old male with a PMH that includes DB2, Bipolar, schizophrenia, HIV, and chronic back pain. He came to the ED with abdominal pain, distention, nausea, and diarrhea. He reports that he has been having nausea for the past two months and his PCP unable to control. The diarrhea and vomiting started two days ago. He is passing gas. He also has not had an appetite for the past few months. Of note, he has been taking Mounjaro for the past three months. In the ED, he had a CT A/P that showed dilated fluid-filled small bowel loops with areas of fecalization with questionable transition point in the right hemiabdomen concerning for mechanical small-bowel obstruction. He was admitted to  for further management.     Overview/Hospital Course:  Mr Fry is being treated for constipation with possible developing ileus. General surgery does not plan surgical intervention at this time and rec advance diet as tolerated. Patient tolerating clear liquids and denies nausea and vomiting. Advance diet to soft and giving bowel regimen.     DC needs F/U PCP and GI (referred)    Interval History: Seen at bedside, reports having a BM yesterday. Not much of an appetite, encourage PO intake.    Review of Systems   Constitutional:  Positive for appetite change. Negative for activity change and fever.   Respiratory:  Negative for cough and chest tightness.    Cardiovascular:  Negative for leg swelling.   Gastrointestinal:  Positive for abdominal distention, abdominal pain and constipation. Negative for nausea and vomiting.   Genitourinary:  Negative for difficulty urinating, dysuria and  urgency.   Neurological:  Negative for dizziness.     Objective:     Vital Signs (Most Recent):  Temp: 97.5 °F (36.4 °C) (09/10/24 0706)  Pulse: 80 (09/10/24 1103)  Resp: 16 (09/10/24 0706)  BP: 126/74 (09/10/24 0706)  SpO2: 99 % (09/10/24 0706) Vital Signs (24h Range):  Temp:  [96.8 °F (36 °C)-98.1 °F (36.7 °C)] 97.5 °F (36.4 °C)  Pulse:  [64-89] 80  Resp:  [14-17] 16  SpO2:  [98 %-99 %] 99 %  BP: (120-138)/(73-78) 126/74     Weight: 93.4 kg (206 lb)  Body mass index is 28.73 kg/m².    Intake/Output Summary (Last 24 hours) at 9/10/2024 1234  Last data filed at 9/10/2024 0622  Gross per 24 hour   Intake 480 ml   Output --   Net 480 ml         Physical Exam  Vitals and nursing note reviewed.   HENT:      Head: Normocephalic.      Mouth/Throat:      Mouth: Mucous membranes are moist.   Cardiovascular:      Rate and Rhythm: Normal rate and regular rhythm.   Pulmonary:      Effort: Pulmonary effort is normal. No respiratory distress.   Abdominal:      General: Bowel sounds are normal. There is distension.      Palpations: Abdomen is soft.      Tenderness: There is abdominal tenderness (mild).   Musculoskeletal:      Cervical back: Normal range of motion.      Right lower leg: No edema.      Left lower leg: No edema.   Neurological:      General: No focal deficit present.      Mental Status: He is alert.   Psychiatric:         Mood and Affect: Mood normal.         Behavior: Behavior normal.             Significant Labs: All pertinent labs within the past 24 hours have been reviewed.    Significant Imaging: I have reviewed all pertinent imaging results/findings within the past 24 hours.    Assessment/Plan:      * Developing SBO vs Ileus  C/O Nausea and decreased appetite for the past two months. Diarrhea and worsening ab pain distention since Friday.  Started Mounjaro 3 months ago  CT A/P: Dilated fluid-filled small bowel loops with areas of fecalization with questionable transition point in the right hemiabdomen  concerning for mechanical small-bowel obstruction. Differential considerations to include ileus versus infectious or noninfectious inflammatory enteritis   -WBC 11, afebrile  -PRN antiemetics and analgesics.  Will try to avoid opioids.  -NPO with ice chips  -General surgery consulted. Appreciate recommendations.   9/9: advance diet as tolerated. Mag citrate once and BID miralax started.  Reports having a BM, still not much of an appetite  Encourage PO    Chronic mental illness  Bipolar/Schizophrenia  Controlled  Prescribed xanax prn, elavil, and depakote  Will continue in hospital      Tobacco use  Not ready to quit. Cessation discussed. Does not want patch.       Diabetes mellitus  Lab Results   Component Value Date    HGBA1C 5.9 (H) 09/08/2024       Home meds: mounjaro, tradjenta, metformin  IP meds: SSI and can add lantus if needed.   Q6 glucose while NPO  9/9: Glucose 102. Continue regimen    Human immunodeficiency virus (HIV) disease  Continue Biktarvy. Follows with [facility].         VTE Risk Mitigation (From admission, onward)           Ordered     IP VTE LOW RISK PATIENT  Once         09/08/24 0815     Place sequential compression device  Until discontinued         09/08/24 0815                    Discharge Planning   ARMANDO: 9/12/2024     Code Status: Full Code   Is the patient medically ready for discharge?:     Reason for patient still in hospital (select all that apply): Patient trending condition  Discharge Plan A: Home with family                  Mar Martines PA-C  Department of Hospital Medicine   [facility] ([address])

## 2024-09-11 VITALS
WEIGHT: 206 LBS | TEMPERATURE: 98 F | SYSTOLIC BLOOD PRESSURE: 125 MMHG | BODY MASS INDEX: 28.84 KG/M2 | HEIGHT: 71 IN | HEART RATE: 76 BPM | OXYGEN SATURATION: 98 % | DIASTOLIC BLOOD PRESSURE: 72 MMHG | RESPIRATION RATE: 16 BRPM

## 2024-09-11 LAB
ALBUMIN SERPL BCP-MCNC: 3.4 G/DL (ref 3.5–5.2)
ALP SERPL-CCNC: 60 U/L (ref 55–135)
ALT SERPL W/O P-5'-P-CCNC: 49 U/L (ref 10–44)
ANION GAP SERPL CALC-SCNC: 9 MMOL/L (ref 8–16)
AST SERPL-CCNC: 32 U/L (ref 10–40)
BILIRUB SERPL-MCNC: 0.3 MG/DL (ref 0.1–1)
BUN SERPL-MCNC: 19 MG/DL (ref 6–20)
CALCIUM SERPL-MCNC: 9.5 MG/DL (ref 8.7–10.5)
CHLORIDE SERPL-SCNC: 107 MMOL/L (ref 95–110)
CO2 SERPL-SCNC: 24 MMOL/L (ref 23–29)
CREAT SERPL-MCNC: 1.4 MG/DL (ref 0.5–1.4)
EST. GFR  (NO RACE VARIABLE): 58 ML/MIN/1.73 M^2
GLUCOSE SERPL-MCNC: 112 MG/DL (ref 70–110)
POCT GLUCOSE: 146 MG/DL (ref 70–110)
POCT GLUCOSE: 90 MG/DL (ref 70–110)
POTASSIUM SERPL-SCNC: 4.3 MMOL/L (ref 3.5–5.1)
PROT SERPL-MCNC: 7.3 G/DL (ref 6–8.4)
SODIUM SERPL-SCNC: 140 MMOL/L (ref 136–145)

## 2024-09-11 PROCEDURE — 25000003 PHARM REV CODE 250: Performed by: NURSE PRACTITIONER

## 2024-09-11 PROCEDURE — 25000003 PHARM REV CODE 250: Performed by: PHYSICIAN ASSISTANT

## 2024-09-11 PROCEDURE — 80053 COMPREHEN METABOLIC PANEL: CPT | Performed by: PHYSICIAN ASSISTANT

## 2024-09-11 PROCEDURE — 36415 COLL VENOUS BLD VENIPUNCTURE: CPT | Performed by: PHYSICIAN ASSISTANT

## 2024-09-11 RX ORDER — METOCLOPRAMIDE HYDROCHLORIDE 5 MG/ML
10 INJECTION INTRAMUSCULAR; INTRAVENOUS ONCE
Status: DISCONTINUED | OUTPATIENT
Start: 2024-09-11 | End: 2024-09-11 | Stop reason: HOSPADM

## 2024-09-11 RX ORDER — DICLOFENAC SODIUM 10 MG/G
2 GEL TOPICAL DAILY
Status: DISCONTINUED | OUTPATIENT
Start: 2024-09-11 | End: 2024-09-11 | Stop reason: HOSPADM

## 2024-09-11 RX ORDER — LIDOCAINE 50 MG/G
2 PATCH TOPICAL
Status: DISCONTINUED | OUTPATIENT
Start: 2024-09-11 | End: 2024-09-11 | Stop reason: HOSPADM

## 2024-09-11 RX ADMIN — ASPIRIN 81 MG: 81 TABLET ORAL at 09:09

## 2024-09-11 RX ADMIN — LISINOPRIL 5 MG: 5 TABLET ORAL at 09:09

## 2024-09-11 RX ADMIN — DIVALPROEX SODIUM 250 MG: 250 TABLET, FILM COATED, EXTENDED RELEASE ORAL at 09:09

## 2024-09-11 RX ADMIN — LIDOCAINE 2 PATCH: 50 PATCH CUTANEOUS at 09:09

## 2024-09-11 RX ADMIN — DICLOFENAC SODIUM 2 G: 10 GEL TOPICAL at 10:09

## 2024-09-11 RX ADMIN — FAMOTIDINE 20 MG: 10 INJECTION, SOLUTION INTRAVENOUS at 09:09

## 2024-09-11 RX ADMIN — BICTEGRAVIR SODIUM, EMTRICITABINE, AND TENOFOVIR ALAFENAMIDE FUMARATE 1 TABLET: 50; 200; 25 TABLET ORAL at 09:09

## 2024-09-11 RX ADMIN — GABAPENTIN 600 MG: 300 CAPSULE ORAL at 09:09
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
## 2024-09-14 NOTE — ASSESSMENT & PLAN NOTE
C/O Nausea and decreased appetite for the past two months. Diarrhea and worsening ab pain distention since Friday.  Started Mounjaro 3 months ago  CT A/P: Dilated fluid-filled small bowel loops with areas of fecalization with questionable transition point in the right hemiabdomen concerning for mechanical small-bowel obstruction. Differential considerations to include ileus versus infectious or noninfectious inflammatory enteritis   -WBC 11, afebrile  -PRN antiemetics and analgesics.  Will try to avoid opioids.  -NPO with ice chips  -General surgery consulted. Appreciate recommendations.   9/9: advance diet as tolerated. Mag citrate once and BID miralax started.  Reports having a BM, still not much of an appetite  Tolerating PO, has had multiple Bms, suspect this presentation related to Mounjaro, discontinued at discharge  Stable for dc with PCP/GI fu; return precautions discussed, no further questions at discharge

## 2024-09-14 NOTE — DISCHARGE SUMMARY
[facility] [address] Medicine  Discharge Summary      Patient Name: Ang Loya  MRN: [id number]  DIXIE: [id number]  Patient Class: IP- Inpatient  Admission Date: 9/8/2024  Hospital Length of Stay: 3 days  Discharge Date and Time: 9/11/2024 11:48 AM  Attending Physician: Ly att. providers found   Discharging Provider: Mar Martines PA-C  Primary Care Provider: Ly, Primary Doctor    Primary Care Team: Networked reference to record PCT     HPI:   Mr Fry is a 59 year old male with a PMH that includes DB2, Bipolar, schizophrenia, HIV, and chronic back pain. He came to the ED with abdominal pain, distention, nausea, and diarrhea. He reports that he has been having nausea for the past two months and his PCP unable to control. The diarrhea and vomiting started two days ago. He is passing gas. He also has not had an appetite for the past few months. Of note, he has been taking Mounjaro for the past three months. In the ED, he had a CT A/P that showed dilated fluid-filled small bowel loops with areas of fecalization with questionable transition point in the right hemiabdomen concerning for mechanical small-bowel obstruction. He was admitted to  for further management.     * No surgery found *      Hospital Course:   Mr Fry is being treated for constipation with possible developing ileus. General surgery does not plan surgical intervention at this time and rec advance diet as tolerated. Patient tolerating clear liquids and denies nausea and vomiting. Advance diet to soft and giving bowel regimen. Had multiple bowel movements. Suspect this was related to mounjaro, discontinued. Stable for dc with GI/PCP fu. Return precautions discussed, no further questions at dc    DC needs F/U PCP and GI (referred)     Goals of Care Treatment Preferences:  Code Status: Full Code      SDOH Screening:  The patient was screened for utility difficulties, food insecurity, transport difficulties, housing insecurity,  and interpersonal safety and there were no concerns identified this admission.     Consults:   Consults (From admission, onward)          Status Ordering Provider     Inpatient consult to Gastroenterology  Once        Provider:  Ernesto Evans MD    Completed FLAQUITO PATEL            GI  * Developing SBO vs Ileus  C/O Nausea and decreased appetite for the past two months. Diarrhea and worsening ab pain distention since Friday.  Started Mounjaro 3 months ago  CT A/P: Dilated fluid-filled small bowel loops with areas of fecalization with questionable transition point in the right hemiabdomen concerning for mechanical small-bowel obstruction. Differential considerations to include ileus versus infectious or noninfectious inflammatory enteritis   -WBC 11, afebrile  -PRN antiemetics and analgesics.  Will try to avoid opioids.  -NPO with ice chips  -General surgery consulted. Appreciate recommendations.   9/9: advance diet as tolerated. Mag citrate once and BID miralax started.  Reports having a BM, still not much of an appetite  Tolerating PO, has had multiple Bms, suspect this presentation related to Mounjaro, discontinued at discharge  Stable for dc with PCP/GI fu; return precautions discussed, no further questions at discharge      Final Active Diagnoses:    Diagnosis Date Noted POA    PRINCIPAL PROBLEM:  Developing SBO vs Ileus [K56.7] 09/08/2024 Yes    Tobacco dependency [F17.200] 09/09/2024 Yes    Chronic mental illness [F99] 09/08/2024 Yes    Diabetes mellitus [E11.9] 03/10/2014 Yes    Tobacco use [Z72.0] 03/10/2014 Yes    Human immunodeficiency virus (HIV) disease [B20] 07/28/2008 Yes      Problems Resolved During this Admission:       Discharged Condition: stable    Disposition: Home or Self Care    Follow Up:   Follow-up Information       Primary Care. Schedule an appointment as soon as possible for a visit in 1 week(s).    Why: to see your primary care for hospital follow up                          Patient Instructions:      Ambulatory referral/consult to Gastroenterology   Standing Status: Future   Referral Priority: Routine Referral Type: Consultation   Referral Reason: Specialty Services Required   Requested Specialty: Gastroenterology   Number of Visits Requested: 1     Ambulatory referral/consult to Smoking Cessation Program   Standing Status: Future   Referral Priority: Routine Referral Type: Consultation   Referral Reason: Specialty Services Required   Requested Specialty: CTTS   Number of Visits Requested: 1     Notify your health care provider if you experience any of the following:  temperature >100.4     Notify your health care provider if you experience any of the following:  severe uncontrolled pain     Notify your health care provider if you experience any of the following:  persistent nausea and vomiting or diarrhea     Notify your health care provider if you experience any of the following:  worsening rash     Notify your health care provider if you experience any of the following:  redness, tenderness, or signs of infection (pain, swelling, redness, odor or green/yellow discharge around incision site)     Reason for not Prescribing Nicotine Replacement     Order Specific Question Answer Comments   Reason for not Prescribing: Not medically appropriate at this time      Activity as tolerated       Significant Diagnostic Studies: Radiology: CT scan: CT ABDOMEN PELVIS WITH CONTRAST:   Results for orders placed or performed during the hospital encounter of 09/08/24   CT Abdomen Pelvis With IV Contrast NO Oral Contrast    Narrative    EXAMINATION:  CT ABDOMEN PELVIS WITH IV CONTRAST    CLINICAL HISTORY:  Abdominal pain, acute, nonlocalized;    TECHNIQUE:  Low dose axial images, sagittal and coronal reformations were obtained from the lung bases to the pubic symphysis following the IV administration of 100 mL of Omnipaque 350    COMPARISON:  None.    FINDINGS:  Lower chest: Atelectasis and/or  scar at the visualized lung bases.    Liver: Unremarkable.    Gallbladder and bile ducts: Unremarkable. No biliary ductal dilatation.    Pancreas: Unremarkable.    Spleen: Unremarkable.    Adrenals: Unremarkable.    Kidneys: Subcentimeter hypodense lesions in the kidneys are too small to definitely characterize.    Lymph nodes: Mildly prominent number, but small mesenteric and retroperitoneal lymph nodes, possibly reactive.    Bowel and mesentery:    Small hiatal hernia. Moderate distention of the stomach with ingested material.  Mild colonic diverticulosis. Colon appears normal caliber/decompressed.  Normal appearance of the appendix.    Dilated fluid-filled loops of small bowel with areas of fecalization are noted, measuring up to least 41 mm in transverse dimension.  Question transition point in the right mid abdomen (axial series 2, image 81; coronal reformat series 601, image 64)    Abdominal aorta: Nonaneurysmal.  Mild-to-moderate atherosclerosis.    Inferior vena cava: Unremarkable.    Free fluid or free air: None.    Pelvis: Prostate appears mildly enlarged with coarse calcifications.    Body wall: Small fat containing right inguinal hernia.    Bones: No definite acute findings.  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  Relatively modest degenerative findings of the spine.      Impression    1. Dilated fluid-filled small bowel loops with areas of fecalization with questionable transition point in the right hemiabdomen concerning for mechanical small-bowel obstruction.  Differential considerations to include ileus versus infectious or noninfectious inflammatory enteritis.  2. Additional details of chronic and incidental findings, as provided in the body of report.  This report was flagged in Epic as abnormal.      Electronically signed by: Amor Hays  Date:    09/08/2024  Time:    07:21       Pending Diagnostic  Studies:       None           Medications:  Reconciled Home Medications:      Medication List        CONTINUE taking these medications      albuterol 90 mcg/actuation inhaler  Commonly known as: PROVENTIL/VENTOLIN HFA  1 puff every 4 (four) hours as needed.     ALPRAZolam 2 MG Tab  Commonly known as: XANAX  Take by mouth 2 (two) times daily as needed.     amitriptyline 50 MG tablet  Commonly known as: ELAVIL  Take 50 mg by mouth every evening.     aspirin 81 MG EC tablet  Commonly known as: ECOTRIN  Take 81 mg by mouth.     BIKTARVY -25 mg (25 kg or greater)  Generic drug: bbbpmnpmg-pbsetskr-kqyxiac ala  Take 1 tablet by mouth.     * blood sugar diagnostic Strp  1 strip by Other route.     * ONETOUCH VERIO TEST STRIPS Strp  Generic drug: blood sugar diagnostic     diclofenac sodium 1 % Gel  Commonly known as: VOLTAREN     divalproex  MG 24 hr tablet  Commonly known as: DEPAKOTE ER  Take 250 mg by mouth 2 (two) times daily.     famotidine 20 MG tablet  Commonly known as: PEPCID  Take 20 mg by mouth.     fluticasone propionate 50 mcg/actuation nasal spray  Commonly known as: FLONASE     gabapentin 800 MG tablet  Commonly known as: NEURONTIN  Take 800 mg by mouth 3 (three) times daily.     HIGH POTENCY MULTIVITAMIN 400 mcg Tab  Generic drug: multivitamin with folic acid  Take 1 tablet by mouth.     lisinopriL 5 MG tablet  Commonly known as: PRINIVIL,ZESTRIL  Take 5 mg by mouth once daily.     metFORMIN 500 MG tablet  Commonly known as: GLUCOPHAGE  metformin 500 mg tablet   Take 1 tablet twice a day by oral route.     metoprolol succinate 25 MG 24 hr tablet  Commonly known as: TOPROL-XL  Take 25 mg by mouth.     neomycin-polymyxin-dexamethasone 3.5mg/mL-10,000 unit/mL-0.1 % Drps  Commonly known as: MAXITROL  Place 1 drop into both eyes 3 (three) times daily.     ondansetron 4 MG tablet  Commonly known as: ZOFRAN  Take 1 tablet (4 mg total) by mouth every 8 (eight) hours as needed (Nausea and vomiting).      pantoprazole 40 MG tablet  Commonly known as: PROTONIX  Please take 1 tablet by mouth every 12 hr when you have abdominal pain, then take 1 tablet every day.     TRADJENTA 5 mg Tab tablet  Generic drug: linaGLIPtin  Take 5 mg by mouth.     VIAGRA 100 mg tablet  Generic drug: sildenafiL  TK 1 T PO  PRF ED           * This list has 2 medication(s) that are the same as other medications prescribed for you. Read the directions carefully, and ask your doctor or other care provider to review them with you.                STOP taking these medications      MOUNJARO 5 mg/0.5 mL Pnij  Generic drug: tirzepatide              Indwelling Lines/Drains at time of discharge:   Lines/Drains/Airways       None                   Time spent on the discharge of patient: >45 minutes         Mar Martines PA-C  Department of Hospital Medicine  [facility] - Med Surg ([address])

## 2024-09-21 NOTE — PHYSICIAN QUERY
Please clarify the patient's HIV diagnosis:  Asymptomatic HIV Infection  HIV Disease or AIDS, please check the applicable support for the diagnosis:History of HIV infection, last CD4 >200

## 2024-12-03 PROBLEM — F19.959 SUBSTANCE-INDUCED PSYCHOTIC DISORDER: Status: ACTIVE | Noted: 2024-12-03

## 2024-12-03 PROBLEM — F19.94 SUBSTANCE INDUCED MOOD DISORDER: Status: ACTIVE | Noted: 2024-12-03

## 2025-04-03 ENCOUNTER — HOSPITAL ENCOUNTER (INPATIENT)
Facility: OTHER | Age: 60
LOS: 2 days | Discharge: HOME | DRG: 389 | End: 2025-04-05
Attending: EMERGENCY MEDICINE | Admitting: HOSPITALIST
Payer: MEDICARE

## 2025-04-03 DIAGNOSIS — B20 HUMAN IMMUNODEFICIENCY VIRUS (HIV) DISEASE: ICD-10-CM

## 2025-04-03 DIAGNOSIS — K56.609 SBO (SMALL BOWEL OBSTRUCTION): Primary | ICD-10-CM

## 2025-04-03 DIAGNOSIS — R10.13 EPIGASTRIC ABDOMINAL PAIN: ICD-10-CM

## 2025-04-03 DIAGNOSIS — R11.2 NAUSEA AND VOMITING, UNSPECIFIED VOMITING TYPE: ICD-10-CM

## 2025-04-03 LAB
ABSOLUTE EOSINOPHIL (OHS): 0.12 K/UL
ABSOLUTE MONOCYTE (OHS): 0.84 K/UL (ref 0.3–1)
ABSOLUTE NEUTROPHIL COUNT (OHS): 7.29 K/UL (ref 1.8–7.7)
ALBUMIN SERPL BCP-MCNC: 3.7 G/DL (ref 3.5–5.2)
ALP SERPL-CCNC: 71 UNIT/L (ref 40–150)
ALT SERPL W/O P-5'-P-CCNC: 47 UNIT/L (ref 10–44)
ANION GAP (OHS): 11 MMOL/L (ref 8–16)
AST SERPL-CCNC: 32 UNIT/L (ref 11–45)
BASOPHILS # BLD AUTO: 0.01 K/UL
BASOPHILS NFR BLD AUTO: 0.1 %
BILIRUB SERPL-MCNC: 0.6 MG/DL (ref 0.1–1)
BUN SERPL-MCNC: 23 MG/DL (ref 6–20)
CALCIUM SERPL-MCNC: 9.5 MG/DL (ref 8.7–10.5)
CHLORIDE SERPL-SCNC: 106 MMOL/L (ref 95–110)
CO2 SERPL-SCNC: 20 MMOL/L (ref 23–29)
CREAT SERPL-MCNC: 1.1 MG/DL (ref 0.5–1.4)
ERYTHROCYTE [DISTWIDTH] IN BLOOD BY AUTOMATED COUNT: 12.4 % (ref 11.5–14.5)
GFR SERPLBLD CREATININE-BSD FMLA CKD-EPI: >60 ML/MIN/1.73/M2
GLUCOSE SERPL-MCNC: 120 MG/DL (ref 70–110)
HCT VFR BLD AUTO: 46.9 % (ref 40–54)
HCV AB SERPL QL IA: NEGATIVE
HGB BLD-MCNC: 15.8 GM/DL (ref 14–18)
HOLD SPECIMEN: NORMAL
IMM GRANULOCYTES # BLD AUTO: 0.03 K/UL (ref 0–0.04)
IMM GRANULOCYTES NFR BLD AUTO: 0.3 % (ref 0–0.5)
LIPASE SERPL-CCNC: 96 U/L (ref 4–60)
LYMPHOCYTES # BLD AUTO: 1.36 K/UL (ref 1–4.8)
MCH RBC QN AUTO: 31.4 PG (ref 27–31)
MCHC RBC AUTO-ENTMCNC: 33.7 G/DL (ref 32–36)
MCV RBC AUTO: 93 FL (ref 82–98)
NUCLEATED RBC (/100WBC) (OHS): 0 /100 WBC
PLATELET # BLD AUTO: 283 K/UL (ref 150–450)
PMV BLD AUTO: 9.9 FL (ref 9.2–12.9)
POCT GLUCOSE: 126 MG/DL (ref 70–110)
POCT GLUCOSE: 97 MG/DL (ref 70–110)
POTASSIUM SERPL-SCNC: 4.4 MMOL/L (ref 3.5–5.1)
PROT SERPL-MCNC: 7.9 GM/DL (ref 6–8.4)
RBC # BLD AUTO: 5.03 M/UL (ref 4.6–6.2)
RELATIVE EOSINOPHIL (OHS): 1.2 %
RELATIVE LYMPHOCYTE (OHS): 14.1 % (ref 18–48)
RELATIVE MONOCYTE (OHS): 8.7 % (ref 4–15)
RELATIVE NEUTROPHIL (OHS): 75.6 % (ref 38–73)
SODIUM SERPL-SCNC: 137 MMOL/L (ref 136–145)
WBC # BLD AUTO: 9.65 K/UL (ref 3.9–12.7)

## 2025-04-03 PROCEDURE — 36415 COLL VENOUS BLD VENIPUNCTURE: CPT | Performed by: NURSE PRACTITIONER

## 2025-04-03 PROCEDURE — 96360 HYDRATION IV INFUSION INIT: CPT

## 2025-04-03 PROCEDURE — 82962 GLUCOSE BLOOD TEST: CPT

## 2025-04-03 PROCEDURE — 99285 EMERGENCY DEPT VISIT HI MDM: CPT | Mod: 25

## 2025-04-03 PROCEDURE — 25000003 PHARM REV CODE 250: Performed by: NURSE PRACTITIONER

## 2025-04-03 PROCEDURE — 11000001 HC ACUTE MED/SURG PRIVATE ROOM

## 2025-04-03 PROCEDURE — 85025 COMPLETE CBC W/AUTO DIFF WBC: CPT | Performed by: NURSE PRACTITIONER

## 2025-04-03 PROCEDURE — 63600175 PHARM REV CODE 636 W HCPCS: Performed by: NURSE PRACTITIONER

## 2025-04-03 PROCEDURE — 93010 ELECTROCARDIOGRAM REPORT: CPT | Mod: ,,, | Performed by: INTERNAL MEDICINE

## 2025-04-03 PROCEDURE — 94761 N-INVAS EAR/PLS OXIMETRY MLT: CPT

## 2025-04-03 PROCEDURE — 25500020 PHARM REV CODE 255: Performed by: NURSE PRACTITIONER

## 2025-04-03 PROCEDURE — 80053 COMPREHEN METABOLIC PANEL: CPT | Performed by: NURSE PRACTITIONER

## 2025-04-03 PROCEDURE — 83036 HEMOGLOBIN GLYCOSYLATED A1C: CPT | Performed by: NURSE PRACTITIONER

## 2025-04-03 PROCEDURE — 83690 ASSAY OF LIPASE: CPT | Performed by: NURSE PRACTITIONER

## 2025-04-03 PROCEDURE — 86803 HEPATITIS C AB TEST: CPT | Performed by: EMERGENCY MEDICINE

## 2025-04-03 PROCEDURE — 93005 ELECTROCARDIOGRAM TRACING: CPT

## 2025-04-03 RX ORDER — TALC
6 POWDER (GRAM) TOPICAL NIGHTLY PRN
Status: DISCONTINUED | OUTPATIENT
Start: 2025-04-03 | End: 2025-04-03

## 2025-04-03 RX ORDER — NALOXONE HCL 0.4 MG/ML
0.02 VIAL (ML) INJECTION
Status: DISCONTINUED | OUTPATIENT
Start: 2025-04-03 | End: 2025-04-05 | Stop reason: HOSPADM

## 2025-04-03 RX ORDER — INSULIN ASPART 100 [IU]/ML
0-5 INJECTION, SOLUTION INTRAVENOUS; SUBCUTANEOUS EVERY 6 HOURS PRN
Status: DISCONTINUED | OUTPATIENT
Start: 2025-04-03 | End: 2025-04-05 | Stop reason: HOSPADM

## 2025-04-03 RX ORDER — SUCRALFATE 1 G/10ML
2 SUSPENSION ORAL
Status: COMPLETED | OUTPATIENT
Start: 2025-04-03 | End: 2025-04-03

## 2025-04-03 RX ORDER — IBUPROFEN 200 MG
24 TABLET ORAL
Status: DISCONTINUED | OUTPATIENT
Start: 2025-04-03 | End: 2025-04-03 | Stop reason: SDUPTHER

## 2025-04-03 RX ORDER — MORPHINE SULFATE 2 MG/ML
2 INJECTION, SOLUTION INTRAMUSCULAR; INTRAVENOUS EVERY 4 HOURS PRN
Refills: 0 | Status: DISCONTINUED | OUTPATIENT
Start: 2025-04-03 | End: 2025-04-05 | Stop reason: HOSPADM

## 2025-04-03 RX ORDER — METOPROLOL SUCCINATE 25 MG/1
25 TABLET, EXTENDED RELEASE ORAL DAILY
Status: DISCONTINUED | OUTPATIENT
Start: 2025-04-04 | End: 2025-04-05 | Stop reason: HOSPADM

## 2025-04-03 RX ORDER — SODIUM CHLORIDE, SODIUM LACTATE, POTASSIUM CHLORIDE, CALCIUM CHLORIDE 600; 310; 30; 20 MG/100ML; MG/100ML; MG/100ML; MG/100ML
INJECTION, SOLUTION INTRAVENOUS CONTINUOUS
Status: DISCONTINUED | OUTPATIENT
Start: 2025-04-03 | End: 2025-04-05

## 2025-04-03 RX ORDER — SODIUM CHLORIDE, SODIUM LACTATE, POTASSIUM CHLORIDE, CALCIUM CHLORIDE 600; 310; 30; 20 MG/100ML; MG/100ML; MG/100ML; MG/100ML
INJECTION, SOLUTION INTRAVENOUS CONTINUOUS
Status: DISCONTINUED | OUTPATIENT
Start: 2025-04-03 | End: 2025-04-03

## 2025-04-03 RX ORDER — ONDANSETRON HYDROCHLORIDE 2 MG/ML
4 INJECTION, SOLUTION INTRAVENOUS EVERY 8 HOURS PRN
Status: DISCONTINUED | OUTPATIENT
Start: 2025-04-03 | End: 2025-04-05 | Stop reason: HOSPADM

## 2025-04-03 RX ORDER — LORAZEPAM 1 MG/1
2 TABLET ORAL EVERY 12 HOURS PRN
Status: DISCONTINUED | OUTPATIENT
Start: 2025-04-03 | End: 2025-04-05 | Stop reason: HOSPADM

## 2025-04-03 RX ORDER — AMITRIPTYLINE HYDROCHLORIDE 25 MG/1
50 TABLET, FILM COATED ORAL NIGHTLY
Status: DISCONTINUED | OUTPATIENT
Start: 2025-04-03 | End: 2025-04-05 | Stop reason: HOSPADM

## 2025-04-03 RX ORDER — IBUPROFEN 200 MG
1 TABLET ORAL DAILY
Status: DISCONTINUED | OUTPATIENT
Start: 2025-04-04 | End: 2025-04-05 | Stop reason: HOSPADM

## 2025-04-03 RX ORDER — SODIUM CHLORIDE 0.9 % (FLUSH) 0.9 %
10 SYRINGE (ML) INJECTION EVERY 8 HOURS PRN
Status: DISCONTINUED | OUTPATIENT
Start: 2025-04-03 | End: 2025-04-03

## 2025-04-03 RX ORDER — GLUCAGON 1 MG
1 KIT INJECTION
Status: DISCONTINUED | OUTPATIENT
Start: 2025-04-03 | End: 2025-04-05 | Stop reason: HOSPADM

## 2025-04-03 RX ORDER — GLUCAGON 1 MG
1 KIT INJECTION
Status: DISCONTINUED | OUTPATIENT
Start: 2025-04-03 | End: 2025-04-03 | Stop reason: SDUPTHER

## 2025-04-03 RX ORDER — IBUPROFEN 200 MG
16 TABLET ORAL
Status: DISCONTINUED | OUTPATIENT
Start: 2025-04-03 | End: 2025-04-03 | Stop reason: SDUPTHER

## 2025-04-03 RX ORDER — GABAPENTIN 400 MG/1
800 CAPSULE ORAL 3 TIMES DAILY
Status: DISCONTINUED | OUTPATIENT
Start: 2025-04-04 | End: 2025-04-05 | Stop reason: HOSPADM

## 2025-04-03 RX ORDER — DIVALPROEX SODIUM 250 MG/1
250 TABLET, FILM COATED, EXTENDED RELEASE ORAL 2 TIMES DAILY
Status: DISCONTINUED | OUTPATIENT
Start: 2025-04-03 | End: 2025-04-05 | Stop reason: HOSPADM

## 2025-04-03 RX ORDER — ENOXAPARIN SODIUM 100 MG/ML
40 INJECTION SUBCUTANEOUS EVERY 24 HOURS
Status: DISCONTINUED | OUTPATIENT
Start: 2025-04-03 | End: 2025-04-05 | Stop reason: HOSPADM

## 2025-04-03 RX ORDER — SODIUM CHLORIDE 0.9 % (FLUSH) 0.9 %
10 SYRINGE (ML) INJECTION
Status: DISCONTINUED | OUTPATIENT
Start: 2025-04-03 | End: 2025-04-05 | Stop reason: HOSPADM

## 2025-04-03 RX ORDER — LISINOPRIL 5 MG/1
5 TABLET ORAL DAILY
Status: DISCONTINUED | OUTPATIENT
Start: 2025-04-04 | End: 2025-04-05 | Stop reason: HOSPADM

## 2025-04-03 RX ADMIN — IOHEXOL 75 ML: 350 INJECTION, SOLUTION INTRAVENOUS at 07:04

## 2025-04-03 RX ADMIN — SODIUM CHLORIDE 1000 ML: 9 INJECTION, SOLUTION INTRAVENOUS at 06:04

## 2025-04-03 RX ADMIN — SUCRALFATE 2 G: 1 SUSPENSION ORAL at 06:04

## 2025-04-03 RX ADMIN — SODIUM CHLORIDE, POTASSIUM CHLORIDE, SODIUM LACTATE AND CALCIUM CHLORIDE: 600; 310; 30; 20 INJECTION, SOLUTION INTRAVENOUS at 10:04

## 2025-04-03 RX ADMIN — MELATONIN TAB 3 MG 6 MG: 3 TAB at 10:04

## 2025-04-03 NOTE — FIRST PROVIDER EVALUATION
Emergency Department TeleTriage Encounter Note      CHIEF COMPLAINT    Chief Complaint   Patient presents with    Abdominal Pain     Sent from [facility] for epigastric pain, diarrhea, nausea and vomiting x 3 days.       VITAL SIGNS   Initial Vitals [04/03/25 1722]   BP Pulse Resp Temp SpO2   123/76 90 18 97.7 °F (36.5 °C) 98 %      MAP       --            ALLERGIES    Review of patient's allergies indicates:   Allergen Reactions    Duloxetine     Lithium analogues        PROVIDER TRIAGE NOTE  Verbal consent for the teletriage evaluation was given by the patient at the start of the evaluation.  All efforts will be made to maintain patient's privacy during the evaluation.      This is a teletriage evaluation of a 59 y.o. male presenting to the ED with c/o diarrhea and epigastric abd pain for 2 days. Limited physical exam via telehealth: The patient is awake, alert, answering questions appropriately and is not in respiratory distress.  As the Teletriage provider, I performed an initial assessment and ordered appropriate labs and imaging studies, if any, to facilitate the patient's care once placed in the ED. Once a room is available, care and a full evaluation will be completed by an alternate ED provider.  Any additional orders and the final disposition will be determined by that provider.  All imaging and labs will not be followed-up by the Teletriage Team, including myself.          ORDERS  Labs Reviewed   CBC W/ AUTO DIFFERENTIAL    Narrative:     The following orders were created for panel order CBC W/ AUTO DIFFERENTIAL.  Procedure                               Abnormality         Status                     ---------                               -----------         ------                     CBC with Differential[[id number]]                                                        Please view results for these tests on the individual orders.   COMPREHENSIVE METABOLIC PANEL   LIPASE   URINALYSIS, REFLEX TO  URINE CULTURE   CBC WITH DIFFERENTIAL       ED Orders (720h ago, onward)      Start Ordered     Status Ordering Provider    04/03/25 1747 04/03/25 1746  Vital signs  Every 2 hours         Ordered KEL ARMENDARIZ.    04/03/25 1747 04/03/25 1746  Diet NPO  Diet effective now         Ordered KEL ARMENDARIZ L.    04/03/25 1747 04/03/25 1746  Insert peripheral IV  Once         Ordered DEEIKEL L.    04/03/25 1747 04/03/25 1746  CBC W/ AUTO DIFFERENTIAL  STAT         Ordered KEL ARMENDARIZ L.    04/03/25 1747 04/03/25 1746  Comp. Metabolic Panel  STAT         Ordered DEEIKEL L.    04/03/25 1747 04/03/25 1746  POCT Venous Blood Gas (Creatinine Only)  Once        Comments: This test should be used for VBGs.  If using this order for other tests (K, creatinine, HCT, PT/INR, lactate etc)  ONLY do so in the case of an emergency or rapid response.Notify Physician if: see parameters below.      Ordered KEL ARMENDARIZ SOM.    04/03/25 1747 04/03/25 1746  Lipase  STAT         Ordered KEL ARMENDARIZ L.    04/03/25 1747 04/03/25 1746  Urinalysis, Reflex to Urine Culture Urine, Clean Catch  STAT         Ordered KEL ARMENDARIZ SOM.    04/03/25 1747 04/03/25 1746  CBC with Differential  PROCEDURE ONCE         Ordered KEL ARMENDARIZ SOM.    Unscheduled 04/03/25 1754  EKG 12-lead  Once         Ordered CATIA GONZALEZ              Virtual Visit Note: The provider triage portion of this emergency department evaluation and documentation was performed via [employer], a HIPAA-compliant telemedicine application, in concert with a tele-presenter in the room. A face to face patient evaluation with one of my colleagues will occur once the patient is placed in an emergency department room.      DISCLAIMER: This note was prepared with [employer] voice recognition transcription software. Garbled syntax, mangled pronouns, and other bizarre constructions may be attributed to that software system.     nausea and diarrhea started this earlier today child alert skin warm amnd dry color good

## 2025-04-03 NOTE — ED TRIAGE NOTES
Pt presents to the ED with c/o abd pain x2 days day. Pt sent over form the [facility]. Pt endorses n/v/d as well. Pt AAOx4, NAD noted.

## 2025-04-03 NOTE — ED PROVIDER NOTES
"Source of History:  Patient, chart    Chief complaint:  Abdominal Pain (Sent from [facility] for epigastric pain, diarrhea, nausea and vomiting x 3 days.)      HPI:  Ang Loya is a 59 y.o. male with medical history of diabetes, HIV, hypertension presenting with nausea, vomiting, diarrhea and intermittent abdominal pain for the past 3 days.  Patient denies taking anything for his symptoms.  Patient states he has not been able to tolerate anything to eat or drink since yesterday.    This is the extent to the patients complaints today here in the emergency department.    ROS: As per HPI and below:  Constitutional: No fever.  No chills.  Eyes: No visual changes.  ENT: No sore throat. No ear pain    Cardiovascular: No chest pain.  Respiratory: No shortness of breath.  GI:  Positive for abdominal pain.  Positive for nausea.  Positive for vomiting.  Positive for diarrhea  Genitourinary: No abnormal urination.  Neurologic: No headache. No focal weakness.  No numbness.  MSK: no back pain.  Integument: No rashes or lesions.  Hematologic: No easy bruising.  Endocrine: No excessive thirst or urination.    Review of patient's allergies indicates:   Allergen Reactions    Duloxetine     Lithium analogues        PMH:  As per HPI and below:  Past Medical History:   Diagnosis Date    Diabetes mellitus     HIV (human immunodeficiency virus infection) 1998    Hypertension      No past surgical history on file.    Social History[1]    Physical Exam:    /76   Pulse 90   Temp 97.7 °F (36.5 °C) (Oral)   Resp 18   Ht 5' 11" (1.803 m)   Wt 81.6 kg (180 lb)   SpO2 98%   BMI 25.10 kg/m²   Nursing note and vital signs reviewed.  Constitutional: No acute distress.  Nontoxic  Eyes: No conjunctival injection.  Extraocular muscles are intact.  ENT: Oropharynx clear.  Normal phonation.  Mucous membranes pink and moist  Cardiovascular: Regular rate and rhythm.  No murmurs. No gallops. No rubs  Respiratory: Clear to auscultation " bilaterally.  Good air movement.  No wheezes.  No rhonchi. No rales. No accessory muscle use.  Abdomen:  Abdomen is soft with generalized tenderness to palpation to epigastric region.  Hyperactive bowel sounds.  Non peritoneal.  No guarding no rebound.  Musculoskeletal: Good range of motion all joints.  No deformities.  Neck supple.  No meningismus.  Skin: No rashes seen.  Good turgor.  No abrasions.  No ecchymoses.  Neuro: alert and oriented x3,  no focal neurological deficits.  Psych: Appropriate, conversant    MDM    Emergent evaluation of a 58 yo male presenting for generalized abdominal pain with nausea, vomiting and diarrhea for the past 3 days.  Patient states he has not been able to eat or drink for the past day.  On exam pt is A&Ox3. VSS. Nonfebrile and nontoxic appearing.  Mucous membranes pink and moist. Tonsils with no redness, erythema or exudates. Breath sounds clear bilaterally.  Abdomen is soft with generalized tenderness to palpation to epigastric region.  Hyperactive bowel sounds.  Non peritoneal.  No guarding no rebound.  Pt speaking in full sentences.  Steady gait appreciated. Cap refill < 3 seconds.      History Acquisition   Additional history was acquired from other historians.  Chart    The patient's list of active medical problems, social history, medications, and allergies as documented per RN staff has been reviewed.     Differential Diagnoses   Based on available information and the initial assessment, the working differential diagnoses considered during this evaluation include but are not limited to gastritis, gastroenteritis, GERD, PUD, DKA, hyperglycemia, others.    I will get labs, hydrate, medicate and reassess.      LABS     Labs Reviewed   COMPREHENSIVE METABOLIC PANEL - Abnormal       Result Value    Sodium 137      Potassium 4.4      Chloride 106      CO2 20 (*)     Glucose 120 (*)     BUN 23 (*)     Creatinine 1.1      Calcium 9.5      Protein Total 7.9      Albumin 3.7       Bilirubin Total 0.6      ALP 71      AST 32      ALT 47 (*)     Anion Gap 11      eGFR >60     LIPASE - Abnormal    Lipase Level 96 (*)    CBC WITH DIFFERENTIAL - Abnormal    WBC 9.65      RBC 5.03      HGB 15.8      HCT 46.9      MCV 93      MCH 31.4 (*)     MCHC 33.7      RDW 12.4      Platelet Count 283      MPV 9.9      Nucleated RBC 0      Neut % 75.6 (*)     Lymph % 14.1 (*)     Mono % 8.7      Eos % 1.2      Basophil % 0.1      Imm Grans % 0.3      Neut # 7.29      Lymph # 1.36      Mono # 0.84      Eos # 0.12      Baso # 0.01      Imm Grans # 0.03     POCT GLUCOSE - Abnormal    POCT Glucose 126 (*)    HEPATITIS C ANTIBODY - Normal    Hep C Ab Interp Negative     CBC W/ AUTO DIFFERENTIAL    Narrative:     The following orders were created for panel order CBC W/ AUTO DIFFERENTIAL.  Procedure                               Abnormality         Status                     ---------                               -----------         ------                     CBC with Differential[[id number]]       Abnormal            Final result                 Please view results for these tests on the individual orders.   EXTRA TUBES    Narrative:     The following orders were created for panel order EXTRA TUBES.  Procedure                               Abnormality         Status                     ---------                               -----------         ------                     Gold Top Hold[[id number]]                                   Final result                 Please view results for these tests on the individual orders.   GOLD TOP HOLD    Extra Tube extra     POCT GLUCOSE MONITORING CONTINUOUS     EKG   EKG Readings: (Independently Interpreted)   Initial Reading: No STEMI. Previous EKG: Compared with most recent EKG Previous EKG Date: 1/23/20. Rhythm: Sinus Tachycardia. Heart Rate: 106.   My independent interpreted    No STEMI  Sinus tachycardia  Rate of 106       Additional Consideration   All available testing was  considered during the course of this workup.  Comorbidities taken into consideration during the patient's evaluation and treatment include weight, age.    Social determinants of health were taken into consideration during development of our treatment plan.    Medications   melatonin tablet 6 mg (has no administration in time range)   sucralfate 100 mg/mL suspension 2 g (2 g Oral Given 4/3/25 1847)   sodium chloride 0.9% bolus 1,000 mL 1,000 mL (0 mLs Intravenous Stopped 4/3/25 1930)   iohexoL (OMNIPAQUE 350) injection 75 mL (75 mLs Intravenous Given 4/3/25 1955)      ED Course as of 04/03/25 2041   Thu Apr 03, 2025 2013 CBC unremarkable.  No leukocytosis noted.  H&H stable at 15.8 and 46.9.  CMP unremarkable.  Lipase slightly elevated but at baseline of 96.  Awaiting CT for disposition [RZ]   2034 CT independently reviewed by myself and radiology.  CT shows mild fluid distention of mid small bowel loops with air-fluid levels which could reflect at least partial developing small bowel obstruction.  Appearance of clear transition point is seen in the left mid abdomen with decompressed small bowel loop seen distal from this region.  [RZ]   2040 Discussed case with hospital medicine.  Will admit for further evaluation treatment.  Awaiting bed assignment [RZ]      ED Course User Index  [RZ] Di Cortes NP             CLINICAL IMPRESSION  1. SBO (small bowel obstruction)    2. Epigastric abdominal pain    3. Nausea and vomiting, unspecified vomiting type         ED Disposition Condition    Admit Stable          This note was created using dictation software.  This program may occasionally mistype words and phrases.           [1]   Social History  Tobacco Use    Smoking status: Every Day     Current packs/day: 1.00     Types: Cigarettes    Smokeless tobacco: Never   Substance Use Topics    Alcohol use: Not Currently     Comment: OCC    Drug use: Not Currently        Di Cortes NP  04/03/25 2042

## 2025-04-04 PROBLEM — I10 HYPERTENSION: Status: ACTIVE | Noted: 2025-04-04

## 2025-04-04 PROBLEM — F31.9 BIPOLAR DISORDER: Status: ACTIVE | Noted: 2025-04-04

## 2025-04-04 PROBLEM — F14.90 COCAINE USE: Status: ACTIVE | Noted: 2025-04-04

## 2025-04-04 LAB
ABSOLUTE EOSINOPHIL (OHS): 0.18 K/UL
ABSOLUTE MONOCYTE (OHS): 0.7 K/UL (ref 0.3–1)
ABSOLUTE NEUTROPHIL COUNT (OHS): 3.09 K/UL (ref 1.8–7.7)
ALBUMIN SERPL BCP-MCNC: 3.1 G/DL (ref 3.5–5.2)
ALP SERPL-CCNC: 58 UNIT/L (ref 40–150)
ALT SERPL W/O P-5'-P-CCNC: 37 UNIT/L (ref 10–44)
ANION GAP (OHS): 7 MMOL/L (ref 8–16)
AST SERPL-CCNC: 26 UNIT/L (ref 11–45)
BASOPHILS # BLD AUTO: 0.01 K/UL
BASOPHILS NFR BLD AUTO: 0.2 %
BILIRUB SERPL-MCNC: 0.6 MG/DL (ref 0.1–1)
BUN SERPL-MCNC: 17 MG/DL (ref 6–20)
CALCIUM SERPL-MCNC: 8.8 MG/DL (ref 8.7–10.5)
CHLORIDE SERPL-SCNC: 105 MMOL/L (ref 95–110)
CO2 SERPL-SCNC: 24 MMOL/L (ref 23–29)
CREAT SERPL-MCNC: 0.9 MG/DL (ref 0.5–1.4)
EAG (OHS): 120 MG/DL (ref 68–131)
ERYTHROCYTE [DISTWIDTH] IN BLOOD BY AUTOMATED COUNT: 12.3 % (ref 11.5–14.5)
GFR SERPLBLD CREATININE-BSD FMLA CKD-EPI: >60 ML/MIN/1.73/M2
GLUCOSE SERPL-MCNC: 83 MG/DL (ref 70–110)
HBA1C MFR BLD: 5.8 % (ref 4–5.6)
HCT VFR BLD AUTO: 38.2 % (ref 40–54)
HGB BLD-MCNC: 13 GM/DL (ref 14–18)
IMM GRANULOCYTES # BLD AUTO: 0.01 K/UL (ref 0–0.04)
IMM GRANULOCYTES NFR BLD AUTO: 0.2 % (ref 0–0.5)
LYMPHOCYTES # BLD AUTO: 2.06 K/UL (ref 1–4.8)
MAGNESIUM SERPL-MCNC: 1.5 MG/DL (ref 1.6–2.6)
MCH RBC QN AUTO: 31.6 PG (ref 27–31)
MCHC RBC AUTO-ENTMCNC: 34 G/DL (ref 32–36)
MCV RBC AUTO: 93 FL (ref 82–98)
NUCLEATED RBC (/100WBC) (OHS): 0 /100 WBC
OHS QRS DURATION: 80 MS
OHS QTC CALCULATION: 448 MS
PHOSPHATE SERPL-MCNC: 2.6 MG/DL (ref 2.7–4.5)
PLATELET # BLD AUTO: 228 K/UL (ref 150–450)
PMV BLD AUTO: 10.1 FL (ref 9.2–12.9)
POCT GLUCOSE: 116 MG/DL (ref 70–110)
POCT GLUCOSE: 86 MG/DL (ref 70–110)
POCT GLUCOSE: 90 MG/DL (ref 70–110)
POCT GLUCOSE: 96 MG/DL (ref 70–110)
POTASSIUM SERPL-SCNC: 4 MMOL/L (ref 3.5–5.1)
PROT SERPL-MCNC: 6.6 GM/DL (ref 6–8.4)
RBC # BLD AUTO: 4.11 M/UL (ref 4.6–6.2)
RELATIVE EOSINOPHIL (OHS): 3 %
RELATIVE LYMPHOCYTE (OHS): 34 % (ref 18–48)
RELATIVE MONOCYTE (OHS): 11.6 % (ref 4–15)
RELATIVE NEUTROPHIL (OHS): 51 % (ref 38–73)
SODIUM SERPL-SCNC: 136 MMOL/L (ref 136–145)
WBC # BLD AUTO: 6.05 K/UL (ref 3.9–12.7)

## 2025-04-04 PROCEDURE — 11000001 HC ACUTE MED/SURG PRIVATE ROOM

## 2025-04-04 PROCEDURE — 25000003 PHARM REV CODE 250: Performed by: NURSE PRACTITIONER

## 2025-04-04 PROCEDURE — 83735 ASSAY OF MAGNESIUM: CPT | Performed by: NURSE PRACTITIONER

## 2025-04-04 PROCEDURE — S4991 NICOTINE PATCH NONLEGEND: HCPCS | Performed by: NURSE PRACTITIONER

## 2025-04-04 PROCEDURE — 86361 T CELL ABSOLUTE COUNT: CPT | Performed by: NURSE PRACTITIONER

## 2025-04-04 PROCEDURE — 63600175 PHARM REV CODE 636 W HCPCS: Performed by: NURSE PRACTITIONER

## 2025-04-04 PROCEDURE — 80053 COMPREHEN METABOLIC PANEL: CPT | Performed by: NURSE PRACTITIONER

## 2025-04-04 PROCEDURE — 36415 COLL VENOUS BLD VENIPUNCTURE: CPT | Performed by: NURSE PRACTITIONER

## 2025-04-04 PROCEDURE — 85025 COMPLETE CBC W/AUTO DIFF WBC: CPT | Performed by: NURSE PRACTITIONER

## 2025-04-04 PROCEDURE — 63600175 PHARM REV CODE 636 W HCPCS: Performed by: HOSPITALIST

## 2025-04-04 PROCEDURE — 99233 SBSQ HOSP IP/OBS HIGH 50: CPT | Mod: ,,, | Performed by: SURGERY

## 2025-04-04 PROCEDURE — 84100 ASSAY OF PHOSPHORUS: CPT | Performed by: NURSE PRACTITIONER

## 2025-04-04 RX ORDER — MAGNESIUM SULFATE HEPTAHYDRATE 40 MG/ML
2 INJECTION, SOLUTION INTRAVENOUS ONCE
Status: COMPLETED | OUTPATIENT
Start: 2025-04-04 | End: 2025-04-04

## 2025-04-04 RX ADMIN — GABAPENTIN 800 MG: 400 CAPSULE ORAL at 02:04

## 2025-04-04 RX ADMIN — METOPROLOL SUCCINATE 25 MG: 25 TABLET, EXTENDED RELEASE ORAL at 10:04

## 2025-04-04 RX ADMIN — GABAPENTIN 800 MG: 400 CAPSULE ORAL at 10:04

## 2025-04-04 RX ADMIN — DIVALPROEX SODIUM 250 MG: 250 TABLET, EXTENDED RELEASE ORAL at 12:04

## 2025-04-04 RX ADMIN — SODIUM CHLORIDE, POTASSIUM CHLORIDE, SODIUM LACTATE AND CALCIUM CHLORIDE: 600; 310; 30; 20 INJECTION, SOLUTION INTRAVENOUS at 12:04

## 2025-04-04 RX ADMIN — BICTEGRAVIR SODIUM, EMTRICITABINE, AND TENOFOVIR ALAFENAMIDE FUMARATE 1 TABLET: 50; 200; 25 TABLET ORAL at 10:04

## 2025-04-04 RX ADMIN — MAGNESIUM SULFATE HEPTAHYDRATE 2 G: 40 INJECTION, SOLUTION INTRAVENOUS at 11:04

## 2025-04-04 RX ADMIN — THERA TABS 1 TABLET: TAB at 10:04

## 2025-04-04 RX ADMIN — GABAPENTIN 800 MG: 400 CAPSULE ORAL at 08:04

## 2025-04-04 RX ADMIN — LISINOPRIL 5 MG: 5 TABLET ORAL at 10:04

## 2025-04-04 RX ADMIN — SODIUM CHLORIDE, POTASSIUM CHLORIDE, SODIUM LACTATE AND CALCIUM CHLORIDE: 600; 310; 30; 20 INJECTION, SOLUTION INTRAVENOUS at 08:04

## 2025-04-04 RX ADMIN — ENOXAPARIN SODIUM 40 MG: 40 INJECTION SUBCUTANEOUS at 05:04

## 2025-04-04 RX ADMIN — DIVALPROEX SODIUM 250 MG: 250 TABLET, EXTENDED RELEASE ORAL at 08:04

## 2025-04-04 RX ADMIN — AMITRIPTYLINE HYDROCHLORIDE 50 MG: 25 TABLET, FILM COATED ORAL at 12:04

## 2025-04-04 RX ADMIN — AMITRIPTYLINE HYDROCHLORIDE 50 MG: 25 TABLET, FILM COATED ORAL at 08:04

## 2025-04-04 RX ADMIN — ENOXAPARIN SODIUM 40 MG: 40 INJECTION SUBCUTANEOUS at 12:04

## 2025-04-04 RX ADMIN — ONDANSETRON 4 MG: 2 INJECTION INTRAMUSCULAR; INTRAVENOUS at 08:04

## 2025-04-04 RX ADMIN — SODIUM CHLORIDE, POTASSIUM CHLORIDE, SODIUM LACTATE AND CALCIUM CHLORIDE: 600; 310; 30; 20 INJECTION, SOLUTION INTRAVENOUS at 07:04

## 2025-04-04 RX ADMIN — Medication 1 PATCH: at 08:04

## 2025-04-04 RX ADMIN — DIVALPROEX SODIUM 250 MG: 250 TABLET, EXTENDED RELEASE ORAL at 10:04

## 2025-04-04 NOTE — H&P
[facility] Medicine  History & Physical    Patient Name: Ang Loya  MRN: [id number]  Patient Class: IP- Inpatient  Admission Date: 4/3/2025  Attending Physician: Miguel Ángel Arora MD   Primary Care Provider: [facility]         Patient information was obtained from patient, past medical records, and ER records.     Subjective:     Principal Problem:SBO (small bowel obstruction)    Chief Complaint:   Chief Complaint   Patient presents with    Abdominal Pain     Sent from [facility] for epigastric pain, diarrhea, nausea and vomiting x 3 days.        HPI: A 59 year old male with a PMHX of DM, HTN, and HIV presented to the ED with complaints of abdominal pain and diarrhea for the past 3 days. Patients states upon symptom onset he was nauseated and reports vomiting 3 times on the first day. Since then his only symptoms have been diarrhea and abdominal pain. Patient denies bloody stools, fever, or urinary symptoms. Patient currently resides in a shelter and denies any sick contacts. Patient states poor PO intake due to worsening of abdominal pain. In  the ED, CT abdomen/pelvis showed: Mild fluid distention of mid small bowel loops with air-fluid levels which could reflect at least partial developing small bowel obstruction. Patient reports similar findings and diagnosis last year. He was admitted to hospital medicine for further management and evaluation.     Past Medical History:   Diagnosis Date    Diabetes mellitus     HIV (human immunodeficiency virus infection) 1998    Hypertension        No past surgical history on file.    Review of patient's allergies indicates:   Allergen Reactions    Duloxetine     Lithium analogues        No current facility-administered medications on file prior to encounter.     Current Outpatient Medications on File Prior to Encounter   Medication Sig    albuterol (PROVENTIL/VENTOLIN HFA) 90 mcg/actuation inhaler 1 puff every 4 (four) hours as needed.     ALPRAZolam (XANAX) 2 MG Tab Take by mouth 2 (two) times daily as needed.    amitriptyline (ELAVIL) 50 MG tablet Take 50 mg by mouth every evening.    aspirin (ECOTRIN) 81 MG EC tablet Take 81 mg by mouth.    BIKTARVY -25 mg (25 kg or greater) Take 1 tablet by mouth.    blood sugar diagnostic Strp 1 strip by Other route.    diclofenac sodium (VOLTAREN) 1 % Gel     divalproex ER (DEPAKOTE ER) 250 MG 24 hr tablet Take 250 mg by mouth 2 (two) times daily.    famotidine (PEPCID) 20 MG tablet Take 20 mg by mouth.    fluticasone propionate (FLONASE) 50 mcg/actuation nasal spray     gabapentin (NEURONTIN) 800 MG tablet Take 800 mg by mouth 3 (three) times daily.    HIGH POTENCY MULTIVITAMIN 400 mcg Tab Take 1 tablet by mouth.    lisinopriL (PRINIVIL,ZESTRIL) 5 MG tablet Take 5 mg by mouth once daily.    metFORMIN (GLUCOPHAGE) 500 MG tablet metformin 500 mg tablet   Take 1 tablet twice a day by oral route.    metoprolol succinate (TOPROL-XL) 25 MG 24 hr tablet Take 25 mg by mouth.    neomycin-polymyxin-dexamethasone (MAXITROL) 3.5mg/mL-10,000 unit/mL-0.1 % DrpS Place 1 drop into both eyes 3 (three) times daily.    ondansetron (ZOFRAN) 4 MG tablet Take 1 tablet (4 mg total) by mouth every 8 (eight) hours as needed (Nausea and vomiting).    ONETOUCH VERIO Strp     pantoprazole (PROTONIX) 40 MG tablet Please take 1 tablet by mouth every 12 hr when you have abdominal pain, then take 1 tablet every day.    TRADJENTA 5 mg Tab tablet Take 5 mg by mouth.    VIAGRA 100 mg tablet TK 1 T PO  PRF ED     Family History    None       Tobacco Use    Smoking status: Every Day     Current packs/day: 1.00     Types: Cigarettes    Smokeless tobacco: Never   Substance and Sexual Activity    Alcohol use: Not Currently     Comment: OCC    Drug use: Not Currently    Sexual activity: Not Currently     Review of Systems   Constitutional:  Positive for appetite change. Negative for chills, diaphoresis and fever.   HENT:  Negative for  congestion, sneezing and sore throat.    Eyes:  Negative for photophobia and visual disturbance.   Respiratory:  Negative for cough and shortness of breath.    Cardiovascular:  Negative for chest pain and palpitations.   Gastrointestinal:  Positive for abdominal pain and diarrhea. Negative for anal bleeding, blood in stool, nausea and vomiting.   Endocrine: Negative for cold intolerance, polyphagia and polyuria.   Genitourinary:  Negative for difficulty urinating, dysuria, frequency and urgency.   Musculoskeletal:  Negative for arthralgias and myalgias.   Skin:  Negative for rash and wound.   Neurological:  Positive for light-headedness. Negative for dizziness and syncope.   Hematological:  Negative for adenopathy.   Psychiatric/Behavioral:  Negative for agitation. The patient is not nervous/anxious.      Objective:     Vital Signs (Most Recent):  Temp: 97.9 °F (36.6 °C) (04/03/25 2154)  Pulse: 74 (04/03/25 2154)  Resp: 16 (04/03/25 2154)  BP: 119/72 (04/03/25 2154)  SpO2: 96 % (04/03/25 2154) Vital Signs (24h Range):  Temp:  [97.7 °F (36.5 °C)-98.3 °F (36.8 °C)] 97.9 °F (36.6 °C)  Pulse:  [74-90] 74  Resp:  [16-18] 16  SpO2:  [96 %-98 %] 96 %  BP: (119-123)/(72-76) 119/72     Weight: 81.6 kg (180 lb)  Body mass index is 25.1 kg/m².     Physical Exam  Vitals and nursing note reviewed.   Constitutional:       General: He is not in acute distress.     Appearance: He is ill-appearing.   HENT:      Head: Normocephalic and atraumatic.   Cardiovascular:      Rate and Rhythm: Normal rate and regular rhythm.      Heart sounds: Normal heart sounds. No murmur heard.     No friction rub. No gallop.   Pulmonary:      Effort: Pulmonary effort is normal.      Breath sounds: Normal breath sounds.   Abdominal:      Palpations: Abdomen is soft. There is no mass.      Tenderness: There is abdominal tenderness. There is no right CVA tenderness, left CVA tenderness, guarding or rebound.      Hernia: No hernia is present.      Comments:  Positive for epigastric and right upper quadrant tenderness   Musculoskeletal:         General: Normal range of motion.      Cervical back: Normal range of motion.   Skin:     General: Skin is warm and dry.   Neurological:      General: No focal deficit present.      Mental Status: He is alert and oriented to person, place, and time.   Psychiatric:         Mood and Affect: Mood normal.         Behavior: Behavior normal.                Significant Labs: All pertinent labs within the past 24 hours have been reviewed.  CBC:   Recent Labs   Lab 04/03/25  1834   WBC 9.65   HGB 15.8   HCT 46.9        CMP:   Recent Labs   Lab 04/03/25  1834      K 4.4      CO2 20*   BUN 23*   CREATININE 1.1   CALCIUM 9.5   ALBUMIN 3.7   BILITOT 0.6   ALKPHOS 71   AST 32   ALT 47*   ANIONGAP 11       Significant Imaging: I have reviewed all pertinent imaging results/findings within the past 24 hours.  Assessment/Plan:     Assessment & Plan  SBO (small bowel obstruction)  CT- Mild fluid distention of mid small bowel loops with air-fluid levels which could reflect at least partial developing small bowel obstruction. Appearance of clear transition point is seen in the left mid abdomen with decompressed small bowel loop seen distal from this region. No obvious mechanical cause for obstruction is seen.     NPO  IVF  Zofran  Consult General Surgery  Morphine    Human immunodeficiency virus (HIV) disease  Pt currently on Biktarvy -25mg - will continue    Diabetes mellitus  Patient's FSGs are controlled on current medication regimen.  Last A1c reviewed-   Lab Results   Component Value Date    HGBA1C 5.9 (H) 09/08/2024     Most recent fingerstick glucose reviewed-   Recent Labs   Lab 04/03/25  1853   POCTGLUCOSE 126*     Current correctional scale  Low  Decrease anti-hyperglycemic dose as follows-   Antihyperglycemics (From admission, onward)      Start     Stop Route Frequency Ordered    04/03/25 9900  insulin aspart U-100  pen 0-5 Units         -- SubQ Every 6 hours PRN 04/03/25 2236          Hold Oral hypoglycemics while patient is in the hospital.  VTE Risk Mitigation (From admission, onward)           Ordered     enoxaparin injection 40 mg  Daily         04/03/25 2237     IP VTE LOW RISK PATIENT  Once         04/03/25 2237     Place sequential compression device  Until discontinued         04/03/25 2236                                    Pedro Bryant NP  Department of Hospital Medicine  [facility] ([place])

## 2025-04-04 NOTE — PLAN OF CARE
Medicare Message     Important Message from Medicare regarding Discharge Appeal Rights Explained to patient/caregiver; Signed/date by patient/caregiver   Date IMM was signed 4/4/2025   Time IMM was signed 1845

## 2025-04-04 NOTE — SUBJECTIVE & OBJECTIVE
Past Medical History:   Diagnosis Date    Diabetes mellitus     HIV (human immunodeficiency virus infection) 1998    Hypertension        No past surgical history on file.    Review of patient's allergies indicates:   Allergen Reactions    Duloxetine     Lithium analogues        No current facility-administered medications on file prior to encounter.     Current Outpatient Medications on File Prior to Encounter   Medication Sig    albuterol (PROVENTIL/VENTOLIN HFA) 90 mcg/actuation inhaler 1 puff every 4 (four) hours as needed.    ALPRAZolam (XANAX) 2 MG Tab Take by mouth 2 (two) times daily as needed.    amitriptyline (ELAVIL) 50 MG tablet Take 50 mg by mouth every evening.    aspirin (ECOTRIN) 81 MG EC tablet Take 81 mg by mouth.    BIKTARVY -25 mg (25 kg or greater) Take 1 tablet by mouth.    blood sugar diagnostic Strp 1 strip by Other route.    diclofenac sodium (VOLTAREN) 1 % Gel     divalproex ER (DEPAKOTE ER) 250 MG 24 hr tablet Take 250 mg by mouth 2 (two) times daily.    famotidine (PEPCID) 20 MG tablet Take 20 mg by mouth.    fluticasone propionate (FLONASE) 50 mcg/actuation nasal spray     gabapentin (NEURONTIN) 800 MG tablet Take 800 mg by mouth 3 (three) times daily.    HIGH POTENCY MULTIVITAMIN 400 mcg Tab Take 1 tablet by mouth.    lisinopriL (PRINIVIL,ZESTRIL) 5 MG tablet Take 5 mg by mouth once daily.    metFORMIN (GLUCOPHAGE) 500 MG tablet metformin 500 mg tablet   Take 1 tablet twice a day by oral route.    metoprolol succinate (TOPROL-XL) 25 MG 24 hr tablet Take 25 mg by mouth.    neomycin-polymyxin-dexamethasone (MAXITROL) 3.5mg/mL-10,000 unit/mL-0.1 % DrpS Place 1 drop into both eyes 3 (three) times daily.    ondansetron (ZOFRAN) 4 MG tablet Take 1 tablet (4 mg total) by mouth every 8 (eight) hours as needed (Nausea and vomiting).    ONETOUCH VERIO Strp     pantoprazole (PROTONIX) 40 MG tablet Please take 1 tablet by mouth every 12 hr when you have abdominal pain, then take 1 tablet every  day.    TRADJENTA 5 mg Tab tablet Take 5 mg by mouth.    VIAGRA 100 mg tablet TK 1 T PO  PRF ED     Family History    None       Tobacco Use    Smoking status: Every Day     Current packs/day: 1.00     Types: Cigarettes    Smokeless tobacco: Never   Substance and Sexual Activity    Alcohol use: Not Currently     Comment: OCC    Drug use: Not Currently    Sexual activity: Not Currently     Review of Systems   Constitutional:  Positive for appetite change. Negative for chills, diaphoresis and fever.   HENT:  Negative for congestion, sneezing and sore throat.    Eyes:  Negative for photophobia and visual disturbance.   Respiratory:  Negative for cough and shortness of breath.    Cardiovascular:  Negative for chest pain and palpitations.   Gastrointestinal:  Positive for abdominal pain and diarrhea. Negative for anal bleeding, blood in stool, nausea and vomiting.   Endocrine: Negative for cold intolerance, polyphagia and polyuria.   Genitourinary:  Negative for difficulty urinating, dysuria, frequency and urgency.   Musculoskeletal:  Negative for arthralgias and myalgias.   Skin:  Negative for rash and wound.   Neurological:  Positive for light-headedness. Negative for dizziness and syncope.   Hematological:  Negative for adenopathy.   Psychiatric/Behavioral:  Negative for agitation. The patient is not nervous/anxious.      Objective:     Vital Signs (Most Recent):  Temp: 97.9 °F (36.6 °C) (04/03/25 2154)  Pulse: 74 (04/03/25 2154)  Resp: 16 (04/03/25 2154)  BP: 119/72 (04/03/25 2154)  SpO2: 96 % (04/03/25 2154) Vital Signs (24h Range):  Temp:  [97.7 °F (36.5 °C)-98.3 °F (36.8 °C)] 97.9 °F (36.6 °C)  Pulse:  [74-90] 74  Resp:  [16-18] 16  SpO2:  [96 %-98 %] 96 %  BP: (119-123)/(72-76) 119/72     Weight: 81.6 kg (180 lb)  Body mass index is 25.1 kg/m².     Physical Exam  Vitals and nursing note reviewed.   Constitutional:       General: He is not in acute distress.     Appearance: He is ill-appearing.   HENT:      Head:  Normocephalic and atraumatic.   Cardiovascular:      Rate and Rhythm: Normal rate and regular rhythm.      Heart sounds: Normal heart sounds. No murmur heard.     No friction rub. No gallop.   Pulmonary:      Effort: Pulmonary effort is normal.      Breath sounds: Normal breath sounds.   Abdominal:      Palpations: Abdomen is soft. There is no mass.      Tenderness: There is abdominal tenderness. There is no right CVA tenderness, left CVA tenderness, guarding or rebound.      Hernia: No hernia is present.      Comments: Positive for epigastric and right upper quadrant tenderness   Musculoskeletal:         General: Normal range of motion.      Cervical back: Normal range of motion.   Skin:     General: Skin is warm and dry.   Neurological:      General: No focal deficit present.      Mental Status: He is alert and oriented to person, place, and time.   Psychiatric:         Mood and Affect: Mood normal.         Behavior: Behavior normal.                Significant Labs: All pertinent labs within the past 24 hours have been reviewed.  CBC:   Recent Labs   Lab 04/03/25  1834   WBC 9.65   HGB 15.8   HCT 46.9        CMP:   Recent Labs   Lab 04/03/25  1834      K 4.4      CO2 20*   BUN 23*   CREATININE 1.1   CALCIUM 9.5   ALBUMIN 3.7   BILITOT 0.6   ALKPHOS 71   AST 32   ALT 47*   ANIONGAP 11       Significant Imaging: I have reviewed all pertinent imaging results/findings within the past 24 hours.

## 2025-04-04 NOTE — HOSPITAL COURSE
Patient is a 59-year-old man with hypertension, diabetes mellitus type 2, HIV on retroviral therapy, substance abuse disorder with recent cocaine use presented to the hospital with acute abdominal pain.  CT imaging concerning for bowel obstruction.  Patient the hospital and treated with intravenous pain medications and supportive care.  General surgery consulted.  Nonoperative management advised.  Abdominal exam reassuring.  Diet successfully advanced.  Patient tolerating oral intake without issue.  Suspect abdominal pain possibly related to mesenteric ischemia related to recent cocaine use.  Patient counseled abstaining from further cocaine use.  Stable to discharge back to [facility] to continue with substance abuse treatment.

## 2025-04-04 NOTE — HPI
59 year old male with a PMHX of DM, HTN, and HIV presented to the ED with complaints of abdominal pain and diarrhea for the past 3 days. Patients states upon symptom onset he was nauseated and reports vomiting 3 times on the first day. Since then his only symptoms have been diarrhea and abdominal pain. Patient denies bloody stools, fever, or urinary symptoms. Patient currently resides in a shelter and denies any sick contacts. Patient states poor PO intake due to worsening of abdominal pain. In  the ED, CT abdomen/pelvis showed: Mild fluid distention of mid small bowel loops with air-fluid levels which could reflect at least partial developing small bowel obstruction.

## 2025-04-04 NOTE — PLAN OF CARE
Pt remained free of falls and injuries throughout shift. AAOx4. Purposeful hourly rounding performed. Pt swallows meds whole. LR infusing @ 100 mL/hr. Patient ambulates with standby assistance, noting dizziness, educated to call for assist. Urinal at bedside in reach. NPO maintained. VSS on room air. Bed low and locked, bed alarm on, call light in reach. Side rails up x2. Report given to CARROL Mcclain.

## 2025-04-04 NOTE — ASSESSMENT & PLAN NOTE
Seems to be improved   Okay to advance diet   Likely discharge today since patient does not seem to have signs of obstruction at this point.

## 2025-04-04 NOTE — ASSESSMENT & PLAN NOTE
Patient's FSGs are controlled on current medication regimen.  Last A1c reviewed-   Lab Results   Component Value Date    HGBA1C 5.9 (H) 09/08/2024     Most recent fingerstick glucose reviewed-   Recent Labs   Lab 04/03/25  1853   POCTGLUCOSE 126*     Current correctional scale  Low  Decrease anti-hyperglycemic dose as follows-   Antihyperglycemics (From admission, onward)      Start     Stop Route Frequency Ordered    04/03/25 2254  insulin aspart U-100 pen 0-5 Units         -- SubQ Every 6 hours PRN 04/03/25 2236          Hold Oral hypoglycemics while patient is in the hospital.

## 2025-04-04 NOTE — PROGRESS NOTES
"[facility], operated by [facility] Medicine  Progress Note    Patient Name: Ang Loya  MRN: [id number]  Patient Class: IP- Inpatient   Admission Date: 4/3/2025  Length of Stay: 1 days  Attending Physician: Miguel Ángel Arora MD  Primary Care Provider: [facility]        Subjective     Principal Problem:Small bowel obstruction        HPI:  Per Pedro Bryant, NP:    "A 59 year old male with a PMHX of DM, HTN, and HIV presented to the ED with complaints of abdominal pain and diarrhea for the past 3 days. Patients states upon symptom onset he was nauseated and reports vomiting 3 times on the first day. Since then his only symptoms have been diarrhea and abdominal pain. Patient denies bloody stools, fever, or urinary symptoms. Patient currently resides in a shelter and denies any sick contacts. Patient states poor PO intake due to worsening of abdominal pain. In  the ED, CT abdomen/pelvis showed: Mild fluid distention of mid small bowel loops with air-fluid levels which could reflect at least partial developing small bowel obstruction. Patient reports similar findings and diagnosis last year. He was admitted to hospital medicine for further management and evaluation."    Overview/Hospital Course:  Patient is a 59-year-old man with hypertension, diabetes mellitus type 2, HIV on retroviral therapy, substance abuse disorder with recent cocaine use presented to the hospital with acute abdominal pain.  CT imaging concerning for bowel obstruction.  Patient the hospital and treated with intravenous pain medications and supportive care.  General surgery consulted.  Nonoperative management advised.    Interval History: Reports feeling much better.  Reports appetite.    Review of Systems   Constitutional:  Negative for chills and fever.   Respiratory:  Negative for shortness of breath.    Cardiovascular:  Negative for chest pain.   Gastrointestinal:  Negative for abdominal pain, constipation, " diarrhea, nausea and vomiting.     Objective:     Vital Signs (Most Recent):  Temp: 98 °F (36.7 °C) (04/04/25 1552)  Pulse: 66 (04/04/25 1552)  Resp: 16 (04/04/25 1552)  BP: 131/73 (04/04/25 1552)  SpO2: 98 % (04/04/25 1552) Vital Signs (24h Range):  Temp:  [97.6 °F (36.4 °C)-98.4 °F (36.9 °C)] 98 °F (36.7 °C)  Pulse:  [58-90] 66  Resp:  [16-18] 16  SpO2:  [94 %-99 %] 98 %  BP: ()/(56-76) 131/73     Weight: 75.8 kg (167 lb 3.2 oz)  Body mass index is 23.32 kg/m².    Intake/Output Summary (Last 24 hours) at 4/4/2025 1607  Last data filed at 4/4/2025 1252  Gross per 24 hour   Intake 2129.18 ml   Output 1050 ml   Net 1079.18 ml         Physical Exam  Cardiovascular:      Rate and Rhythm: Normal rate and regular rhythm.      Heart sounds: Normal heart sounds. No murmur heard.     No friction rub. No gallop.   Pulmonary:      Effort: Pulmonary effort is normal. No respiratory distress.      Breath sounds: Normal breath sounds. No wheezing or rales.   Abdominal:      General: Bowel sounds are normal. There is no distension.      Palpations: Abdomen is soft.      Tenderness: There is no abdominal tenderness.   Musculoskeletal:         General: No tenderness. Normal range of motion.   Skin:     General: Skin is warm and dry.      Coloration: Skin is not pale.      Findings: No erythema or rash.   Neurological:      Mental Status: He is alert and oriented to person, place, and time.               Significant Labs: All pertinent labs within the past 24 hours have been reviewed.    Significant Imaging: I have reviewed all pertinent imaging results/findings within the past 24 hours.      Assessment & Plan  Small bowel obstruction  Abdominal exam this morning reassuring.  Possible bowel obstruction resolving or acute abdominal pain related to administer ischemia from recent cocaine use.  Restarted on liquid diet.  Will advance as tolerated.  Continue supportive care.  Human immunodeficiency virus (HIV) disease  Pt  currently on Biktarvy -25mg - will continue  Diabetes mellitus  Reasonably controlled with current regimen.  Will continue with current regimen and continue to monitor.  Cocaine use  Patient counseled to abstain from further cocaine use.  Hypertension  Reasonably controlled with current regimen.  Will continue with current regimen and continue to monitor.  Tobacco use  Risks of cigarette smoking reviewed with patient in detail for 6 minutes.  Patient counseled on the importance of smoking cessation.  Nicotine replacement ordered.  Bipolar disorder  Without evidence of acute decompensation.  Continue home regimen.  Outpatient psychiatry as follow up advised.  VTE Risk Mitigation (From admission, onward)           Ordered     enoxaparin injection 40 mg  Daily         04/03/25 2237     IP VTE LOW RISK PATIENT  Once         04/03/25 2237     Place sequential compression device  Until discontinued         04/03/25 2236                    Miguel Ángel Arora MD  Department of Hospital Medicine   [facility] ([place])

## 2025-04-04 NOTE — SUBJECTIVE & OBJECTIVE
[facility]  [facility]  [address] [address]  Dept: [phone]  Dept Fax: [phone]        Postoperative follow-up note    Subjective:   Cooper Copeland is a 69y.o. year old male who presents to our office today for postoperative followup regarding his   1. Primary osteoarthritis of right hip    2. Status post total replacement of right hip        Chief Complaint   Patient presents with   Mabel Meyer     DOS:6/2/2020     Cooper Copeland  is a 69y.o. year old male who presents to our office today for postoperative follow up after having undergone a right total hip arthroplasty on 6/2/2020. The patient denies fevers, chills, nausea, vomiting, diarrhea. The patient has started physical therapy and states that he has 1 session left. Overall the patient states he is very happy that he had the right hip done. 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 Patient states he is interested in beginning swimming, bike riding and working out and is wondering if he is cleared for those activities at this time. Patient also had questions about antibiotic prophylaxis and when he can begin having his teeth cleaned again. Review of Systems   Constitutional: Negative for activity change and fever. HENT: Negative for sneezing. Respiratory: Negative for cough and shortness of breath. Cardiovascular: Negative for chest pain. Gastrointestinal: Negative for vomiting. Musculoskeletal: Negative for arthralgias, joint swelling and myalgias. Skin: Negative for color change. Neurological: Negative for weakness and numbness. Psychiatric/Behavioral: Negative for sleep disturbance. Objective :   General: Cooper Copeland is a 69 y.o. male who is alert and oriented and sitting comfortably in our office.   Ortho No current facility-administered medications on file prior to encounter.     Current Outpatient Medications on File Prior to Encounter   Medication Sig    albuterol (PROVENTIL/VENTOLIN HFA) 90 mcg/actuation inhaler 1 puff every 4 (four) hours as needed.    ALPRAZolam (XANAX) 2 MG Tab Take by mouth 2 (two) times daily as needed.    amitriptyline (ELAVIL) 50 MG tablet Take 50 mg by mouth every evening.    aspirin (ECOTRIN) 81 MG EC tablet Take 81 mg by mouth.    BIKTARVY -25 mg (25 kg or greater) Take 1 tablet by mouth.    blood sugar diagnostic Strp 1 strip by Other route.    diclofenac sodium (VOLTAREN) 1 % Gel     divalproex ER (DEPAKOTE ER) 250 MG 24 hr tablet Take 250 mg by mouth 2 (two) times daily.    famotidine (PEPCID) 20 MG tablet Take 20 mg by mouth.    fluticasone propionate (FLONASE) 50 mcg/actuation nasal spray     gabapentin (NEURONTIN) 800 MG tablet Take 800 mg by mouth 3 (three) times daily.    HIGH POTENCY MULTIVITAMIN 400 mcg Tab Take 1 tablet by mouth.    lisinopriL (PRINIVIL,ZESTRIL) 5 MG tablet Take 5 mg by mouth once daily.    metFORMIN (GLUCOPHAGE) 500 MG tablet metformin 500 mg tablet   Take 1 tablet twice a day by oral route.    metoprolol succinate (TOPROL-XL) 25 MG 24 hr tablet Take 25 mg by mouth.    neomycin-polymyxin-dexamethasone (MAXITROL) 3.5mg/mL-10,000 unit/mL-0.1 % DrpS Place 1 drop into both eyes 3 (three) times daily.    ondansetron (ZOFRAN) 4 MG tablet Take 1 tablet (4 mg total) by mouth every 8 (eight) hours as needed (Nausea and vomiting).    ONETOUCH VERIO Strp     pantoprazole (PROTONIX) 40 MG tablet Please take 1 tablet by mouth every 12 hr when you have abdominal pain, then take 1 tablet every day.    TRADJENTA 5 mg Tab tablet Take 5 mg by mouth.    VIAGRA 100 mg tablet TK 1 T PO  PRF ED       Review of patient's allergies indicates:   Allergen Reactions    Duloxetine     Lithium analogues        Past Medical History:   Diagnosis Date    Diabetes mellitus     HIV  (human immunodeficiency virus infection) 1998    Hypertension      No past surgical history on file.  Family History    None       Tobacco Use    Smoking status: Every Day     Current packs/day: 1.00     Types: Cigarettes    Smokeless tobacco: Never   Substance and Sexual Activity    Alcohol use: Not Currently     Comment: OCC    Drug use: Not Currently    Sexual activity: Not Currently     Review of Systems   Constitutional:  Negative for appetite change, fatigue, fever and unexpected weight change.   HENT:  Negative for sore throat and trouble swallowing.    Eyes: Negative.    Respiratory:  Negative for cough, shortness of breath and wheezing.    Cardiovascular:  Negative for chest pain and leg swelling.   Gastrointestinal:  Negative for abdominal distention, abdominal pain, blood in stool, constipation, diarrhea, nausea and vomiting.   Endocrine: Negative.    Genitourinary: Negative.    Musculoskeletal:  Negative for back pain.   Skin: Negative.  Negative for rash.   Allergic/Immunologic: Negative.    Neurological: Negative.    Hematological: Negative.    Psychiatric/Behavioral:  Negative for confusion.      Objective:     Vital Signs (Most Recent):  Temp: 98 °F (36.7 °C) (04/04/25 1122)  Pulse: (!) 58 (04/04/25 1122)  Resp: 16 (04/04/25 1122)  BP: 131/68 (04/04/25 1122)  SpO2: 99 % (04/04/25 1122) Vital Signs (24h Range):  Temp:  [97.6 °F (36.4 °C)-98.4 °F (36.9 °C)] 98 °F (36.7 °C)  Pulse:  [58-90] 58  Resp:  [16-18] 16  SpO2:  [94 %-99 %] 99 %  BP: ()/(56-76) 131/68     Weight: 75.8 kg (167 lb 3.2 oz)  Body mass index is 23.32 kg/m².     Physical Exam  Vitals and nursing note reviewed.   Constitutional:       Appearance: He is well-developed.   HENT:      Head: Normocephalic and atraumatic.   Cardiovascular:      Rate and Rhythm: Normal rate.      Heart sounds: Normal heart sounds.   Pulmonary:      Effort: Pulmonary effort is normal.   Abdominal:      General: Bowel sounds are normal. There is no  distension.      Palpations: Abdomen is soft.      Tenderness: There is no abdominal tenderness.   Musculoskeletal:         General: Normal range of motion.      Cervical back: Normal range of motion.   Skin:     General: Skin is warm and dry.      Capillary Refill: Capillary refill takes less than 2 seconds.   Neurological:      Mental Status: He is alert and oriented to person, place, and time.   Psychiatric:         Behavior: Behavior normal.            I have reviewed all pertinent lab results within the past 24 hours.  CBC:   Recent Labs   Lab 04/04/25  0348   WBC 6.05   RBC 4.11*   HGB 13.0*   HCT 38.2*      MCV 93   MCH 31.6*   MCHC 34.0     CMP:   Recent Labs   Lab 04/04/25  0348   CALCIUM 8.8   ALBUMIN 3.1*      K 4.0   CO2 24      BUN 17   CREATININE 0.9   ALKPHOS 58   ALT 37   AST 26   BILITOT 0.6       Significant Diagnostics:  I have reviewed all pertinent imaging results/findings within the past 24 hours.  CT: I have reviewed all pertinent results/findings within the past 24 hours and my personal findings are:  Concern for possible small-bowel obstruction

## 2025-04-04 NOTE — ASSESSMENT & PLAN NOTE
Abdominal exam this morning reassuring.  Possible bowel obstruction resolving or acute abdominal pain related to administer ischemia from recent cocaine use.  Restarted on liquid diet.  Will advance as tolerated.  Continue supportive care.

## 2025-04-04 NOTE — ASSESSMENT & PLAN NOTE
Without evidence of acute decompensation.  Continue home regimen.  Outpatient psychiatry as follow up advised.   none

## 2025-04-04 NOTE — ASSESSMENT & PLAN NOTE
Risks of cigarette smoking reviewed with patient in detail for 6 minutes.  Patient counseled on the importance of smoking cessation.  Nicotine replacement ordered.

## 2025-04-04 NOTE — PLAN OF CARE
POC reviewed with the patient. AOX4 VSS on RA. All due medication given as per MAR. Nauseated early this morning, Prn medication given as per MAR. Diet advanced, tolerated well, no abdominal pain, nausea and vomiting noted. Purposeful rounding done. Safety precautions maintained.     Problem: Adult Inpatient Plan of Care   Goal: Plan of Care Review  Outcome: Progressing  Goal: Patient-Specific Goal (Individualized)  Outcome: Progressing  Goal: Absence of Hospital-Acquired Illness or Injury  Outcome: Progressing  Goal: Optimal Comfort and Wellbeing  Outcome: Progressing  Goal: Readiness for Transition of Care  Outcome: Progressing     Problem: Diabetes Comorbidity  Goal: Blood Glucose Level Within Targeted Range  Outcome: Progressing     Problem: Intestinal Obstruction  Goal: Optimal Bowel Function  Outcome: Progressing  Goal: Fluid and Electrolyte Balance  Outcome: Progressing  Goal: Absence of Infection Signs and Symptoms  Outcome: Progressing  Goal: Optimize Nutrition Status  Outcome: Progressing  Goal: Optimal Pain Control and Function  Outcome: Progressing

## 2025-04-04 NOTE — PLAN OF CARE
Case Management Assessment     PCP: [facility]  Address: [address] [place] [address]  Phone: [phone]    Pharmacy: Bedside    Patient Arrived From: [facility]  Existing Help at Home: [facility]    Barriers to Discharge: Transportation, homeless, substance abuse    Discharge Plan:    A. [facility]   B. Shelter    A&O x 3; PCP corrected on chart; Independent with ADL's; Does not use DME; Will require a lyft to [facility] at discharge.     [facility] ([place])  Initial Discharge Assessment       Primary Care Provider: [facility]    Admission Diagnosis: Epigastric abdominal pain [R10.13]  SBO (small bowel obstruction) [K56.609]  Nausea and vomiting, unspecified vomiting type [R11.2]    Admission Date: 4/3/2025  Expected Discharge Date:     Transition of Care Barriers: (P) Homeless, Transportation, Substance Abuse    [facility] [employer] [facility] / Plan: [employer] SECURE [employer] / Product Type: Medicare Advantage /     Extended Emergency Contact Information  Primary Emergency Contact: Kita Vides  Mobile Phone: [phone]  Relation: Friend  Preferred language: English   needed? No    Discharge Plan A: (P) Other ([facility])  Discharge Plan B: (P) Shelter    No Pharmacies Listed    Initial Assessment (most recent)       Adult Discharge Assessment - 04/04/25 0742          Discharge Assessment    Assessment Type Discharge Planning Assessment     Confirmed/corrected address, phone number and insurance Yes     Confirmed Demographics Correct on Facesheet     Source of Information patient     Communicated ARMANDO with patient/caregiver Yes     Reason For Admission Small Bowel Obstruction     People in Home --   [facility]/Homeless    Facility Arrived From: [facility]     Do you expect to return to your current living situation? Yes     Do you have help at home or someone to help you manage your care at home? Yes     Who are  your caregiver(s) and their phone number(s)? [facility]     Prior to hospitilization cognitive status: Alert/Oriented     Current cognitive status: Alert/Oriented     Walking or Climbing Stairs Difficulty no     Dressing/Bathing Difficulty no     Equipment Currently Used at Home none     Readmission within 30 days? No     Patient currently being followed by outpatient case management? No     Do you currently have service(s) that help you manage your care at home? No     Do you take prescription medications? Yes     Do you have prescription coverage? Yes (P)      Coverage [facility] SECURE SNP (P)      Do you have any problems affording any of your prescribed medications? TBD (P)      Is the patient taking medications as prescribed? yes (P)      Who is going to help you get home at discharge? Lyft - to [facility] (P)      How do you get to doctors appointments? family or friend will provide;public transportation (P)      Are you on dialysis? No (P)      Do you take coumadin? No (P)      Discharge Plan A Other (P)    [facility]    Discharge Plan B Shelter (P)      DME Needed Upon Discharge  none (P)      Discharge Plan discussed with: Patient (P)      Transition of Care Barriers Homeless;Transportation;Substance Abuse (P)

## 2025-04-04 NOTE — HPI
"Per Pedro Bryant, NP:    "A 59 year old male with a PMHX of DM, HTN, and HIV presented to the ED with complaints of abdominal pain and diarrhea for the past 3 days. Patients states upon symptom onset he was nauseated and reports vomiting 3 times on the first day. Since then his only symptoms have been diarrhea and abdominal pain. Patient denies bloody stools, fever, or urinary symptoms. Patient currently resides in a shelter and denies any sick contacts. Patient states poor PO intake due to worsening of abdominal pain. In  the ED, CT abdomen/pelvis showed: Mild fluid distention of mid small bowel loops with air-fluid levels which could reflect at least partial developing small bowel obstruction. Patient reports similar findings and diagnosis last year. He was admitted to hospital medicine for further management and evaluation."  "

## 2025-04-04 NOTE — SUBJECTIVE & OBJECTIVE
Interval History: Reports feeling much better.  Reports appetite.    Review of Systems   Constitutional:  Negative for chills and fever.   Respiratory:  Negative for shortness of breath.    Cardiovascular:  Negative for chest pain.   Gastrointestinal:  Negative for abdominal pain, constipation, diarrhea, nausea and vomiting.     Objective:     Vital Signs (Most Recent):  Temp: 98 °F (36.7 °C) (04/04/25 1552)  Pulse: 66 (04/04/25 1552)  Resp: 16 (04/04/25 1552)  BP: 131/73 (04/04/25 1552)  SpO2: 98 % (04/04/25 1552) Vital Signs (24h Range):  Temp:  [97.6 °F (36.4 °C)-98.4 °F (36.9 °C)] 98 °F (36.7 °C)  Pulse:  [58-90] 66  Resp:  [16-18] 16  SpO2:  [94 %-99 %] 98 %  BP: ()/(56-76) 131/73     Weight: 75.8 kg (167 lb 3.2 oz)  Body mass index is 23.32 kg/m².    Intake/Output Summary (Last 24 hours) at 4/4/2025 1607  Last data filed at 4/4/2025 1252  Gross per 24 hour   Intake 2129.18 ml   Output 1050 ml   Net 1079.18 ml         Physical Exam  Cardiovascular:      Rate and Rhythm: Normal rate and regular rhythm.      Heart sounds: Normal heart sounds. No murmur heard.     No friction rub. No gallop.   Pulmonary:      Effort: Pulmonary effort is normal. No respiratory distress.      Breath sounds: Normal breath sounds. No wheezing or rales.   Abdominal:      General: Bowel sounds are normal. There is no distension.      Palpations: Abdomen is soft.      Tenderness: There is no abdominal tenderness.   Musculoskeletal:         General: No tenderness. Normal range of motion.   Skin:     General: Skin is warm and dry.      Coloration: Skin is not pale.      Findings: No erythema or rash.   Neurological:      Mental Status: He is alert and oriented to person, place, and time.               Significant Labs: All pertinent labs within the past 24 hours have been reviewed.    Significant Imaging: I have reviewed all pertinent imaging results/findings within the past 24 hours.

## 2025-04-04 NOTE — ASSESSMENT & PLAN NOTE
CT- Mild fluid distention of mid small bowel loops with air-fluid levels which could reflect at least partial developing small bowel obstruction. Appearance of clear transition point is seen in the left mid abdomen with decompressed small bowel loop seen distal from this region. No obvious mechanical cause for obstruction is seen.     NPO  IVF  Zofran  Consult General Surgery  Morphine

## 2025-04-04 NOTE — HOSPITAL COURSE
Patient doing much better this morning.    Tolerating liquid diet.    Abdominal distention is down.    Has no nausea or vomiting.

## 2025-04-04 NOTE — CONSULTS
[facility] ([place])  General Surgery  Consult Note    Patient Name: Ang Loya  MRN: [id number]  Code Status: Full Code  Admission Date: 4/3/2025  Hospital Length of Stay: 1 days  Attending Physician: Miguel Ángel Arora MD  Primary Care Provider: [facility]    Patient information was obtained from patient and ER records.     Inpatient consult to General Surgery  Consult performed by: Neel Briones MD  Consult ordered by: Miguel Ángel Arora MD  Reason for consult: Small bowel obstruction  Assessment/Recommendations: Patient is not seem to have obstruction on exam findings.    Okay to advance diet   Likely discharge today.            Subjective:     Principal Problem: Small bowel obstruction    History of Present Illness: 59 year old male with a PMHX of DM, HTN, and HIV presented to the ED with complaints of abdominal pain and diarrhea for the past 3 days. Patients states upon symptom onset he was nauseated and reports vomiting 3 times on the first day. Since then his only symptoms have been diarrhea and abdominal pain. Patient denies bloody stools, fever, or urinary symptoms. Patient currently resides in a shelter and denies any sick contacts. Patient states poor PO intake due to worsening of abdominal pain. In  the ED, CT abdomen/pelvis showed: Mild fluid distention of mid small bowel loops with air-fluid levels which could reflect at least partial developing small bowel obstruction.     No current facility-administered medications on file prior to encounter.     Current Outpatient Medications on File Prior to Encounter   Medication Sig    albuterol (PROVENTIL/VENTOLIN HFA) 90 mcg/actuation inhaler 1 puff every 4 (four) hours as needed.    ALPRAZolam (XANAX) 2 MG Tab Take by mouth 2 (two) times daily as needed.    amitriptyline (ELAVIL) 50 MG tablet Take 50 mg by mouth every evening.    aspirin (ECOTRIN) 81 MG EC tablet Take 81 mg by mouth.    BIKTARVY -25 mg (25  kg or greater) Take 1 tablet by mouth.    blood sugar diagnostic Strp 1 strip by Other route.    diclofenac sodium (VOLTAREN) 1 % Gel     divalproex ER (DEPAKOTE ER) 250 MG 24 hr tablet Take 250 mg by mouth 2 (two) times daily.    famotidine (PEPCID) 20 MG tablet Take 20 mg by mouth.    fluticasone propionate (FLONASE) 50 mcg/actuation nasal spray     gabapentin (NEURONTIN) 800 MG tablet Take 800 mg by mouth 3 (three) times daily.    HIGH POTENCY MULTIVITAMIN 400 mcg Tab Take 1 tablet by mouth.    lisinopriL (PRINIVIL,ZESTRIL) 5 MG tablet Take 5 mg by mouth once daily.    metFORMIN (GLUCOPHAGE) 500 MG tablet metformin 500 mg tablet   Take 1 tablet twice a day by oral route.    metoprolol succinate (TOPROL-XL) 25 MG 24 hr tablet Take 25 mg by mouth.    neomycin-polymyxin-dexamethasone (MAXITROL) 3.5mg/mL-10,000 unit/mL-0.1 % DrpS Place 1 drop into both eyes 3 (three) times daily.    ondansetron (ZOFRAN) 4 MG tablet Take 1 tablet (4 mg total) by mouth every 8 (eight) hours as needed (Nausea and vomiting).    ONETOUCH VERIO Strp     pantoprazole (PROTONIX) 40 MG tablet Please take 1 tablet by mouth every 12 hr when you have abdominal pain, then take 1 tablet every day.    TRADJENTA 5 mg Tab tablet Take 5 mg by mouth.    VIAGRA 100 mg tablet TK 1 T PO  PRF ED       Review of patient's allergies indicates:   Allergen Reactions    Duloxetine     Lithium analogues        Past Medical History:   Diagnosis Date    Diabetes mellitus     HIV (human immunodeficiency virus infection) 1998    Hypertension      No past surgical history on file.  Family History    None       Tobacco Use    Smoking status: Every Day     Current packs/day: 1.00     Types: Cigarettes    Smokeless tobacco: Never   Substance and Sexual Activity    Alcohol use: Not Currently     Comment: OCC    Drug use: Not Currently    Sexual activity: Not Currently     Review of Systems   Constitutional:  Negative for appetite change, fatigue, fever and unexpected  weight change.   HENT:  Negative for sore throat and trouble swallowing.    Eyes: Negative.    Respiratory:  Negative for cough, shortness of breath and wheezing.    Cardiovascular:  Negative for chest pain and leg swelling.   Gastrointestinal:  Negative for abdominal distention, abdominal pain, blood in stool, constipation, diarrhea, nausea and vomiting.   Endocrine: Negative.    Genitourinary: Negative.    Musculoskeletal:  Negative for back pain.   Skin: Negative.  Negative for rash.   Allergic/Immunologic: Negative.    Neurological: Negative.    Hematological: Negative.    Psychiatric/Behavioral:  Negative for confusion.      Objective:     Vital Signs (Most Recent):  Temp: 98 °F (36.7 °C) (04/04/25 1122)  Pulse: (!) 58 (04/04/25 1122)  Resp: 16 (04/04/25 1122)  BP: 131/68 (04/04/25 1122)  SpO2: 99 % (04/04/25 1122) Vital Signs (24h Range):  Temp:  [97.6 °F (36.4 °C)-98.4 °F (36.9 °C)] 98 °F (36.7 °C)  Pulse:  [58-90] 58  Resp:  [16-18] 16  SpO2:  [94 %-99 %] 99 %  BP: ()/(56-76) 131/68     Weight: 75.8 kg (167 lb 3.2 oz)  Body mass index is 23.32 kg/m².     Physical Exam  Vitals and nursing note reviewed.   Constitutional:       Appearance: He is well-developed.   HENT:      Head: Normocephalic and atraumatic.   Cardiovascular:      Rate and Rhythm: Normal rate.      Heart sounds: Normal heart sounds.   Pulmonary:      Effort: Pulmonary effort is normal.   Abdominal:      General: Bowel sounds are normal. There is no distension.      Palpations: Abdomen is soft.      Tenderness: There is no abdominal tenderness.   Musculoskeletal:         General: Normal range of motion.      Cervical back: Normal range of motion.   Skin:     General: Skin is warm and dry.      Capillary Refill: Capillary refill takes less than 2 seconds.   Neurological:      Mental Status: He is alert and oriented to person, place, and time.   Psychiatric:         Behavior: Behavior normal.            I have reviewed all pertinent lab  results within the past 24 hours.  CBC:   Recent Labs   Lab 04/04/25  0348   WBC 6.05   RBC 4.11*   HGB 13.0*   HCT 38.2*      MCV 93   MCH 31.6*   MCHC 34.0     CMP:   Recent Labs   Lab 04/04/25  0348   CALCIUM 8.8   ALBUMIN 3.1*      K 4.0   CO2 24      BUN 17   CREATININE 0.9   ALKPHOS 58   ALT 37   AST 26   BILITOT 0.6       Significant Diagnostics:  I have reviewed all pertinent imaging results/findings within the past 24 hours.  CT: I have reviewed all pertinent results/findings within the past 24 hours and my personal findings are:  Concern for possible small-bowel obstruction    Assessment/Plan:     * Small bowel obstruction  Seems to be improved   Okay to advance diet   Likely discharge today since patient does not seem to have signs of obstruction at this point.          VTE Risk Mitigation (From admission, onward)           Ordered     enoxaparin injection 40 mg  Daily         04/03/25 2237     IP VTE LOW RISK PATIENT  Once         04/03/25 2237     Place sequential compression device  Until discontinued         04/03/25 2236                    Thank you for your consult. I will follow-up with patient. Please contact us if you have any additional questions.    Neel Briones MD  General Surgery  [facility] ([place])

## 2025-04-05 VITALS
DIASTOLIC BLOOD PRESSURE: 69 MMHG | RESPIRATION RATE: 16 BRPM | OXYGEN SATURATION: 98 % | TEMPERATURE: 98 F | SYSTOLIC BLOOD PRESSURE: 133 MMHG | WEIGHT: 167.19 LBS | BODY MASS INDEX: 23.41 KG/M2 | HEART RATE: 69 BPM | HEIGHT: 71 IN

## 2025-04-05 LAB
ABSOLUTE EOSINOPHIL (OHS): 0.17 K/UL
ABSOLUTE MONOCYTE (OHS): 0.51 K/UL (ref 0.3–1)
ABSOLUTE NEUTROPHIL COUNT (OHS): 0.91 K/UL (ref 1.8–7.7)
ANION GAP (OHS): 7 MMOL/L (ref 8–16)
BASOPHILS # BLD AUTO: 0.01 K/UL
BASOPHILS NFR BLD AUTO: 0.3 %
BUN SERPL-MCNC: 15 MG/DL (ref 6–20)
CALCIUM SERPL-MCNC: 8.3 MG/DL (ref 8.7–10.5)
CHLORIDE SERPL-SCNC: 106 MMOL/L (ref 95–110)
CO2 SERPL-SCNC: 24 MMOL/L (ref 23–29)
CREAT SERPL-MCNC: 0.8 MG/DL (ref 0.5–1.4)
ERYTHROCYTE [DISTWIDTH] IN BLOOD BY AUTOMATED COUNT: 12.3 % (ref 11.5–14.5)
GFR SERPLBLD CREATININE-BSD FMLA CKD-EPI: >60 ML/MIN/1.73/M2
GLUCOSE SERPL-MCNC: 80 MG/DL (ref 70–110)
HCT VFR BLD AUTO: 29.8 % (ref 40–54)
HGB BLD-MCNC: 9.9 GM/DL (ref 14–18)
IMM GRANULOCYTES # BLD AUTO: 0 K/UL (ref 0–0.04)
IMM GRANULOCYTES NFR BLD AUTO: 0 % (ref 0–0.5)
LYMPHOCYTES # BLD AUTO: 1.46 K/UL (ref 1–4.8)
MAGNESIUM SERPL-MCNC: 1.5 MG/DL (ref 1.6–2.6)
MCH RBC QN AUTO: 31.4 PG (ref 27–31)
MCHC RBC AUTO-ENTMCNC: 33.2 G/DL (ref 32–36)
MCV RBC AUTO: 95 FL (ref 82–98)
NUCLEATED RBC (/100WBC) (OHS): 0 /100 WBC
PHOSPHATE SERPL-MCNC: 2.6 MG/DL (ref 2.7–4.5)
PLATELET # BLD AUTO: 162 K/UL (ref 150–450)
PMV BLD AUTO: 9.9 FL (ref 9.2–12.9)
POCT GLUCOSE: 110 MG/DL (ref 70–110)
POCT GLUCOSE: 150 MG/DL (ref 70–110)
POCT GLUCOSE: 95 MG/DL (ref 70–110)
POTASSIUM SERPL-SCNC: 4.1 MMOL/L (ref 3.5–5.1)
RBC # BLD AUTO: 3.15 M/UL (ref 4.6–6.2)
RELATIVE EOSINOPHIL (OHS): 5.6 %
RELATIVE LYMPHOCYTE (OHS): 47.7 % (ref 18–48)
RELATIVE MONOCYTE (OHS): 16.7 % (ref 4–15)
RELATIVE NEUTROPHIL (OHS): 29.7 % (ref 38–73)
SODIUM SERPL-SCNC: 137 MMOL/L (ref 136–145)
WBC # BLD AUTO: 3.06 K/UL (ref 3.9–12.7)

## 2025-04-05 PROCEDURE — 85025 COMPLETE CBC W/AUTO DIFF WBC: CPT | Performed by: HOSPITALIST

## 2025-04-05 PROCEDURE — 63600175 PHARM REV CODE 636 W HCPCS: Performed by: NURSE PRACTITIONER

## 2025-04-05 PROCEDURE — 84100 ASSAY OF PHOSPHORUS: CPT | Performed by: HOSPITALIST

## 2025-04-05 PROCEDURE — S4991 NICOTINE PATCH NONLEGEND: HCPCS | Performed by: NURSE PRACTITIONER

## 2025-04-05 PROCEDURE — 25000003 PHARM REV CODE 250: Performed by: NURSE PRACTITIONER

## 2025-04-05 PROCEDURE — 83735 ASSAY OF MAGNESIUM: CPT | Performed by: HOSPITALIST

## 2025-04-05 PROCEDURE — 94761 N-INVAS EAR/PLS OXIMETRY MLT: CPT

## 2025-04-05 PROCEDURE — 36415 COLL VENOUS BLD VENIPUNCTURE: CPT | Performed by: HOSPITALIST

## 2025-04-05 PROCEDURE — 80048 BASIC METABOLIC PNL TOTAL CA: CPT | Performed by: HOSPITALIST

## 2025-04-05 PROCEDURE — 25000003 PHARM REV CODE 250: Performed by: HOSPITALIST

## 2025-04-05 RX ORDER — LANOLIN ALCOHOL/MO/W.PET/CERES
400 CREAM (GRAM) TOPICAL DAILY
Qty: 30 TABLET | Refills: 0 | Status: SHIPPED | OUTPATIENT
Start: 2025-04-05

## 2025-04-05 RX ORDER — IBUPROFEN 200 MG
1 TABLET ORAL DAILY
Qty: 30 PATCH | Refills: 0 | Status: SHIPPED | OUTPATIENT
Start: 2025-04-06

## 2025-04-05 RX ORDER — LANOLIN ALCOHOL/MO/W.PET/CERES
400 CREAM (GRAM) TOPICAL ONCE
Status: COMPLETED | OUTPATIENT
Start: 2025-04-05 | End: 2025-04-05

## 2025-04-05 RX ADMIN — THERA TABS 1 TABLET: TAB at 08:04

## 2025-04-05 RX ADMIN — Medication 400 MG: at 09:04

## 2025-04-05 RX ADMIN — DIVALPROEX SODIUM 250 MG: 250 TABLET, EXTENDED RELEASE ORAL at 08:04

## 2025-04-05 RX ADMIN — Medication 1 PATCH: at 08:04

## 2025-04-05 RX ADMIN — BICTEGRAVIR SODIUM, EMTRICITABINE, AND TENOFOVIR ALAFENAMIDE FUMARATE 1 TABLET: 50; 200; 25 TABLET ORAL at 08:04

## 2025-04-05 RX ADMIN — SODIUM CHLORIDE, POTASSIUM CHLORIDE, SODIUM LACTATE AND CALCIUM CHLORIDE: 600; 310; 30; 20 INJECTION, SOLUTION INTRAVENOUS at 06:04

## 2025-04-05 RX ADMIN — LISINOPRIL 5 MG: 5 TABLET ORAL at 08:04

## 2025-04-05 RX ADMIN — METOPROLOL SUCCINATE 25 MG: 25 TABLET, EXTENDED RELEASE ORAL at 08:04

## 2025-04-05 RX ADMIN — GABAPENTIN 800 MG: 400 CAPSULE ORAL at 08:04

## 2025-04-05 NOTE — NURSING
POC reviewed with the patient. AOX4 VSS on RA. All due medications given as per MAR. No further complaints within the shift. AVS handed to patient together with the prescription and packet.  IV removed aseptically. Patient discharged with his belongings and [facility]

## 2025-04-05 NOTE — NURSING
04/05/25 1230   AVS Confirmation   Discharge instructions and AVS provided to and reviewed with patient and/or significant other. Yes     AVS printed and handed to patient by bedside nurse. VN reviewed discharge instructions with patient using teachback method.  Allowed time for questions, all questions answered.  Patient verbalized complete understanding of discharge instructions and voices no concerns. Discharge instructions complete. Bedside nurse notified.

## 2025-04-05 NOTE — PLAN OF CARE
Problem: Adult Inpatient Plan of Care  Goal: Patient-Specific Goal (Individualized)  Outcome: Progressing     Problem: Adult Inpatient Plan of Care  Goal: Absence of Hospital-Acquired Illness or Injury  Outcome: Progressing     Problem: Adult Inpatient Plan of Care  Goal: Optimal Comfort and Wellbeing  Outcome: Progressing     Problem: Diabetes Comorbidity  Goal: Blood Glucose Level Within Targeted Range  Outcome: Progressing     Problem: Intestinal Obstruction  Goal: Fluid and Electrolyte Balance  Outcome: Progressing

## 2025-04-05 NOTE — PLAN OF CARE
Case Management Final Discharge Note    Discharge Disposition: [facility]    New DME ordered / company name: None    Relevant SDOH / Transition of Care Barriers:  None    Person available to provide assistance at home when needed and their contact information: Self    Scheduled followup appointment: Advised to schedule seven day hospital follow up appt    Referrals placed: None    Transportation: Ly[employer]       04/05/25 0908   Final Note   Assessment Type Final Discharge Note   Anticipated Discharge Disposition Home   Hospital Resources/Appts/Education Provided Provided patient/caregiver with written discharge plan information   Post-Acute Status   Discharge Delays None known at this time     [facility] ([place])  Discharge Final Note    Primary Care Provider: Shar[facility]    Expected Discharge Date: 4/5/2025    Final Discharge Note (most recent)       Final Note - 04/05/25 0908          Final Note    Assessment Type Final Discharge Note (P)      Anticipated Discharge Disposition Home or Self Care (P)      Hospital Resources/Appts/Education Provided Provided patient/caregiver with written discharge plan information (P)         Post-Acute Status    Discharge Delays None known at this time (P)                      Important Message from Medicare  Important Message from Medicare regarding Discharge Appeal Rights: Explained to patient/caregiver, Signed/date by patient/caregiver     Date IMM was signed: 04/04/25  Time IMM was signed: 1845    Contact Info       [facility]   Specialty: Internal Medicine, Family Medicine   Relationship: PCP - General    [address]  [facility] [address]   Phone: [phone]       Next Steps: Schedule an appointment as soon as possible for a visit in 1 week(s)

## 2025-04-05 NOTE — DISCHARGE SUMMARY
"[facility] Medicine  Discharge Summary      Patient Name: Ang Loya  MRN: [id number]  DIXIE: [id number]  Patient Class: IP- Inpatient  Admission Date: 4/3/2025  Hospital Length of Stay: 2 days  Discharge Date and Time: 4/5/2025  1:18 PM  Attending Physician: Miguel Ángel Arora MD  Discharging Provider: Miguel Ángel Arora MD  Primary Care Provider: [facility]    HPI:   Per Pedro Bryant, NP:    "A 59 year old male with a PMHX of DM, HTN, and HIV presented to the ED with complaints of abdominal pain and diarrhea for the past 3 days. Patients states upon symptom onset he was nauseated and reports vomiting 3 times on the first day. Since then his only symptoms have been diarrhea and abdominal pain. Patient denies bloody stools, fever, or urinary symptoms. Patient currently resides in a shelter and denies any sick contacts. Patient states poor PO intake due to worsening of abdominal pain. In  the ED, CT abdomen/pelvis showed: Mild fluid distention of mid small bowel loops with air-fluid levels which could reflect at least partial developing small bowel obstruction. Patient reports similar findings and diagnosis last year. He was admitted to hospital medicine for further management and evaluation."    Hospital Course:   Patient is a 59-year-old man with hypertension, diabetes mellitus type 2, HIV on retroviral therapy, substance abuse disorder with recent cocaine use presented to the hospital with acute abdominal pain.  CT imaging concerning for bowel obstruction.  Patient the hospital and treated with intravenous pain medications and supportive care.  General surgery consulted.  Nonoperative management advised.  Abdominal exam reassuring.  Diet successfully advanced.  Patient tolerating oral intake without issue.  Suspect abdominal pain possibly related to mesenteric ischemia related to recent cocaine use.  Patient counseled abstaining from further cocaine use.  " Stable to discharge back to [facility] to continue with substance abuse treatment.     Goals of Care Treatment Preferences:  Code Status: Full Code        Social Drivers of Health with Concerns     Housing Stability: High Risk (4/4/2025)        Consults:   Consults (From admission, onward)          Status Ordering Provider     Inpatient consult to General Surgery  Once        Provider:  Neel Briones MD    Completed JACKIE MAURICE            Final Active Diagnoses:    Diagnosis Date Noted POA    PRINCIPAL PROBLEM:  Small bowel obstruction [K56.609] 04/03/2025 Yes    Cocaine use [F14.90] 04/04/2025 Yes    Hypertension [I10] 04/04/2025 Yes    Bipolar disorder [F31.9] 04/04/2025 Yes    Diabetes mellitus [E11.9] 03/10/2014 Yes    Tobacco use [Z72.0] 03/10/2014 Yes    Human immunodeficiency virus (HIV) disease [B20] 07/28/2008 Yes      Problems Resolved During this Admission:       Discharged Condition: Stable    Disposition: Home or Self Care    Follow Up:   Follow-up Information       Center, [facility]. Schedule an appointment as soon as possible for a visit in 1 week(s).    Specialties: Internal Medicine, Family Medicine  Contact information:  [address] [place] [address] [id number] [phone]                           Patient Instructions:      Ambulatory referral/consult to Smoking Cessation Program   Standing Status: Future   Referral Priority: Routine Referral Type: Consultation   Referral Reason: Specialty Services Required   Requested Specialty: CTTS   Number of Visits Requested: 1     Reason for not Prescribing Nicotine Replacement     Order Specific Question Answer Comments   Reason for not Prescribing: Not medically appropriate at this time Continue hospital med     Medications:  Reconciled Home Medications:      Medication List        START taking these medications      magnesium oxide 400 mg (241.3 mg magnesium) tablet  Commonly known as: MAG-OX  Take 1 tablet (400 mg  total) by mouth once daily.     nicotine 21 mg/24 hr  Commonly known as: NICODERM CQ  Place 1 patch onto the skin once daily.  Start taking on: April 6, 2025            CONTINUE taking these medications      amitriptyline 25 MG tablet  Commonly known as: ELAVIL  Take 25 mg by mouth every evening.     aspirin 81 MG EC tablet  Commonly known as: ECOTRIN  Take 81 mg by mouth.     BIKTARVY -25 mg (25 kg or greater)  Generic drug: zclyiscoi-vfucsmjq-ujxbdqy ala  Take 1 tablet by mouth.     * blood sugar diagnostic Strp  1 strip by Other route.     * ONETOUCH VERIO TEST STRIPS Strp  Generic drug: blood sugar diagnostic     divalproex  MG 24 hr tablet  Commonly known as: DEPAKOTE ER  Take 250 mg by mouth 2 (two) times daily.     HIGH POTENCY MULTIVITAMIN 400 mcg Tab  Generic drug: multivitamin with folic acid  Take 1 tablet by mouth.     lisinopriL 5 MG tablet  Commonly known as: PRINIVIL,ZESTRIL  Take 5 mg by mouth once daily.     metFORMIN 500 MG tablet  Commonly known as: GLUCOPHAGE  metformin 500 mg tablet   Take 1 tablet twice a day by oral route.     pantoprazole 40 MG tablet  Commonly known as: PROTONIX  Please take 1 tablet by mouth every 12 hr when you have abdominal pain, then take 1 tablet every day.     TRADJENTA 5 mg Tab tablet  Generic drug: linaGLIPtin  Take 5 mg by mouth.           * This list has 2 medication(s) that are the same as other medications prescribed for you. Read the directions carefully, and ask your doctor or other care provider to review them with you.                STOP taking these medications      famotidine 20 MG tablet  Commonly known as: PEPCID     VIAGRA 100 mg tablet  Generic drug: sildenafiL              Indwelling Lines/Drains at time of discharge:   Lines/Drains/Airways       None                   Time spent on the discharge of patient: 35 minutes         Miguel Ángel Arora MD  Department of Hospital Medicine  [facility] ([place])

## 2025-04-06 ENCOUNTER — HOSPITAL ENCOUNTER (INPATIENT)
Facility: HOSPITAL | Age: 60
LOS: 1 days | Discharge: HOME | DRG: 390 | End: 2025-04-08
Attending: EMERGENCY MEDICINE | Admitting: INTERNAL MEDICINE
Payer: MEDICARE

## 2025-04-06 DIAGNOSIS — R10.10 PAIN OF UPPER ABDOMEN: ICD-10-CM

## 2025-04-06 DIAGNOSIS — R11.0 NAUSEA: ICD-10-CM

## 2025-04-06 DIAGNOSIS — K56.600 PARTIAL SMALL BOWEL OBSTRUCTION: ICD-10-CM

## 2025-04-06 DIAGNOSIS — B20 HUMAN IMMUNODEFICIENCY VIRUS (HIV) DISEASE: ICD-10-CM

## 2025-04-06 DIAGNOSIS — R19.7 DIARRHEA, UNSPECIFIED TYPE: ICD-10-CM

## 2025-04-06 DIAGNOSIS — R11.10 VOMITING, UNSPECIFIED VOMITING TYPE, UNSPECIFIED WHETHER NAUSEA PRESENT: Primary | ICD-10-CM

## 2025-04-06 PROBLEM — R11.2 NAUSEA & VOMITING: Status: ACTIVE | Noted: 2025-04-06

## 2025-04-06 LAB
ABSOLUTE EOSINOPHIL (OHS): 0.18 K/UL
ABSOLUTE MONOCYTE (OHS): 0.8 K/UL (ref 0.3–1)
ABSOLUTE NEUTROPHIL COUNT (OHS): 5.3 K/UL (ref 1.8–7.7)
ALBUMIN SERPL BCP-MCNC: 3.4 G/DL (ref 3.5–5.2)
ALP SERPL-CCNC: 68 UNIT/L (ref 40–150)
ALT SERPL W/O P-5'-P-CCNC: 46 UNIT/L (ref 10–44)
AMPHET UR QL SCN: NEGATIVE
ANION GAP (OHS): 11 MMOL/L (ref 8–16)
AST SERPL-CCNC: 34 UNIT/L (ref 11–45)
BARBITURATE SCN PRESENT UR: NEGATIVE
BASOPHILS # BLD AUTO: 0.02 K/UL
BASOPHILS NFR BLD AUTO: 0.3 %
BENZODIAZ UR QL SCN: NEGATIVE
BILIRUB SERPL-MCNC: 0.3 MG/DL (ref 0.1–1)
BILIRUB UR QL STRIP.AUTO: NEGATIVE
BIPAP: 0
BUN SERPL-MCNC: 15 MG/DL (ref 6–20)
BUN SERPL-MCNC: 21 MG/DL (ref 6–30)
CALCIUM SERPL-MCNC: 9.3 MG/DL (ref 8.7–10.5)
CANNABINOIDS UR QL SCN: ABNORMAL
CHLORIDE SERPL-SCNC: 103 MMOL/L (ref 95–110)
CHLORIDE SERPL-SCNC: 103 MMOL/L (ref 95–110)
CLARITY UR: CLEAR
CO2 SERPL-SCNC: 25 MMOL/L (ref 23–29)
COCAINE UR QL SCN: NEGATIVE
COLOR UR AUTO: YELLOW
CREAT SERPL-MCNC: 0.9 MG/DL (ref 0.5–1.4)
CREAT SERPL-MCNC: 1 MG/DL (ref 0.5–1.4)
CREAT UR-MCNC: 123 MG/DL (ref 23–375)
ERYTHROCYTE [DISTWIDTH] IN BLOOD BY AUTOMATED COUNT: 11.9 % (ref 11.5–14.5)
FIO2: 21 %
GFR SERPLBLD CREATININE-BSD FMLA CKD-EPI: >60 ML/MIN/1.73/M2
GLUCOSE SERPL-MCNC: 101 MG/DL (ref 70–110)
GLUCOSE SERPL-MCNC: 96 MG/DL (ref 70–110)
GLUCOSE UR QL STRIP: NEGATIVE
HCT VFR BLD AUTO: 45.1 % (ref 40–54)
HCT VFR BLD CALC: 48 %PCV (ref 36–54)
HGB BLD-MCNC: 15.1 GM/DL (ref 14–18)
HGB UR QL STRIP: NEGATIVE
IMM GRANULOCYTES # BLD AUTO: 0.04 K/UL (ref 0–0.04)
IMM GRANULOCYTES NFR BLD AUTO: 0.5 % (ref 0–0.5)
KETONES UR QL STRIP: NEGATIVE
LDH SERPL L TO P-CCNC: 2.6 MMOL/L
LEUKOCYTE ESTERASE UR QL STRIP: NEGATIVE
LIPASE SERPL-CCNC: 51 U/L (ref 4–60)
LYMPHOCYTES # BLD AUTO: 1.57 K/UL (ref 1–4.8)
MCH RBC QN AUTO: 31.3 PG (ref 27–31)
MCHC RBC AUTO-ENTMCNC: 33.5 G/DL (ref 32–36)
MCV RBC AUTO: 93 FL (ref 82–98)
METHADONE UR QL SCN: NEGATIVE
NITRITE UR QL STRIP: NEGATIVE
NUCLEATED RBC (/100WBC) (OHS): 0 /100 WBC
OPIATES UR QL SCN: NEGATIVE
PCP UR QL: NEGATIVE
PH UR STRIP: 7 [PH]
PLATELET # BLD AUTO: 231 K/UL (ref 150–450)
PLATELET BLD QL SMEAR: NORMAL
PMV BLD AUTO: 10.7 FL (ref 9.2–12.9)
POC IONIZED CALCIUM: 1.11 MMOL/L (ref 1.06–1.42)
POC PERFORMED BY: NORMAL
POC TCO2 (MEASURED): 27 MMOL/L (ref 23–29)
POC TEMPERATURE: 37 C
POTASSIUM BLD-SCNC: 4.7 MMOL/L (ref 3.5–5.1)
POTASSIUM SERPL-SCNC: 4.4 MMOL/L (ref 3.5–5.1)
PROT SERPL-MCNC: 7.8 GM/DL (ref 6–8.4)
PROT UR QL STRIP: ABNORMAL
RBC # BLD AUTO: 4.83 M/UL (ref 4.6–6.2)
RELATIVE EOSINOPHIL (OHS): 2.3 %
RELATIVE LYMPHOCYTE (OHS): 19.8 % (ref 18–48)
RELATIVE MONOCYTE (OHS): 10.1 % (ref 4–15)
RELATIVE NEUTROPHIL (OHS): 67 % (ref 38–73)
SAMPLE: NORMAL
SODIUM BLD-SCNC: 139 MMOL/L (ref 136–145)
SODIUM SERPL-SCNC: 139 MMOL/L (ref 136–145)
SP GR UR STRIP: 1.02
SPECIMEN SOURCE: NORMAL
UROBILINOGEN UR STRIP-ACNC: NEGATIVE EU/DL
WBC # BLD AUTO: 7.91 K/UL (ref 3.9–12.7)

## 2025-04-06 PROCEDURE — 96376 TX/PRO/DX INJ SAME DRUG ADON: CPT

## 2025-04-06 PROCEDURE — 93010 ELECTROCARDIOGRAM REPORT: CPT | Mod: ,,, | Performed by: INTERNAL MEDICINE

## 2025-04-06 PROCEDURE — 82803 BLOOD GASES ANY COMBINATION: CPT

## 2025-04-06 PROCEDURE — G0378 HOSPITAL OBSERVATION PER HR: HCPCS

## 2025-04-06 PROCEDURE — 96374 THER/PROPH/DIAG INJ IV PUSH: CPT

## 2025-04-06 PROCEDURE — 87324 CLOSTRIDIUM AG IA: CPT

## 2025-04-06 PROCEDURE — 99285 EMERGENCY DEPT VISIT HI MDM: CPT | Mod: 25

## 2025-04-06 PROCEDURE — 96361 HYDRATE IV INFUSION ADD-ON: CPT

## 2025-04-06 PROCEDURE — 81003 URINALYSIS AUTO W/O SCOPE: CPT | Mod: 59 | Performed by: STUDENT IN AN ORGANIZED HEALTH CARE EDUCATION/TRAINING PROGRAM

## 2025-04-06 PROCEDURE — 85025 COMPLETE CBC W/AUTO DIFF WBC: CPT | Performed by: STUDENT IN AN ORGANIZED HEALTH CARE EDUCATION/TRAINING PROGRAM

## 2025-04-06 PROCEDURE — 93005 ELECTROCARDIOGRAM TRACING: CPT

## 2025-04-06 PROCEDURE — 83690 ASSAY OF LIPASE: CPT | Performed by: STUDENT IN AN ORGANIZED HEALTH CARE EDUCATION/TRAINING PROGRAM

## 2025-04-06 PROCEDURE — 83605 ASSAY OF LACTIC ACID: CPT

## 2025-04-06 PROCEDURE — 99900035 HC TECH TIME PER 15 MIN (STAT)

## 2025-04-06 PROCEDURE — 96375 TX/PRO/DX INJ NEW DRUG ADDON: CPT

## 2025-04-06 PROCEDURE — 80053 COMPREHEN METABOLIC PANEL: CPT | Performed by: STUDENT IN AN ORGANIZED HEALTH CARE EDUCATION/TRAINING PROGRAM

## 2025-04-06 PROCEDURE — 87046 STOOL CULTR AEROBIC BACT EA: CPT | Mod: 91

## 2025-04-06 PROCEDURE — 63600175 PHARM REV CODE 636 W HCPCS: Performed by: STUDENT IN AN ORGANIZED HEALTH CARE EDUCATION/TRAINING PROGRAM

## 2025-04-06 PROCEDURE — 87427 SHIGA-LIKE TOXIN AG IA: CPT | Mod: 59

## 2025-04-06 PROCEDURE — 80307 DRUG TEST PRSMV CHEM ANLYZR: CPT | Performed by: STUDENT IN AN ORGANIZED HEALTH CARE EDUCATION/TRAINING PROGRAM

## 2025-04-06 PROCEDURE — 25500020 PHARM REV CODE 255: Performed by: EMERGENCY MEDICINE

## 2025-04-06 PROCEDURE — 80047 BASIC METABLC PNL IONIZED CA: CPT

## 2025-04-06 RX ORDER — IBUPROFEN 200 MG
1 TABLET ORAL DAILY
Status: DISCONTINUED | OUTPATIENT
Start: 2025-04-07 | End: 2025-04-08 | Stop reason: HOSPADM

## 2025-04-06 RX ORDER — MORPHINE SULFATE 4 MG/ML
4 INJECTION, SOLUTION INTRAMUSCULAR; INTRAVENOUS
Refills: 0 | Status: COMPLETED | OUTPATIENT
Start: 2025-04-06 | End: 2025-04-06

## 2025-04-06 RX ORDER — ASPIRIN 81 MG/1
81 TABLET ORAL DAILY
Status: DISCONTINUED | OUTPATIENT
Start: 2025-04-07 | End: 2025-04-08 | Stop reason: HOSPADM

## 2025-04-06 RX ORDER — PANTOPRAZOLE SODIUM 40 MG/10ML
40 INJECTION, POWDER, LYOPHILIZED, FOR SOLUTION INTRAVENOUS DAILY
Status: DISCONTINUED | OUTPATIENT
Start: 2025-04-06 | End: 2025-04-08

## 2025-04-06 RX ORDER — ALUMINUM HYDROXIDE, MAGNESIUM HYDROXIDE, AND SIMETHICONE 1200; 120; 1200 MG/30ML; MG/30ML; MG/30ML
30 SUSPENSION ORAL
Status: DISCONTINUED | OUTPATIENT
Start: 2025-04-06 | End: 2025-04-08 | Stop reason: HOSPADM

## 2025-04-06 RX ORDER — ONDANSETRON HYDROCHLORIDE 2 MG/ML
4 INJECTION, SOLUTION INTRAVENOUS EVERY 6 HOURS PRN
Status: DISCONTINUED | OUTPATIENT
Start: 2025-04-07 | End: 2025-04-08 | Stop reason: HOSPADM

## 2025-04-06 RX ORDER — LABETALOL HCL 20 MG/4 ML
10 SYRINGE (ML) INTRAVENOUS EVERY 6 HOURS PRN
Status: DISCONTINUED | OUTPATIENT
Start: 2025-04-06 | End: 2025-04-08 | Stop reason: HOSPADM

## 2025-04-06 RX ORDER — AMITRIPTYLINE HYDROCHLORIDE 25 MG/1
25 TABLET, FILM COATED ORAL NIGHTLY
Status: DISCONTINUED | OUTPATIENT
Start: 2025-04-07 | End: 2025-04-08 | Stop reason: HOSPADM

## 2025-04-06 RX ORDER — ONDANSETRON HYDROCHLORIDE 2 MG/ML
4 INJECTION, SOLUTION INTRAVENOUS
Status: COMPLETED | OUTPATIENT
Start: 2025-04-06 | End: 2025-04-06

## 2025-04-06 RX ORDER — DIVALPROEX SODIUM 250 MG/1
250 TABLET, FILM COATED, EXTENDED RELEASE ORAL 2 TIMES DAILY
Status: DISCONTINUED | OUTPATIENT
Start: 2025-04-07 | End: 2025-04-08 | Stop reason: HOSPADM

## 2025-04-06 RX ORDER — ALUMINUM HYDROXIDE, MAGNESIUM HYDROXIDE, AND SIMETHICONE 1200; 120; 1200 MG/30ML; MG/30ML; MG/30ML
30 SUSPENSION ORAL
Status: DISCONTINUED | OUTPATIENT
Start: 2025-04-07 | End: 2025-04-06

## 2025-04-06 RX ORDER — PANTOPRAZOLE SODIUM 40 MG/10ML
40 INJECTION, POWDER, LYOPHILIZED, FOR SOLUTION INTRAVENOUS DAILY
Status: DISCONTINUED | OUTPATIENT
Start: 2025-04-07 | End: 2025-04-06

## 2025-04-06 RX ORDER — LISINOPRIL 5 MG/1
5 TABLET ORAL DAILY
Status: DISCONTINUED | OUTPATIENT
Start: 2025-04-07 | End: 2025-04-08 | Stop reason: HOSPADM

## 2025-04-06 RX ADMIN — SODIUM CHLORIDE, POTASSIUM CHLORIDE, SODIUM LACTATE AND CALCIUM CHLORIDE 1000 ML: 600; 310; 30; 20 INJECTION, SOLUTION INTRAVENOUS at 07:04

## 2025-04-06 RX ADMIN — MORPHINE SULFATE 4 MG: 4 INJECTION INTRAVENOUS at 10:04

## 2025-04-06 RX ADMIN — ONDANSETRON 4 MG: 2 INJECTION INTRAMUSCULAR; INTRAVENOUS at 10:04

## 2025-04-06 RX ADMIN — IOHEXOL 100 ML: 350 INJECTION, SOLUTION INTRAVENOUS at 08:04

## 2025-04-06 RX ADMIN — ONDANSETRON 4 MG: 2 INJECTION INTRAMUSCULAR; INTRAVENOUS at 07:04

## 2025-04-06 RX ADMIN — MORPHINE SULFATE 4 MG: 4 INJECTION INTRAVENOUS at 07:04

## 2025-04-06 NOTE — ED PROVIDER NOTES
Encounter Date: 4/6/2025       History     Chief Complaint   Patient presents with    Nausea    Vomiting    Diarrhea     Pt reports symptoms started earlier this week. Pt went to [facility] and was discharged. Pt stays at [facility]. Pt complains of N/V/D     HPI    59-year-old male with past medical history of HIV, diabetes, hypertension, presenting for repeat presentation of nausea, vomiting, diarrhea.    Patient reports 10/10 periumbilical and epigastric abdominal pain.  His pain on discharge from the hospital yesterday was 6/10.  He reports multiple episodes, 3 total of nonbloody nonbilious emesis.  He has also had watery diarrhea.  He reports he is still passing flatus.  He has associated chills but denies fever.  He also has associated shortness of breath secondary to pain.  He denies chest pain, polyuria, dysuria.  He denies polysubstance abuse since discharge.    Patient was discharged yesterday for a small bowel obstruction he presented to the ED on 04/03/2025, CT concerning for small bowel obstruction.  He was treated with IV pain medications and supportive care, nonoperative management was advised, and he was discharged.  Hospital team suspected abdominal pain possibly related to mesenteric ischemia related to cocaine abuse.    Review of patient's allergies indicates:   Allergen Reactions    Duloxetine     Lithium analogues      Past Medical History:   Diagnosis Date    Diabetes mellitus     HIV (human immunodeficiency virus infection) 1998    Hypertension      History reviewed. No pertinent surgical history.  No family history on file.  Social History[1]  Review of Systems  See HPI for pertinent ROS.   Physical Exam     Initial Vitals [04/06/25 1826]   BP Pulse Resp Temp SpO2   (!) 165/93 77 18 98.3 °F (36.8 °C) 97 %      MAP       --         Physical Exam    Constitutional: He appears well-developed and well-nourished.   Not acutely ill but appears to feel unwell   HENT:   Head: Normocephalic  and atraumatic.   Eyes: Pupils are equal, round, and reactive to light. No scleral icterus.   Neck: No JVD present.   Normal range of motion.  Cardiovascular:  Normal rate and regular rhythm.     Exam reveals no gallop and no friction rub.       No murmur heard.  Pulmonary/Chest: Breath sounds normal. No stridor. He has no wheezes. He has no rhonchi. He has no rales.   Abdominal: Abdomen is soft. There is abdominal tenderness (TTP generalized, worse in the epigastrium and periumbilical). There is no rebound and no guarding.   Musculoskeletal:         General: No tenderness or edema.      Cervical back: Normal range of motion.     Neurological: He is alert. GCS score is 15. GCS eye subscore is 4. GCS verbal subscore is 5. GCS motor subscore is 6.   Skin: Skin is warm. Capillary refill takes less than 2 seconds.   Psychiatric: He has a normal mood and affect.         ED Course   Procedures  Labs Reviewed   COMPREHENSIVE METABOLIC PANEL - Abnormal       Result Value    Sodium 139      Potassium 4.4      Chloride 103      CO2 25      Glucose 96      BUN 15      Creatinine 0.9      Calcium 9.3      Protein Total 7.8      Albumin 3.4 (*)     Bilirubin Total 0.3      ALP 68      AST 34      ALT 46 (*)     Anion Gap 11      eGFR >60     URINALYSIS, REFLEX TO URINE CULTURE - Abnormal    Color, UA Yellow      Appearance, UA Clear      pH, UA 7.0      Spec Grav UA 1.020      Protein, UA Trace (*)     Glucose, UA Negative      Ketones, UA Negative      Bilirubin, UA Negative      Blood, UA Negative      Nitrites, UA Negative      Urobilinogen, UA Negative      Leukocyte Esterase, UA Negative     DRUG SCREEN PANEL, URINE EMERGENCY - Abnormal    Benzodiazepine, Urine Negative      Methadone, Urine Negative      Cocaine, Urine Negative      Opiates, Urine Negative      Barbiturates, Urine Negative      Amphetamines, Urine Negative      THC Presumptive Positive (*)     Phencyclidine, Urine Negative      Urine Creatinine 123.0       "Narrative:     This screen includes the following classes of drugs at the listed cut-off:     Benzodiazepines:        200 ng/ml   Methadone:              300 ng/ml   Cocaine metabolite:     300 ng/ml   Opiates:                300 ng/ml   Barbiturates:           200 ng/ml   Amphetamines:           1000 ng/ml   Marijuana metabs (THC): 50 ng/ml   Phencyclidine (PCP):    25 ng/ml     This is a screening test. If results do not correlate with clinical presentation, then a confirmatory send out test is advised.    This report is intended for use in clinical monitoring and management of patients. It is not intended for use in employment related drug testing."   CBC WITH DIFFERENTIAL - Abnormal    WBC 7.91      RBC 4.83      HGB 15.1      HCT 45.1      MCV 93      MCH 31.3 (*)     MCHC 33.5      RDW 11.9      Platelet Count 231      MPV 10.7      Nucleated RBC 0      Neut % 67.0      Lymph % 19.8      Mono % 10.1      Eos % 2.3      Basophil % 0.3      Imm Grans % 0.5      Neut # 5.30      Lymph # 1.57      Mono # 0.80      Eos # 0.18      Baso # 0.02      Imm Grans # 0.04     LIPASE - Normal    Lipase Level 51     PLATELET REVIEW - Normal    Platelet Estimate Appears Normal     CLOSTRIDIUM DIFFICILE   CBC W/ AUTO DIFFERENTIAL    Narrative:     The following orders were created for panel order CBC W/ AUTO DIFFERENTIAL.  Procedure                               Abnormality         Status                     ---------                               -----------         ------                     CBC with Differential[[id number]]       Abnormal            Final result                 Please view results for these tests on the individual orders.   PLATELET REVIEW   ISTAT PROCEDURE    POC Glucose 101      POC BUN 21      POC Creatinine 1.0      POC Sodium 139      POC Potassium 4.7      POC Chloride 103      POC TCO2 (MEASURED) 27      POC Ionized Calcium 1.11      POC Hematocrit 48      Sample TREVOR     ISTAT CHEM8          Imaging " Results              CTA Abdomen and Pelvis (Final result)  Result time 04/06/25 21:37:39   Procedure changed from CTA Abdomen     Final result by Miguel Matson DO (04/06/25 21:37:39)                   Impression:      1. No evidence of acute mesenteric arterial thrombosis or stenosis.  2. Continued mildly prominent loops of small bowel, similar to the prior study, findings may be due to partial small bowel obstruction versus enteritis.  3. Prostatomegaly.      Electronically signed by: Miguel Matson  Date:    04/06/2025  Time:    21:37               Narrative:    EXAMINATION:  CTA ABDOMEN AND PELVIS    CLINICAL HISTORY:  Renal ischemia or infarction;Concern for mesenteric ischemia elevated lactate, recent discharge with concern for mesenteric ischemia.;    TECHNIQUE:  Axial CT images with sagittal and coronal reformats were obtained of the abdomen and pelvis from the hemidiaphragms through the symphysis pubis after the administration of 100mL Omnipaque 350. arterial phase and portal venous phase were performed.    COMPARISON:  CT of the abdomen and pelvis from 04/03/2025.    FINDINGS:  Vasculature: Appropriate contrast bolus timing.  There is no evidence of an aortic aneurysm or dissection.  There is mild atherosclerosis of the infrarenal abdominal aorta.  The celiac artery, SMA, and ORVILLE are patent throughout, without evidence of stenosis, thrombosis, or occlusion.  There are single renal arteries bilaterally which are patent, without stenosis, thrombosis, or occlusion.  The bilateral common, internal, and external iliac arteries demonstrate mild atherosclerosis and are otherwise unremarkable.    Lung Bases: Clear.    Heart: Heart size is normal.  No pericardial effusion.    Liver: The liver is normal in size and demonstrates homogeneous enhancement without focal lesion.  The portal vasculature is patent.    Biliary tract: No intrahepatic or extrahepatic biliary ductal dilatation.    Gallbladder: No radiodense  gallstone. No wall thickening or pericholecystic fluid.    Pancreas: Normal. No pancreatic ductal dilatation.    Spleen: Normal size without focal lesion.    Adrenals: Unremarkable.    Kidneys and urinary collecting systems: Normal.  No hydronephrosis or urolithiasis.    Lymph nodes: None enlarged.    Stomach and bowel: The stomach is normal.  Continued mildly prominent loops of small bowel, similar to the prior study.  Previously seen transition point is no longer well identified.  There is no bowel wall thickening.  The appendix is normal.    Peritoneum and mesentery: No ascites or free intraperitoneal air.  No abdominal fluid collection.    Urinary bladder: No wall thickening.    Reproductive organs: The prostate is enlarged.    Body wall: No abnormality.    Musculoskeletal: No aggressive osseous lesion.  Degenerative changes of the spine are noted.                                       Medications   lactated ringers bolus 1,000 mL (0 mLs Intravenous Stopped 4/6/25 2039)   ondansetron injection 4 mg (4 mg Intravenous Given 4/6/25 1933)   morphine injection 4 mg (4 mg Intravenous Given 4/6/25 1935)   iohexoL (OMNIPAQUE 350) injection 100 mL (100 mLs Intravenous Given 4/6/25 2055)   morphine injection 4 mg (4 mg Intravenous Given 4/6/25 2231)   ondansetron injection 4 mg (4 mg Intravenous Given 4/6/25 2229)     Medical Decision Making  Amount and/or Complexity of Data Reviewed  Labs: ordered. Decision-making details documented in ED Course.  Radiology: ordered. Decision-making details documented in ED Course.    Risk  Prescription drug management.               ED Course as of 04/06/25 2306   Sun Apr 06, 2025   1930 Urinalysis, Reflex to Urine Culture(!)  UTI less likely  [KB]   1974 CTA Abdomen and Pelvis    1. No evidence of acute mesenteric arterial thrombosis or stenosis.  2. Continued mildly prominent loops of small bowel, similar to the prior study, findings may be due to partial small bowel obstruction  versus enteritis.  3. Prostatomegaly.      [KB]      ED Course User Index  [KB] Carmenza Russell MD                       EKG, normal sinus rhythm, PACs present, rate 83, left axis deviation, no QTC prolongation or ST segment elevation.    59-year-old male with history of HIV, diabetes, hypertension, presenting for continued abdominal pain to since discharge yesterday for medical management of small-bowel obstruction.  On arrival, vital signs are stable, he is afebrile and saturating well on room air.  He is in acute pain on arrival, 10/10, tenderness to palpation in the periumbilical region and epigastrium.  Lactate elevated at 2.6, concerned for mesenteric ischemia.  CTA abdomen ordered for further evaluation.  Partial bowel obstruction again seen.  Patient has no leukocytosis, no anemia, no thrombocytopenia.  No evidence of SURJIT significant electrolyte abnormalities or hepatobiliary obstruction.  Patient was given 2 rounds of IV morphine and IV Zofran for symptoms.  Consulted with General surgery who agreed to see the patient.  Plan for admission to either General surgery versus hospital medicine pending General surgery recs.  Patient is signed out pending the above.        Clinical Impression:  Final diagnoses:  [R11.0] Nausea  [R11.10] Vomiting, unspecified vomiting type, unspecified whether nausea present (Primary)  [R19.7] Diarrhea, unspecified type  [R10.10] Pain of upper abdomen  [K56.600] Partial small bowel obstruction          ED Disposition Condition    Observation Stable                  [1]   Social History  Tobacco Use    Smoking status: Every Day     Current packs/day: 1.00     Types: Cigarettes    Smokeless tobacco: Never   Substance Use Topics    Alcohol use: Not Currently     Comment: OCC    Drug use: Not Currently        Carmenza Russell MD  Resident  04/06/25 9937

## 2025-04-06 NOTE — ED NOTES
I-STAT Chem-8+ Results:   Value Reference Range   Sodium 139 136-145 mmol/L   Potassium  4.7 3.5-5.1 mmol/L   Chloride 103  mmol/L   Ionized Calcium 1.11 1.06-1.42 mmol/L   CO2 (measured) 27 23-29 mmol/L   Glucose 101  mg/dL   BUN 21 6-30 mg/dL   Creatinine 1.0 0.5-1.4 mg/dL   Hematocrit 48 36-54%

## 2025-04-07 LAB
C DIFF GDH STL QL: NEGATIVE
C DIFF TOX A+B STL QL IA: NEGATIVE
CD3+CD4+ CELLS # SPEC: 716 CELLS/UL (ref 300–1400)
CD3+CD4+ CELLS NFR BLD: 30.2 % (ref 28–57)
LABORATORY COMMENT REPORT: NORMAL
OHS QRS DURATION: 84 MS
OHS QTC CALCULATION: 399 MS

## 2025-04-07 PROCEDURE — 63600175 PHARM REV CODE 636 W HCPCS

## 2025-04-07 PROCEDURE — 25000003 PHARM REV CODE 250: Performed by: INTERNAL MEDICINE

## 2025-04-07 PROCEDURE — 96376 TX/PRO/DX INJ SAME DRUG ADON: CPT

## 2025-04-07 PROCEDURE — 25000003 PHARM REV CODE 250

## 2025-04-07 PROCEDURE — G0378 HOSPITAL OBSERVATION PER HR: HCPCS

## 2025-04-07 PROCEDURE — S4991 NICOTINE PATCH NONLEGEND: HCPCS

## 2025-04-07 PROCEDURE — 96375 TX/PRO/DX INJ NEW DRUG ADDON: CPT

## 2025-04-07 RX ORDER — ALBUTEROL SULFATE 90 UG/1
1 INHALANT RESPIRATORY (INHALATION) EVERY 4 HOURS PRN
Status: ON HOLD | COMMUNITY
End: 2025-04-07

## 2025-04-07 RX ORDER — ALPRAZOLAM 2 MG/1
2 TABLET ORAL 2 TIMES DAILY PRN
Status: ON HOLD | COMMUNITY
End: 2025-04-07

## 2025-04-07 RX ORDER — ACETAMINOPHEN 500 MG
1000 TABLET ORAL EVERY 6 HOURS PRN
Status: DISCONTINUED | OUTPATIENT
Start: 2025-04-07 | End: 2025-04-08 | Stop reason: HOSPADM

## 2025-04-07 RX ADMIN — DIVALPROEX SODIUM 250 MG: 250 TABLET, EXTENDED RELEASE ORAL at 10:04

## 2025-04-07 RX ADMIN — DIVALPROEX SODIUM 250 MG: 250 TABLET, EXTENDED RELEASE ORAL at 08:04

## 2025-04-07 RX ADMIN — Medication 1 PATCH: at 08:04

## 2025-04-07 RX ADMIN — ASPIRIN 81 MG: 81 TABLET, COATED ORAL at 08:04

## 2025-04-07 RX ADMIN — BICTEGRAVIR SODIUM, EMTRICITABINE, AND TENOFOVIR ALAFENAMIDE FUMARATE 1 TABLET: 50; 200; 25 TABLET ORAL at 08:04

## 2025-04-07 RX ADMIN — PANTOPRAZOLE SODIUM 40 MG: 40 INJECTION, POWDER, FOR SOLUTION INTRAVENOUS at 08:04

## 2025-04-07 RX ADMIN — ALUMINUM HYDROXIDE, MAGNESIUM HYDROXIDE, AND SIMETHICONE 30 ML: 200; 200; 20 SUSPENSION ORAL at 11:04

## 2025-04-07 RX ADMIN — AMITRIPTYLINE HYDROCHLORIDE 25 MG: 25 TABLET, FILM COATED ORAL at 10:04

## 2025-04-07 RX ADMIN — LISINOPRIL 5 MG: 5 TABLET ORAL at 08:04

## 2025-04-07 RX ADMIN — PANTOPRAZOLE SODIUM 40 MG: 40 INJECTION, POWDER, FOR SOLUTION INTRAVENOUS at 12:04

## 2025-04-07 RX ADMIN — ALUMINUM HYDROXIDE, MAGNESIUM HYDROXIDE, AND SIMETHICONE 30 ML: 200; 200; 20 SUSPENSION ORAL at 12:04

## 2025-04-07 RX ADMIN — ACETAMINOPHEN 1000 MG: 500 TABLET ORAL at 06:04

## 2025-04-07 NOTE — ASSESSMENT & PLAN NOTE
Noted on CT AP on admission with enteritis also on the differential. Pt having BM, so low concern for obstruction. Review of limited A1C records at [facility] do not show hx of uncontrolled T2DM, but HIV-associated neuropathy remains on the differential. Pt's T2DM regimen is another possible contributory factor, but pt has been on regimen for at least the past 2 years. Advised on prior admissions to avoid cocaine use with concern for bowel ischemia and pt reports adherence for past week.     - IV PPI and Mylanta for abdominal pain  - antiemetics prn  - advance diet as tolerated, started on CLD at admission since pt reporting thirst  - f/u C Diff, anti motility agent if C Diff negative, f/u stool cultures

## 2025-04-07 NOTE — H&P
[facility] - Emergency Dept  Hospital Medicine  History & Physical    Patient Name: Ang Loya  MRN: [id number]  Patient Class: OP- Observation  Admission Date: 4/6/2025  Attending Physician: Reji Kwon,*   Primary Care Provider: [facility]         Patient information was obtained from patient, past medical records, and ER records.     Subjective:     Principal Problem:Partial small bowel obstruction    Chief Complaint:   Chief Complaint   Patient presents with    Nausea    Vomiting    Diarrhea     Pt reports symptoms started earlier this week. Pt went to [facility] and was discharged. Pt stays at [facility]. Pt complains of N/V/D        HPI: Mr. Loya is a 59 year old M with of DM (A1C 5.8), HTN, current tobacco use, and HIV presented to the ED with complaints of acute on chronic abdominal pain and loose stools. His diffuse abdominal pain worsened over the past 2-3 days with intermittent nausea and vomiting and he has been unable to tolerate anything solid since he last had cabbage and rice 1 week ago. He is able to drink water and fluids. He has had loose stools also for at least a week. Nothing makes the pain/stools better or worse. Since then his only symptoms have been diarrhea and abdominal pain. Patient denies bloody stools, fever, or urinary symptoms. Patient currently resides at [facility] and denies any sick contacts. Last cocaine and marijuana use was one week ago prior to hospitalization at [facility]. No ETOH use. He reports adherence with Biktarvy. [place] [facility] [place] diffuse mildly erythematous mucosa without bleeding in the gastric antrum. Has not had a chance to fill Protonix recommended at his last hospitalization.     In the ED he was afebrile, hemodynamically stable on RA without significant lab abnormalities. Marijuana positive on UDS. CTA AP with no evidence of acute mesenteric arterial thrombosis or stenosis, continued  mildly prominent loops of small bowel, similar to the prior study, findings may be due to partial small bowel obstruction versus enteritis. General Surgery evaluated and determined low concern for SBO with no need for NGT. Admitted to Hospital Medicine for N/V/D workup.     Past Medical History:   Diagnosis Date    Diabetes mellitus     HIV (human immunodeficiency virus infection) 1998    Hypertension        History reviewed. No pertinent surgical history.    Review of patient's allergies indicates:   Allergen Reactions    Duloxetine     Lithium analogues        No current facility-administered medications on file prior to encounter.     Current Outpatient Medications on File Prior to Encounter   Medication Sig    amitriptyline (ELAVIL) 25 MG tablet Take 25 mg by mouth every evening.    aspirin (ECOTRIN) 81 MG EC tablet Take 81 mg by mouth.    BIKTARVY -25 mg (25 kg or greater) Take 1 tablet by mouth.    blood sugar diagnostic Strp 1 strip by Other route.    divalproex ER (DEPAKOTE ER) 250 MG 24 hr tablet Take 250 mg by mouth 2 (two) times daily.    HIGH POTENCY MULTIVITAMIN 400 mcg Tab Take 1 tablet by mouth.    lisinopriL (PRINIVIL,ZESTRIL) 5 MG tablet Take 5 mg by mouth once daily.    magnesium oxide (MAG-OX) 400 mg (241.3 mg magnesium) tablet Take 1 tablet (400 mg total) by mouth once daily.    metFORMIN (GLUCOPHAGE) 500 MG tablet metformin 500 mg tablet   Take 1 tablet twice a day by oral route.    nicotine (NICODERM CQ) 21 mg/24 hr Place 1 patch onto the skin once daily.    ONETOUCH VERIO Strp     pantoprazole (PROTONIX) 40 MG tablet Please take 1 tablet by mouth every 12 hr when you have abdominal pain, then take 1 tablet every day.    TRADJENTA 5 mg Tab tablet Take 5 mg by mouth.     Family History    None       Tobacco Use    Smoking status: Every Day     Current packs/day: 1.00     Types: Cigarettes    Smokeless tobacco: Never   Substance and Sexual Activity    Alcohol use: Not Currently     Comment:  OCC    Drug use: Not Currently    Sexual activity: Not Currently     Review of Systems   Constitutional:  Positive for appetite change. Negative for activity change and fever.   Respiratory:  Negative for cough, shortness of breath and wheezing.    Cardiovascular:  Negative for chest pain and leg swelling.   Gastrointestinal:  Positive for diarrhea and nausea. Negative for abdominal pain, constipation and vomiting.   Skin:  Negative for rash.   Neurological:  Negative for headaches.        LE neuropathy     Objective:     Vital Signs (Most Recent):  Temp: 98 °F (36.7 °C) (04/06/25 2235)  Pulse: 75 (04/06/25 2354)  Resp: 20 (04/06/25 2354)  BP: (!) 156/81 (04/06/25 2344)  SpO2: 96 % (04/06/25 2354) Vital Signs (24h Range):  Temp:  [98 °F (36.7 °C)-98.4 °F (36.9 °C)] 98 °F (36.7 °C)  Pulse:  [70-77] 75  Resp:  [15-20] 20  SpO2:  [94 %-100 %] 96 %  BP: (136-182)/(70-95) 156/81     Weight: 80.7 kg (178 lb)  Body mass index is 24.83 kg/m².     Physical Exam  Vitals and nursing note reviewed.   HENT:      Head: Normocephalic and atraumatic.   Eyes:      Extraocular Movements: Extraocular movements intact.      Conjunctiva/sclera: Conjunctivae normal.   Cardiovascular:      Rate and Rhythm: Normal rate and regular rhythm.      Pulses: Normal pulses.   Pulmonary:      Effort: Pulmonary effort is normal. No respiratory distress.      Breath sounds: No wheezing or rales.   Abdominal:      General: Bowel sounds are increased. There is no distension.      Palpations: Abdomen is soft.      Tenderness: There is no abdominal tenderness. There is no guarding.   Musculoskeletal:         General: Normal range of motion.      Cervical back: Normal range of motion.      Right lower leg: No edema.      Left lower leg: No edema.   Skin:     General: Skin is warm and dry.   Neurological:      General: No focal deficit present.      Mental Status: He is alert and oriented to person, place, and time.   Psychiatric:         Mood and Affect:  "Mood normal.                Significant Labs: All pertinent labs within the past 24 hours have been reviewed.    Significant Imaging: I have reviewed all pertinent imaging results/findings within the past 24 hours.  Assessment/Plan:     Assessment & Plan  Partial small bowel obstruction  Nausea & vomiting  Noted on CT AP on admission with enteritis also on the differential. Pt having BM, so low concern for obstruction. Review of limited A1C records at [facility] do not show hx of uncontrolled T2DM, but HIV-associated neuropathy remains on the differential. Pt's T2DM regimen is another possible contributory factor, but pt has been on regimen for at least the past 2 years. Advised on prior admissions to avoid cocaine use with concern for bowel ischemia and pt reports adherence for past week.     - IV PPI and Mylanta for abdominal pain  - antiemetics prn  - advance diet as tolerated, started on CLD at admission since pt reporting thirst  - f/u C Diff, anti motility agent if C Diff negative, f/u stool cultures                                                                                      Human immunodeficiency virus (HIV) disease  - Continue Biktarvy  - f/u CD4 count ordered 04/04 at [facility] to rule out loose stools associated with opportunistic infection    Type 2 diabetes mellitus  Patient's FSGs are controlled on current medication regimen.  Last A1c reviewed-   Lab Results   Component Value Date    HGBA1C 5.8 (H) 04/03/2025     Most recent fingerstick glucose reviewed- No results for input(s): "POCTGLUCOSE" in the last 24 hours.  Current correctional scale   n/a  Maintain anti-hyperglycemic dose as follows-   Antihyperglycemics (From admission, onward)      None          Hold Oral hypoglycemics while patient is in the hospital.  Peripheral neuropathy  Resume home gabapentin, Lul'ed at last hospitalization it seems in error. Pt reports daily use 800mg TID with medical record review showing recent use.    Tobacco " use  - Nicotine patch ordered    Substance induced mood disorder  Pt with hx of polysubstance abuse    Hypertension  Patient's blood pressure range in the last 24 hours was: BP  Min: 136/89  Max: 182/94.The patient's inpatient anti-hypertensive regimen is listed below:  Current Antihypertensives  lisinopriL tablet 5 mg, Daily, Oral  labetalol 20 mg/4 mL (5 mg/mL) IV syring, Every 6 hours PRN, Intravenous    Plan  - BP is uncontrolled, will adjust as follows: add home meds after able to tolerate po  VTE Risk Mitigation (From admission, onward)      None               On 04/07/2025, patient should be placed in hospital observation services under my care.             Mgiuel Ángel mSith MD  Department of Hospital Medicine  [facility] - Emergency Dept

## 2025-04-07 NOTE — ASSESSMENT & PLAN NOTE
"Patient's FSGs are controlled on current medication regimen.  Last A1c reviewed-   Lab Results   Component Value Date    HGBA1C 5.8 (H) 04/03/2025     Most recent fingerstick glucose reviewed- No results for input(s): "POCTGLUCOSE" in the last 24 hours.  Current correctional scale  n/a  Maintain anti-hyperglycemic dose as follows-   Antihyperglycemics (From admission, onward)      None          Hold Oral hypoglycemics while patient is in the hospital.  "

## 2025-04-07 NOTE — ED NOTES
Telemetry Verification   Patient placed on Telemetry Box  Verified with War Room  Box # [id number]   Monitor Tech     Rate     Rhythm

## 2025-04-07 NOTE — ED NOTES
Pt care assumed. Report received by CARROL noble. Pt lying in stretcher in low and locked position and side rails raised x2. Call light, pt's belongings, and bedside table within pt's reach. Pt on continuous cardiac monitoring, pulse oximetry, and BP cycling every 30 minutes. Pt in NAD and verbalized no needs at this time.

## 2025-04-07 NOTE — PROVIDER PROGRESS NOTES - EMERGENCY DEPT.
"ED Resident HAND-OFF NOTE:  Ang Loya is a 59 y.o. male who presented to the ED on 4/7/2025, patient C/O nausea, vomiting, diarrhea. I assumed care of patient from off-going ED physician team patient pending General surgery recommendations.    Briefly, this was a 59-year-old male who was recently admitted to [facility] for bowel obstruction.  This was managed conservatively.  CTA showing partial bowel obstruction.  Previous team discussed with general surgery who agreed to evaluate patient.    On my evaluation, Ang Loya appears well, hemodynamically stable and in NAD. Thus far, Ang Loya has received:  Medications   aspirin EC tablet 81 mg (has no administration in time range)   etyqjhdcy-ukdnzbrw-nlazqmz ala -25 mg (25 kg or greater) 1 tablet (has no administration in time range)   lisinopriL tablet 5 mg (has no administration in time range)   nicotine 21 mg/24 hr 1 patch (has no administration in time range)   divalproex ER 24 hr tablet 250 mg (has no administration in time range)   amitriptyline tablet 25 mg (has no administration in time range)   labetalol 20 mg/4 mL (5 mg/mL) IV syring (has no administration in time range)   pantoprazole injection 40 mg (40 mg Intravenous Given 4/7/25 0002)   aluminum-magnesium hydroxide-simethicone 200-200-20 mg/5 mL suspension 30 mL (30 mLs Oral Given 4/7/25 0002)   ondansetron injection 4 mg (has no administration in time range)   lactated ringers bolus 1,000 mL (0 mLs Intravenous Stopped 4/6/25 2039)   ondansetron injection 4 mg (4 mg Intravenous Given 4/6/25 1933)   morphine injection 4 mg (4 mg Intravenous Given 4/6/25 1935)   iohexoL (OMNIPAQUE 350) injection 100 mL (100 mLs Intravenous Given 4/6/25 2055)   morphine injection 4 mg (4 mg Intravenous Given 4/6/25 2231)   ondansetron injection 4 mg (4 mg Intravenous Given 4/6/25 2229)       /65   Pulse (!) 112   Temp 98 °F (36.7 °C) (Oral)   Resp 16   Ht 5' 11" (1.803 m)   Wt 80.7 kg (178 lb)   SpO2 " 96%   BMI 24.83 kg/m²         Disposition: I anticipate patient will be admission  ______________________  Elana Davies MD   Emergency Medicine Resident      UPDATE:     Per general surgery, low suspicion for complete bowel obstruction.  Recommends admission to Hospital Medicine for further diarrhea and nausea/vomiting workup.    Discussed patient's case with Hospital Medicine who agreed to admit patient to their service intractable nausea/vomiting, serial abdominal exams.      :  Nausea  Vomiting, unspecified vomiting type, unspecified whether nausea present (Primary)  Diarrhea, unspecified type  Pain of upper abdomen  Partial small bowel obstruction

## 2025-04-07 NOTE — ASSESSMENT & PLAN NOTE
Patient's blood pressure range in the last 24 hours was: BP  Min: 136/89  Max: 182/94.The patient's inpatient anti-hypertensive regimen is listed below:  Current Antihypertensives  lisinopriL tablet 5 mg, Daily, Oral  labetalol 20 mg/4 mL (5 mg/mL) IV syring, Every 6 hours PRN, Intravenous    Plan  - BP is uncontrolled, will adjust as follows: add home meds after able to tolerate po

## 2025-04-07 NOTE — ASSESSMENT & PLAN NOTE
- Continue Biktarvy  - f/u CD4 count ordered 04/04 at [facility] to rule out loose stools associated with opportunistic infection

## 2025-04-07 NOTE — SUBJECTIVE & OBJECTIVE
Past Medical History:   Diagnosis Date    Diabetes mellitus     HIV (human immunodeficiency virus infection) 1998    Hypertension        History reviewed. No pertinent surgical history.    Review of patient's allergies indicates:   Allergen Reactions    Duloxetine     Lithium analogues        No current facility-administered medications on file prior to encounter.     Current Outpatient Medications on File Prior to Encounter   Medication Sig    amitriptyline (ELAVIL) 25 MG tablet Take 25 mg by mouth every evening.    aspirin (ECOTRIN) 81 MG EC tablet Take 81 mg by mouth.    BIKTARVY -25 mg (25 kg or greater) Take 1 tablet by mouth.    blood sugar diagnostic Strp 1 strip by Other route.    divalproex ER (DEPAKOTE ER) 250 MG 24 hr tablet Take 250 mg by mouth 2 (two) times daily.    HIGH POTENCY MULTIVITAMIN 400 mcg Tab Take 1 tablet by mouth.    lisinopriL (PRINIVIL,ZESTRIL) 5 MG tablet Take 5 mg by mouth once daily.    magnesium oxide (MAG-OX) 400 mg (241.3 mg magnesium) tablet Take 1 tablet (400 mg total) by mouth once daily.    metFORMIN (GLUCOPHAGE) 500 MG tablet metformin 500 mg tablet   Take 1 tablet twice a day by oral route.    nicotine (NICODERM CQ) 21 mg/24 hr Place 1 patch onto the skin once daily.    ONETOUCH VERIO Strp     pantoprazole (PROTONIX) 40 MG tablet Please take 1 tablet by mouth every 12 hr when you have abdominal pain, then take 1 tablet every day.    TRADJENTA 5 mg Tab tablet Take 5 mg by mouth.     Family History    None       Tobacco Use    Smoking status: Every Day     Current packs/day: 1.00     Types: Cigarettes    Smokeless tobacco: Never   Substance and Sexual Activity    Alcohol use: Not Currently     Comment: OCC    Drug use: Not Currently    Sexual activity: Not Currently     Review of Systems   Constitutional:  Positive for appetite change. Negative for activity change and fever.   Respiratory:  Negative for cough, shortness of breath and wheezing.    Cardiovascular:  Negative  for chest pain and leg swelling.   Gastrointestinal:  Positive for diarrhea and nausea. Negative for abdominal pain, constipation and vomiting.   Skin:  Negative for rash.   Neurological:  Negative for headaches.        LE neuropathy     Objective:     Vital Signs (Most Recent):  Temp: 98 °F (36.7 °C) (04/06/25 2235)  Pulse: 75 (04/06/25 2354)  Resp: 20 (04/06/25 2354)  BP: (!) 156/81 (04/06/25 2344)  SpO2: 96 % (04/06/25 2354) Vital Signs (24h Range):  Temp:  [98 °F (36.7 °C)-98.4 °F (36.9 °C)] 98 °F (36.7 °C)  Pulse:  [70-77] 75  Resp:  [15-20] 20  SpO2:  [94 %-100 %] 96 %  BP: (136-182)/(70-95) 156/81     Weight: 80.7 kg (178 lb)  Body mass index is 24.83 kg/m².     Physical Exam  Vitals and nursing note reviewed.   HENT:      Head: Normocephalic and atraumatic.   Eyes:      Extraocular Movements: Extraocular movements intact.      Conjunctiva/sclera: Conjunctivae normal.   Cardiovascular:      Rate and Rhythm: Normal rate and regular rhythm.      Pulses: Normal pulses.   Pulmonary:      Effort: Pulmonary effort is normal. No respiratory distress.      Breath sounds: No wheezing or rales.   Abdominal:      General: Bowel sounds are increased. There is no distension.      Palpations: Abdomen is soft.      Tenderness: There is no abdominal tenderness. There is no guarding.   Musculoskeletal:         General: Normal range of motion.      Cervical back: Normal range of motion.      Right lower leg: No edema.      Left lower leg: No edema.   Skin:     General: Skin is warm and dry.   Neurological:      General: No focal deficit present.      Mental Status: He is alert and oriented to person, place, and time.   Psychiatric:         Mood and Affect: Mood normal.                Significant Labs: All pertinent labs within the past 24 hours have been reviewed.    Significant Imaging: I have reviewed all pertinent imaging results/findings within the past 24 hours.

## 2025-04-07 NOTE — HPI
Mr. Loya is a 59 year old M with of DM (A1C 5.8), HTN, current tobacco use, and HIV presented to the ED with complaints of acute on chronic abdominal pain and loose stools. His diffuse abdominal pain worsened over the past 2-3 days with intermittent nausea and vomiting and he has been unable to tolerate anything solid since he last had cabbage and rice 1 week ago. He is able to drink water and fluids. He has had loose stools also for at least a week. Nothing makes the pain/stools better or worse. Since then his only symptoms have been diarrhea and abdominal pain. Patient denies bloody stools, fever, or urinary symptoms. Patient currently resides at [facility] and denies any sick contacts. Last cocaine and marijuana use was one week ago prior to hospitalization at [facility]. No ETOH use. He reports adherence with Biktarvy. Last EGD 01/07/25 with [facility] [place] and demonstrated diffuse mildly erythematous mucosa without bleeding in the gastric antrum. Has not had a chance to fill Protonix recommended at his last hospitalization.     In the ED he was afebrile, hemodynamically stable on RA without significant lab abnormalities. Marijuana positive on UDS. CTA AP with no evidence of acute mesenteric arterial thrombosis or stenosis, continued mildly prominent loops of small bowel, similar to the prior study, findings may be due to partial small bowel obstruction versus enteritis. General Surgery evaluated and determined low concern for SBO with no need for NGT. Admitted to Hospital Medicine for N/V/D workup.

## 2025-04-07 NOTE — ASSESSMENT & PLAN NOTE
Resume home gabapentin, Dc'ed at last hospitalization it seems in error. Pt reports daily use 800mg TID with medical record review showing recent use.

## 2025-04-07 NOTE — CONSULTS
[facility] - Emergency Dept  General Surgery  Consult Note    Inpatient consult to General Surgery  Consult performed by: Adela Blackwell MD  Consult ordered by: Carmenza Russell MD        Subjective:     Chief Complaint/Reason for Admission: abdominal pain    History of Present Illness: A 59 year old male with a PMHX of DM, HTN, and HIV who presents to the ED with abdominal pain, nasuea, vomiting and diarrhea. He was discharged yesterday following hospitalization for the same sxs. Reports his nausea is usually worsenend by some of his home medications, and when he took them this mornign his pain, nausea and emesis worsened. He had a large bowel movement today. Has been passing gas. Denies any recent drug use or drinking.    In the ED he is HDS, labs wnl. Imaging obtained included CTA which did not note any vascular abnormalities. Some prominent loops of small bowel with no transition point and no stomach dilation. Last BM today. He has a decreased appetite though no pain with eating. Says he has been having problems like this for about 4 years.     No surgical history.     No current facility-administered medications on file prior to encounter.     Current Outpatient Medications on File Prior to Encounter   Medication Sig    amitriptyline (ELAVIL) 25 MG tablet Take 25 mg by mouth every evening.    aspirin (ECOTRIN) 81 MG EC tablet Take 81 mg by mouth.    BIKTARVY -25 mg (25 kg or greater) Take 1 tablet by mouth.    blood sugar diagnostic Strp 1 strip by Other route.    divalproex ER (DEPAKOTE ER) 250 MG 24 hr tablet Take 250 mg by mouth 2 (two) times daily.    HIGH POTENCY MULTIVITAMIN 400 mcg Tab Take 1 tablet by mouth.    lisinopriL (PRINIVIL,ZESTRIL) 5 MG tablet Take 5 mg by mouth once daily.    magnesium oxide (MAG-OX) 400 mg (241.3 mg magnesium) tablet Take 1 tablet (400 mg total) by mouth once daily.    metFORMIN (GLUCOPHAGE) 500 MG tablet metformin 500 mg tablet   Take 1 tablet twice a day by oral  route.    nicotine (NICODERM CQ) 21 mg/24 hr Place 1 patch onto the skin once daily.    ONETOUCH VERIO Strp     pantoprazole (PROTONIX) 40 MG tablet Please take 1 tablet by mouth every 12 hr when you have abdominal pain, then take 1 tablet every day.    TRADJENTA 5 mg Tab tablet Take 5 mg by mouth.       Review of patient's allergies indicates:   Allergen Reactions    Duloxetine     Lithium analogues        Past Medical History:   Diagnosis Date    Diabetes mellitus     HIV (human immunodeficiency virus infection) 1998    Hypertension      History reviewed. No pertinent surgical history.  Family History    None       Tobacco Use    Smoking status: Every Day     Current packs/day: 1.00     Types: Cigarettes    Smokeless tobacco: Never   Substance and Sexual Activity    Alcohol use: Not Currently     Comment: OCC    Drug use: Not Currently    Sexual activity: Not Currently     Review of Systems   Constitutional:  Positive for appetite change.   HENT: Negative.     Respiratory: Negative.     Cardiovascular: Negative.    Gastrointestinal:  Positive for abdominal distention, abdominal pain, diarrhea, nausea and vomiting. Negative for constipation.     Objective:     Vital Signs (Most Recent):  Temp: 98.4 °F (36.9 °C) (04/06/25 1844)  Pulse: 72 (04/06/25 2032)  Resp: 15 (04/06/25 2032)  BP: (!) 157/95 (04/06/25 2032)  SpO2: 98 % (04/06/25 2032) Vital Signs (24h Range):  Temp:  [98.3 °F (36.8 °C)-98.4 °F (36.9 °C)] 98.4 °F (36.9 °C)  Pulse:  [70-77] 72  Resp:  [15-20] 15  SpO2:  [97 %-100 %] 98 %  BP: (157-182)/(93-95) 157/95     Weight: 80.7 kg (178 lb)  Body mass index is 24.83 kg/m².      Intake/Output Summary (Last 24 hours) at 4/6/2025 2218  Last data filed at 4/6/2025 2039  Gross per 24 hour   Intake 992.55 ml   Output --   Net 992.55 ml       Physical Exam  Constitutional:       General: He is not in acute distress.     Appearance: Normal appearance.   HENT:      Head: Normocephalic and atraumatic.       Mouth/Throat:      Mouth: Mucous membranes are moist.   Eyes:      Pupils: Pupils are equal, round, and reactive to light.   Cardiovascular:      Rate and Rhythm: Normal rate.   Pulmonary:      Effort: Pulmonary effort is normal. No respiratory distress.   Abdominal:      General: Abdomen is flat. There is no distension.      Palpations: Abdomen is soft.      Tenderness: There is no abdominal tenderness. There is no guarding.      Comments: Abdomen soft, non-distended and non tender even to deep palpation   Musculoskeletal:         General: Normal range of motion.      Cervical back: Normal range of motion.   Skin:     General: Skin is warm and dry.   Neurological:      General: No focal deficit present.      Mental Status: He is alert and oriented to person, place, and time.   Psychiatric:         Mood and Affect: Mood normal.         Behavior: Behavior normal.         Significant Labs:  All pertinent labs from the last 24 hours have been reviewed.    Significant Diagnostics:  I have reviewed all pertinent imaging results/findings within the past 24 hours.    Assessment/Plan:     Patient obye  59y M who presents w/ abdominal pain, nausea, vomiting and diarrhea w/ recent hx of hospitalization for the same thing. Clinical picture does not appear to be consistent with bowel obstruction. Imaging not impressive, abdominal exam soft and non-tender. Having regular bowel function. Says he has had issues like this for 4-5 years and attributes some of them to his home meds.    Recommend w/u for other cause of N/V/D  No surgical issue at this time   We will sign off     Thank you for your consult. I will sign off. Please contact us if you have any additional questions.    Adela Blackwell MD  General Surgery  [facility] - Emergency Dept

## 2025-04-07 NOTE — PLAN OF CARE
[facility] - Med Surg  Initial Discharge Assessment       Primary Care Provider: [facility]    Admission Diagnosis: Nausea [R11.0]  Human immunodeficiency virus (HIV) disease [B20]  Partial small bowel obstruction [K56.600]  Pain of upper abdomen [R10.10]  Diarrhea, unspecified type [R19.7]  Vomiting, unspecified vomiting type, unspecified whether nausea present [R11.10]    Admission Date: 4/6/2025  Expected Discharge Date:     Transition of Care Barriers: (P) None    Payor: [employer] [facility] / Plan: [employer] / Product Type: Medicare Advantage /     Extended Emergency Contact Information  Primary Emergency Contact: Kiat Vides  Mobile Phone: [phone]  Relation: Friend  Preferred language: English   needed? No    Discharge Plan A: (P) Shelter  Discharge Plan B: (P) Group home    No Pharmacies Listed          Sw met with pt at bedside to discuss dc plan. Pt reported that he is homeless and resides at [facility]. Pt stated that it made one week on 4/4/25. Pt is independent and ambulatory with ADL's. Pt does not use any DME. Pt reported that his 's name is Alexa Popean [phone].     Discharge Plan A and Plan B have been determined by review of patient's clinical status, future medical and therapeutic needs, and coverage/benefits for post-acute care in coordination with multidisciplinary team members.        Initial Assessment (most recent)       Adult Discharge Assessment - 04/07/25 1617          Discharge Assessment    Assessment Type Discharge Planning Assessment (P)      Confirmed/corrected address, phone number and insurance No (P)      Source of Information patient (P)      Does patient/caregiver understand observation status Yes (P)      Communicated ARMANDO with patient/caregiver Date not available/Unable to determine (P)      Reason For Admission Partial small bowel obstruction (P)      Facility Arrived From:  [facility] (P)      Do you expect to return to your current living situation? Yes (P)      Do you have help at home or someone to help you manage your care at home? No (P)      Who are your caregiver(s) and their phone number(s)? Kita Vides [phone] (Friend) (P)      Prior to hospitilization cognitive status: Alert/Oriented (P)      Current cognitive status: Alert/Oriented (P)      Walking or Climbing Stairs Difficulty no (P)      Dressing/Bathing Difficulty no (P)      Equipment Currently Used at Home none (P)      Readmission within 30 days? Yes (P)      Patient currently being followed by outpatient case management? Yes (P)      If yes, name of outpatient case management following: other (comments) (P)    Hurt Care    Do you currently have service(s) that help you manage your care at home? No (P)      Do you take prescription medications? Yes (P)      Do you have prescription coverage? Yes (P)      Coverage [facility] SNP (P)      Are you on dialysis? No (P)      Do you take coumadin? No (P)      Discharge Plan A Shelter (P)      Discharge Plan B Group home (P)      DME Needed Upon Discharge  none (P)      Discharge Plan discussed with: Patient (P)      Transition of Care Barriers None (P)         Physical Activity    On average, how many days per week do you engage in moderate to strenuous exercise (like a brisk walk)? 0 days (P)      On average, how many minutes do you engage in exercise at this level? 0 min (P)         Financial Resource Strain    How hard is it for you to pay for the very basics like food, housing, medical care, and heating? Very hard (P)         Housing Stability    In the last 12 months, was there a time when you were not able to pay the mortgage or rent on time? Yes (P)      At any time in the past 12 months, were you homeless or living in a shelter (including now)? Yes (P)         Transportation Needs    In the past 12 months, has  lack of transportation kept you from medical appointments or from getting medications? Yes (P)      In the past 12 months, has lack of transportation kept you from meetings, work, or from getting things needed for daily living? Yes (P)         Food Insecurity    Within the past 12 months, you worried that your food would run out before you got the money to buy more. Often true (P)      Within the past 12 months, the food you bought just didn't last and you didn't have money to get more. Often true (P)         Stress    Do you feel stress - tense, restless, nervous, or anxious, or unable to sleep at night because your mind is troubled all the time - these days? Very much (P)         Social Isolation    How often do you feel lonely or isolated from those around you?  Always (P)         Alcohol Use    Q1: How often do you have a drink containing alcohol? Never (P)      Q2: How many drinks containing alcohol do you have on a typical day when you are drinking? Patient does not drink (P)      Q3: How often do you have six or more drinks on one occasion? Never (P)         [employer]    In the past 12 months has the electric, gas, oil, or water company threatened to shut off services in your home? Yes (P)         Health Literacy    How often do you need to have someone help you when you read instructions, pamphlets, or other written material from your doctor or pharmacy? Never (P)                      Readmission Assessment (most recent)       Readmission Assessment - 04/07/25 1615          Readmission    Was this a planned readmission? No     Why were you hospitalized in the last 30 days? Vomiting     Why were you readmitted? Related to previous admission     When you left the hospital where did you go? Homeless shelter     Did patient/caregiver refused recommended DC plan? No     Tell me about what happened between when you left the hospital and the day you returned. Vomiting     When did you start not feeling well?  Returned to the hospital Sunday     Did you try to manage your symptoms your self? No     Did you call anyone? No     Why? Came to ED     Did you try to see or did see a doctor or nurse before you came? No     Why? Came to ED                      RACHEAL Guerra  Case Management  [phone]

## 2025-04-08 VITALS
WEIGHT: 178 LBS | OXYGEN SATURATION: 98 % | TEMPERATURE: 98 F | HEART RATE: 58 BPM | BODY MASS INDEX: 24.92 KG/M2 | RESPIRATION RATE: 18 BRPM | DIASTOLIC BLOOD PRESSURE: 76 MMHG | HEIGHT: 71 IN | SYSTOLIC BLOOD PRESSURE: 124 MMHG

## 2025-04-08 LAB
ALBUMIN SERPL BCP-MCNC: 2.9 G/DL (ref 3.5–5.2)
ALP SERPL-CCNC: 61 UNIT/L (ref 40–150)
ALT SERPL W/O P-5'-P-CCNC: 34 UNIT/L (ref 10–44)
ANION GAP (OHS): 7 MMOL/L (ref 8–16)
AST SERPL-CCNC: 23 UNIT/L (ref 11–45)
BILIRUB SERPL-MCNC: 0.3 MG/DL (ref 0.1–1)
BUN SERPL-MCNC: 13 MG/DL (ref 6–20)
CALCIUM SERPL-MCNC: 8.8 MG/DL (ref 8.7–10.5)
CHLORIDE SERPL-SCNC: 105 MMOL/L (ref 95–110)
CO2 SERPL-SCNC: 26 MMOL/L (ref 23–29)
CREAT SERPL-MCNC: 1 MG/DL (ref 0.5–1.4)
E COLI SXT1 STL QL IA: NEGATIVE
E COLI SXT2 STL QL IA: NEGATIVE
GFR SERPLBLD CREATININE-BSD FMLA CKD-EPI: >60 ML/MIN/1.73/M2
GLUCOSE SERPL-MCNC: 109 MG/DL (ref 70–110)
POTASSIUM SERPL-SCNC: 4.1 MMOL/L (ref 3.5–5.1)
PROT SERPL-MCNC: 6.4 GM/DL (ref 6–8.4)
SODIUM SERPL-SCNC: 138 MMOL/L (ref 136–145)

## 2025-04-08 PROCEDURE — 63600175 PHARM REV CODE 636 W HCPCS

## 2025-04-08 PROCEDURE — 25000003 PHARM REV CODE 250: Performed by: INTERNAL MEDICINE

## 2025-04-08 PROCEDURE — 36415 COLL VENOUS BLD VENIPUNCTURE: CPT | Performed by: INTERNAL MEDICINE

## 2025-04-08 PROCEDURE — 27000207 HC ISOLATION

## 2025-04-08 PROCEDURE — 84155 ASSAY OF PROTEIN SERUM: CPT | Performed by: INTERNAL MEDICINE

## 2025-04-08 PROCEDURE — 21400001 HC TELEMETRY ROOM

## 2025-04-08 PROCEDURE — 96376 TX/PRO/DX INJ SAME DRUG ADON: CPT

## 2025-04-08 PROCEDURE — 25000003 PHARM REV CODE 250

## 2025-04-08 RX ORDER — TALC
6 POWDER (GRAM) TOPICAL NIGHTLY PRN
Status: DISCONTINUED | OUTPATIENT
Start: 2025-04-08 | End: 2025-04-08 | Stop reason: HOSPADM

## 2025-04-08 RX ORDER — GABAPENTIN 400 MG/1
800 CAPSULE ORAL ONCE
Status: COMPLETED | OUTPATIENT
Start: 2025-04-08 | End: 2025-04-08

## 2025-04-08 RX ORDER — GABAPENTIN 100 MG/1
100 CAPSULE ORAL ONCE
Status: DISCONTINUED | OUTPATIENT
Start: 2025-04-08 | End: 2025-04-08

## 2025-04-08 RX ORDER — PANTOPRAZOLE SODIUM 40 MG/1
40 TABLET, DELAYED RELEASE ORAL DAILY
Status: DISCONTINUED | OUTPATIENT
Start: 2025-04-09 | End: 2025-04-08 | Stop reason: HOSPADM

## 2025-04-08 RX ADMIN — Medication 6 MG: at 03:04

## 2025-04-08 RX ADMIN — PANTOPRAZOLE SODIUM 40 MG: 40 INJECTION, POWDER, FOR SOLUTION INTRAVENOUS at 08:04

## 2025-04-08 RX ADMIN — ASPIRIN 81 MG: 81 TABLET, COATED ORAL at 08:04

## 2025-04-08 RX ADMIN — BICTEGRAVIR SODIUM, EMTRICITABINE, AND TENOFOVIR ALAFENAMIDE FUMARATE 1 TABLET: 50; 200; 25 TABLET ORAL at 08:04

## 2025-04-08 RX ADMIN — GABAPENTIN 800 MG: 400 CAPSULE ORAL at 03:04

## 2025-04-08 RX ADMIN — LISINOPRIL 5 MG: 5 TABLET ORAL at 08:04

## 2025-04-08 RX ADMIN — DIVALPROEX SODIUM 250 MG: 250 TABLET, EXTENDED RELEASE ORAL at 08:04

## 2025-04-08 NOTE — DISCHARGE SUMMARY
[facility] Medicine  Discharge Summary      Patient Name: Ang Loya  MRN: [id number]  Admission Date: 4/6/2025  Hospital Length of Stay: 0 days  Discharge Date and Time: 04/08/2025 3:31 PM  Attending Physician: Shay Palmer MD   Discharging Provider: Shay Palmer MD  Discharge Provider Team: [facility] HOSP MED W  Primary Care Provider: [facility]        HPI: Mr. Loya is a 59 year old M with of DM (A1C 5.8), HTN, current tobacco use, and HIV presented to the ED with complaints of acute on chronic abdominal pain and loose stools. His diffuse abdominal pain worsened over the past 2-3 days with intermittent nausea and vomiting and he has been unable to tolerate anything solid since he last had cabbage and rice 1 week ago. He is able to drink water and fluids. He has had loose stools also for at least a week. Nothing makes the pain/stools better or worse. Since then his only symptoms have been diarrhea and abdominal pain. Patient denies bloody stools, fever, or urinary symptoms. Patient currently resides at [facility] and denies any sick contacts. Last cocaine and marijuana use was one week ago prior to hospitalization at [facility]. No ETOH use. He reports adherence with Biktarvy. Last EGD 01/07/25 with [facility] and demonstrated diffuse mildly erythematous mucosa without bleeding in the gastric antrum. Has not had a chance to fill Protonix recommended at his last hospitalization.      In the ED he was afebrile, hemodynamically stable on RA without significant lab abnormalities. Marijuana positive on UDS. CTA AP with no evidence of acute mesenteric arterial thrombosis or stenosis, continued mildly prominent loops of small bowel, similar to the prior study, findings may be due to partial small bowel obstruction versus enteritis. General Surgery evaluated and determined low concern for SBO with no need for NGT. Admitted to Hospital Medicine for N/V/D workup    * No surgery  found *      Hospital Course: Pt was treated with supportive cares and had improvement in symptoms. Work up negative including C.diff and E.coli testing. Stool culture pending at time of discharge.     Consults:   Consults (From admission, onward)          Status Ordering Provider     Inpatient consult to General Surgery  Once        Provider:  (Not yet assigned)    Completed NICK GRANADO            Final Active Diagnoses:    Diagnosis Date Noted POA    PRINCIPAL PROBLEM:  Partial small bowel obstruction [K56.600] 04/03/2025 Yes    Nausea & vomiting [R11.2] 04/06/2025 Unknown    Hypertension [I10] 04/04/2025 Yes    Substance induced mood disorder [F19.94] 12/03/2024 Yes    Type 2 diabetes mellitus [E11.9] 03/10/2014 Yes    Tobacco use [Z72.0] 03/10/2014 Yes    Peripheral neuropathy [G62.9] 03/10/2014 Yes     Chronic    Human immunodeficiency virus (HIV) disease [B20] 07/28/2008 Yes      Problems Resolved During this Admission:      Discharged Condition: stable    Disposition: Home or Self Care    Follow Up:    Patient Instructions:      Diet Cardiac     Activity as tolerated     Medications:  Reconciled Home Medications:      Medication List        CONTINUE taking these medications      amitriptyline 25 MG tablet  Commonly known as: ELAVIL  Take 25 mg by mouth every evening.     aspirin 81 MG EC tablet  Commonly known as: ECOTRIN  Take 81 mg by mouth.     BIKTARVY -25 mg (25 kg or greater)  Generic drug: oyueriaui-vzthxeih-yvqppol ala  Take 1 tablet by mouth.     * blood sugar diagnostic Strp  1 strip by Other route.     * ONETOUCH VERIO TEST STRIPS Strp  Generic drug: blood sugar diagnostic     divalproex  MG 24 hr tablet  Commonly known as: DEPAKOTE ER  Take 250 mg by mouth 2 (two) times daily.     HIGH POTENCY MULTIVITAMIN 400 mcg Tab  Generic drug: multivitamin with folic acid  Take 1 tablet by mouth.     lisinopriL 5 MG tablet  Commonly known as: PRINIVIL,ZESTRIL  Take 5 mg by mouth once daily.      magnesium oxide 400 mg (241.3 mg magnesium) tablet  Commonly known as: MAG-OX  Take 1 tablet (400 mg total) by mouth once daily.     metFORMIN 500 MG tablet  Commonly known as: GLUCOPHAGE  metformin 500 mg tablet   Take 1 tablet twice a day by oral route.     nicotine 21 mg/24 hr  Commonly known as: NICODERM CQ  Place 1 patch onto the skin once daily.     pantoprazole 40 MG tablet  Commonly known as: PROTONIX  Please take 1 tablet by mouth every 12 hr when you have abdominal pain, then take 1 tablet every day.     TRADJENTA 5 mg Tab tablet  Generic drug: linaGLIPtin  Take 5 mg by mouth.           * This list has 2 medication(s) that are the same as other medications prescribed for you. Read the directions carefully, and ask your doctor or other care provider to review them with you.                  Significant Diagnostic Studies: Stool culture pending at time of discharge    Pending Diagnostic Studies:       None          Indwelling Lines/Drains at time of discharge:   Lines/Drains/Airways       None                   Time spent on the discharge of patient: 45 minutes         Shay Palmer MD  Department of Hospital Medicine  [facility] Surg

## 2025-04-08 NOTE — PLAN OF CARE
Inpatient Upgrade Note    Ang Loya has warranted treatment spanning two or more midnights of hospital level care for the management of  Partial small bowel obstruction, nausea and vomiting . He continued to require daily labs, monitoring of vital signs, advancing diet, and IV pantoprazole . His condition is also complicated by the following comorbidities:  DM, HTN, current tobacco use, and HIV .

## 2025-04-08 NOTE — PLAN OF CARE
Rajeev received MULUGETA for pt to sign on 4/7/25. Pt signed MULUGETA 4/8/25 and it was emailed to pt's  at [facility] [place].    Discharge Plan A and Plan B have been determined by review of patient's clinical status, future medical and therapeutic needs, and coverage/benefits for post-acute care in coordination with multidisciplinary team members.    RAJEEV Guerra  Case Management  [phone]

## 2025-04-08 NOTE — DISCHARGE INSTRUCTIONS
Our goal at [facility] is to always give you outstanding care and exceptional service. You may receive a survey from [employer] by mail, text or e-mail in the next 24-48 hours asking about the care you received with us. The survey should only take 5-10 minutes to complete and is very important to us.     Your feedback provides us with a way to recognize our staff who work tirelessly to provide the best care! Also, your responses help us learn how to improve when your experience was below our aspiration of excellence. We are always looking for ways to improve your stay. We WILL use your feedback to continue making improvements to help us provide the highest quality care. We keep your personal information and feedback confidential. We appreciate your time completing this survey and can't wait to hear from you!!!    We look forward to your continued care with us! Thanks so much for choosing [facility] for your healthcare needs!

## 2025-04-08 NOTE — PLAN OF CARE
Patient discharged. Discharge instructions explained, verbalized understanding. IV removed, catheter tip intact. Patient waiting on medications and transportation. Will continue to monitor patient.   Problem: Adult Inpatient Plan of Care  Goal: Plan of Care Review  Outcome: Met  Goal: Patient-Specific Goal (Individualized)  Outcome: Met  Goal: Absence of Hospital-Acquired Illness or Injury  Outcome: Met  Goal: Optimal Comfort and Wellbeing  Outcome: Met  Goal: Readiness for Transition of Care  Outcome: Met     Problem: Diabetes Comorbidity  Goal: Blood Glucose Level Within Targeted Range  Outcome: Met     Problem: Fall Injury Risk  Goal: Absence of Fall and Fall-Related Injury  Outcome: Met

## 2025-04-09 LAB — BACTERIA STL CULT: NORMAL

## 2025-04-09 NOTE — PLAN OF CARE
[facility] - Med Surg  Discharge Final Note    Primary Care Provider: [facility]    Expected Discharge Date: 4/8/2025        Patient dc to [facility]. There were no services needed from case management.    Discharge Plan A and Plan B have been determined by review of patient's clinical status, future medical and therapeutic needs, and coverage/benefits for post-acute care in coordination with multidisciplinary team members.    Final Discharge Note (most recent)       Final Note - 04/09/25 0911          Final Note    Assessment Type Final Discharge Note (P)      Anticipated Discharge Disposition Home or Self Care (P)      What phone number can be called within the next 1-3 days to see how you are doing after discharge? [phone] (P)         Post-Acute Status    Post-Acute Authorization Other (P)      [facility] SECURE SNP (P)      Other Status No Post-Acute Service Needs (P)      Discharge Delays None known at this time (P)                      Important Message from Medicare         RACHEAL Guerra  Case Management  [phone]

## 2025-04-15 NOTE — PHYSICIAN QUERY
Please clarify the patient's HIV diagnosis:  Other (please specify): Asymptomatic HIV infection